# Patient Record
Sex: MALE | Race: WHITE | NOT HISPANIC OR LATINO | Employment: OTHER | URBAN - NONMETROPOLITAN AREA
[De-identification: names, ages, dates, MRNs, and addresses within clinical notes are randomized per-mention and may not be internally consistent; named-entity substitution may affect disease eponyms.]

---

## 2017-04-18 ENCOUNTER — TRANSCRIBE ORDERS (OUTPATIENT)
Dept: ADMINISTRATIVE | Facility: HOSPITAL | Age: 57
End: 2017-04-18

## 2017-04-18 DIAGNOSIS — R91.8 MULTIPLE LUNG NODULES: Primary | ICD-10-CM

## 2017-04-26 ENCOUNTER — HOSPITAL ENCOUNTER (OUTPATIENT)
Dept: CT IMAGING | Facility: HOSPITAL | Age: 57
Discharge: HOME | End: 2017-04-26
Attending: INTERNAL MEDICINE | Admitting: INTERNAL MEDICINE

## 2017-04-26 DIAGNOSIS — R91.8 MULTIPLE LUNG NODULES: ICD-10-CM

## 2017-04-26 PROCEDURE — 71250 CT THORAX DX C-: CPT

## 2017-11-16 ENCOUNTER — TRANSCRIBE ORDERS (OUTPATIENT)
Dept: ADMINISTRATIVE | Facility: HOSPITAL | Age: 57
End: 2017-11-16

## 2017-11-16 DIAGNOSIS — J44.9 CHRONIC OBSTRUCTIVE PULMONARY DISEASE, UNSPECIFIED COPD TYPE (HCC): Primary | ICD-10-CM

## 2017-11-17 ENCOUNTER — HOSPITAL ENCOUNTER (OUTPATIENT)
Dept: CT IMAGING | Facility: HOSPITAL | Age: 57
Discharge: HOME | End: 2017-11-17
Admitting: PHYSICIAN ASSISTANT

## 2017-11-17 DIAGNOSIS — J44.9 CHRONIC OBSTRUCTIVE PULMONARY DISEASE, UNSPECIFIED COPD TYPE (HCC): ICD-10-CM

## 2017-11-17 LAB — CREAT BLDA-MCNC: 0.9 MG/DL (ref 0.6–1.3)

## 2017-11-17 PROCEDURE — 71260 CT THORAX DX C+: CPT

## 2017-11-17 PROCEDURE — 0 IOPAMIDOL 61 % SOLUTION: Performed by: INTERNAL MEDICINE

## 2017-11-17 PROCEDURE — 82565 ASSAY OF CREATININE: CPT

## 2017-11-17 RX ADMIN — IOPAMIDOL 100 ML: 612 INJECTION, SOLUTION INTRAVENOUS at 08:45

## 2018-06-27 ENCOUNTER — TRANSCRIBE ORDERS (OUTPATIENT)
Dept: ADMINISTRATIVE | Facility: HOSPITAL | Age: 58
End: 2018-06-27

## 2018-06-27 DIAGNOSIS — I48.91 ATRIAL FIBRILLATION, UNSPECIFIED TYPE (HCC): Primary | ICD-10-CM

## 2018-06-27 DIAGNOSIS — R09.89 OTHER SPECIFIED SYMPTOMS AND SIGNS INVOLVING THE CIRCULATORY AND RESPIRATORY SYSTEMS: ICD-10-CM

## 2018-07-03 ENCOUNTER — HOSPITAL ENCOUNTER (OUTPATIENT)
Dept: CT IMAGING | Facility: HOSPITAL | Age: 58
Discharge: HOME | End: 2018-07-03
Admitting: PHYSICIAN ASSISTANT

## 2018-07-03 ENCOUNTER — HOSPITAL ENCOUNTER (OUTPATIENT)
Dept: CARDIOLOGY | Facility: HOSPITAL | Age: 58
Discharge: HOME | End: 2018-07-03

## 2018-07-03 DIAGNOSIS — I48.91 ATRIAL FIBRILLATION, UNSPECIFIED TYPE (HCC): ICD-10-CM

## 2018-07-03 DIAGNOSIS — R09.89 OTHER SPECIFIED SYMPTOMS AND SIGNS INVOLVING THE CIRCULATORY AND RESPIRATORY SYSTEMS: ICD-10-CM

## 2018-07-03 LAB — CREAT BLDA-MCNC: 0.9 MG/DL (ref 0.6–1.3)

## 2018-07-03 PROCEDURE — 93226 XTRNL ECG REC<48 HR SCAN A/R: CPT

## 2018-07-03 PROCEDURE — 93225 XTRNL ECG REC<48 HRS REC: CPT

## 2018-07-03 PROCEDURE — 25010000002 IOPAMIDOL 61 % SOLUTION: Performed by: PHYSICIAN ASSISTANT

## 2018-07-03 PROCEDURE — 71260 CT THORAX DX C+: CPT

## 2018-07-03 PROCEDURE — 82565 ASSAY OF CREATININE: CPT

## 2018-07-03 RX ADMIN — IOPAMIDOL 100 ML: 612 INJECTION, SOLUTION INTRAVENOUS at 13:00

## 2018-07-10 PROCEDURE — 93227 XTRNL ECG REC<48 HR R&I: CPT | Performed by: INTERNAL MEDICINE

## 2018-08-23 ENCOUNTER — TRANSCRIBE ORDERS (OUTPATIENT)
Dept: ADMINISTRATIVE | Facility: HOSPITAL | Age: 58
End: 2018-08-23

## 2018-08-23 DIAGNOSIS — R91.8 MULTIPLE PULMONARY NODULES: Primary | ICD-10-CM

## 2018-12-14 RX ORDER — ALBUTEROL SULFATE 2.5 MG/3ML
SOLUTION RESPIRATORY (INHALATION)
Qty: 360 ML | Refills: 3 | Status: SHIPPED | OUTPATIENT
Start: 2018-12-14 | End: 2019-07-12 | Stop reason: SDUPTHER

## 2019-02-18 ENCOUNTER — TRANSCRIBE ORDERS (OUTPATIENT)
Dept: ADMINISTRATIVE | Facility: HOSPITAL | Age: 59
End: 2019-02-18

## 2019-02-18 DIAGNOSIS — J44.9 CHRONIC OBSTRUCTIVE PULMONARY DISEASE, UNSPECIFIED COPD TYPE (HCC): ICD-10-CM

## 2019-02-18 DIAGNOSIS — R91.1 SOLITARY PULMONARY NODULE: ICD-10-CM

## 2019-02-18 DIAGNOSIS — J43.9 PULMONARY EMPHYSEMA, UNSPECIFIED EMPHYSEMA TYPE (HCC): Primary | ICD-10-CM

## 2019-02-21 ENCOUNTER — HOSPITAL ENCOUNTER (OUTPATIENT)
Dept: PULMONOLOGY | Facility: HOSPITAL | Age: 59
Discharge: HOME | End: 2019-02-21
Admitting: PHYSICIAN ASSISTANT

## 2019-02-21 DIAGNOSIS — J44.9 CHRONIC OBSTRUCTIVE PULMONARY DISEASE, UNSPECIFIED COPD TYPE (HCC): ICD-10-CM

## 2019-02-21 DIAGNOSIS — R91.1 SOLITARY PULMONARY NODULE: ICD-10-CM

## 2019-02-21 DIAGNOSIS — J43.9 PULMONARY EMPHYSEMA, UNSPECIFIED EMPHYSEMA TYPE (HCC): ICD-10-CM

## 2019-02-21 LAB
ARTERIAL PATENCY WRIST A: POSITIVE
ATMOSPHERIC PRESS: 756 MMHG
BASE EXCESS BLDA CALC-SCNC: 1.8 MMOL/L (ref 0–2)
BDY SITE: NORMAL
BODY TEMPERATURE: 37 C
HCO3 BLDA-SCNC: 25.7 MMOL/L (ref 20–26)
Lab: NORMAL
MODALITY: NORMAL
PCO2 BLDA: 37.2 MM HG (ref 35–45)
PH BLDA: 7.45 PH UNITS (ref 7.35–7.45)
PO2 BLDA: 84.4 MM HG (ref 83–108)
SAO2 % BLDCOA: 97.4 % (ref 94–99)
VENTILATOR MODE: NORMAL

## 2019-02-21 PROCEDURE — 36600 WITHDRAWAL OF ARTERIAL BLOOD: CPT

## 2019-02-21 PROCEDURE — 94060 EVALUATION OF WHEEZING: CPT

## 2019-02-21 PROCEDURE — 94726 PLETHYSMOGRAPHY LUNG VOLUMES: CPT | Performed by: INTERNAL MEDICINE

## 2019-02-21 PROCEDURE — 94726 PLETHYSMOGRAPHY LUNG VOLUMES: CPT

## 2019-02-21 PROCEDURE — 94729 DIFFUSING CAPACITY: CPT | Performed by: INTERNAL MEDICINE

## 2019-02-21 PROCEDURE — 82803 BLOOD GASES ANY COMBINATION: CPT

## 2019-02-21 PROCEDURE — 94729 DIFFUSING CAPACITY: CPT

## 2019-02-21 PROCEDURE — 94060 EVALUATION OF WHEEZING: CPT | Performed by: INTERNAL MEDICINE

## 2019-02-21 RX ORDER — ALBUTEROL SULFATE 2.5 MG/3ML
2.5 SOLUTION RESPIRATORY (INHALATION) ONCE
Status: DISCONTINUED | OUTPATIENT
Start: 2019-02-21 | End: 2019-02-22 | Stop reason: HOSPADM

## 2019-05-10 RX ORDER — ESCITALOPRAM OXALATE 10 MG/1
10 TABLET ORAL DAILY
COMMUNITY
End: 2020-02-18 | Stop reason: ALTCHOICE

## 2019-05-10 RX ORDER — TAMSULOSIN HYDROCHLORIDE 0.4 MG/1
1 CAPSULE ORAL NIGHTLY
COMMUNITY
End: 2021-06-28 | Stop reason: ALTCHOICE

## 2019-05-10 RX ORDER — BUDESONIDE AND FORMOTEROL FUMARATE DIHYDRATE 160; 4.5 UG/1; UG/1
2 AEROSOL RESPIRATORY (INHALATION)
COMMUNITY
End: 2020-02-18 | Stop reason: SDUPTHER

## 2019-05-10 RX ORDER — VARENICLINE TARTRATE 1 MG/1
1 TABLET, FILM COATED ORAL 2 TIMES DAILY
COMMUNITY
End: 2020-02-18 | Stop reason: ALTCHOICE

## 2019-05-10 RX ORDER — SILDENAFIL 100 MG/1
100 TABLET, FILM COATED ORAL DAILY PRN
COMMUNITY

## 2019-05-13 ENCOUNTER — OFFICE VISIT (OUTPATIENT)
Dept: PULMONOLOGY | Facility: CLINIC | Age: 59
End: 2019-05-13

## 2019-05-13 VITALS
HEIGHT: 70 IN | OXYGEN SATURATION: 95 % | BODY MASS INDEX: 25.77 KG/M2 | HEART RATE: 86 BPM | SYSTOLIC BLOOD PRESSURE: 122 MMHG | DIASTOLIC BLOOD PRESSURE: 80 MMHG | WEIGHT: 180 LBS

## 2019-05-13 DIAGNOSIS — J44.9 COPD, SEVERE (HCC): ICD-10-CM

## 2019-05-13 DIAGNOSIS — F17.210 CIGARETTE NICOTINE DEPENDENCE WITHOUT COMPLICATION: ICD-10-CM

## 2019-05-13 DIAGNOSIS — J98.4 SCARRING OF LUNG: ICD-10-CM

## 2019-05-13 DIAGNOSIS — R06.02 SHORTNESS OF BREATH: Primary | ICD-10-CM

## 2019-05-13 DIAGNOSIS — G47.34 NOCTURNAL HYPOXEMIA: ICD-10-CM

## 2019-05-13 LAB — SAO2 % BLD: NORMAL %

## 2019-05-13 PROCEDURE — 99214 OFFICE O/P EST MOD 30 MIN: CPT | Performed by: INTERNAL MEDICINE

## 2019-05-13 RX ORDER — ALBUTEROL SULFATE 90 UG/1
2 AEROSOL, METERED RESPIRATORY (INHALATION) EVERY 4 HOURS PRN
Qty: 1 INHALER | Refills: 11 | Status: SHIPPED | OUTPATIENT
Start: 2019-05-13 | End: 2021-06-28 | Stop reason: SDUPTHER

## 2019-05-13 NOTE — PATIENT INSTRUCTIONS
The patient states he is still smoking an occasional cigarette.  I did advise him to work on total smoking cessation.  His most recent [place] functions did show that his COPD was Gold stage III postbronchodilator.  I am going to get an overnight pulse ox study on room air to see if he still qualifies for nocturnal oxygen therapy.  We will see him back in about 3 months with a screening chest CT just prior to return at [facility].

## 2019-05-13 NOTE — PROGRESS NOTES
Subjective   Bin Oh is a 58 y.o. male.     Chief Complaint   Patient presents with   • Shortness of Breath      No My Sticky Note on file.    History of Present Illness   The patient returns today for follow-up.  He states he is still smoking occasional cigarette but not on a regular basis.  I counseled him as the importance of trying to totally discontinue smoking.  He did have recent pulmonary functions done at [facility] and although he had a very severe obstructive ventilatory defect prebronchodilator he had some improvement postbronchodilator and had a severe obstructive ventilatory defect present.  I encouraged him as the importance of totally quitting smoking.    Medical/Family/Social History   has a past medical history of Arthritis and COPD (chronic obstructive pulmonary disease) (CMS/[facility]).   has no past surgical history on file.  family history includes Heart disease in his father and mother.  He unfortunately still smoking occasionally.  He does not use smokeless tobacco and does not use alcohol or take drugs.  Allergies   Allergen Reactions   • Penicillins Other (See Comments)     Unknown       Medications    Current Outpatient Medications:   •  albuterol (PROVENTIL) (2.5 MG/3ML) 0.083% nebulizer solution, INHALE 1 VIAL EVERY 6 HOURS AS NEEDED, Disp: 360 mL, Rfl: 3  •  budesonide-formoterol (SYMBICORT) 160-4.5 MCG/ACT inhaler, Inhale 2 puffs 2 (Two) Times a Day., Disp: , Rfl:   •  dilTIAZem HCl Coated Beads (CARDIZEM CD PO), Take  by mouth., Disp: , Rfl:   •  escitalopram (LEXAPRO) 10 MG tablet, Take 10 mg by mouth Daily., Disp: , Rfl:   •  rivaroxaban (XARELTO) 20 MG tablet, Take 20 mg by mouth Daily., Disp: , Rfl:   •  sildenafil (VIAGRA) 100 MG tablet, Take 100 mg by mouth Daily As Needed for erectile dysfunction., Disp: , Rfl:   •  tamsulosin (FLOMAX) 0.4 MG capsule 24 hr capsule, Take 1 capsule by mouth Every Night., Disp: , Rfl:   •  tiotropium (SPIRIVA) 18 MCG per inhalation  "capsule, Place 1 capsule into inhaler and inhale Daily., Disp: , Rfl:   •  varenicline (CHANTIX) 1 MG tablet, Take 1 mg by mouth 2 (Two) Times a Day., Disp: , Rfl:   •  albuterol sulfate HFA (PROAIR HFA) 108 (90 Base) MCG/ACT inhaler, Inhale 2 puffs Every 4 (Four) Hours As Needed for Wheezing or Shortness of Air., Disp: 1 inhaler, Rfl: 11    Review of Systems   Constitutional: Negative for chills and fever.   HENT: Negative for congestion.    Eyes: Negative for visual disturbance.   Respiratory: Positive for shortness of breath. Negative for cough.    Cardiovascular: Negative for chest pain.   Gastrointestinal: Negative for diarrhea, nausea and vomiting.   Genitourinary: Negative for difficulty urinating.   Musculoskeletal: Negative for arthralgias.   Skin: Negative for rash.   Neurological: Negative for dizziness and speech difficulty.   Hematological: Negative for adenopathy.   Psychiatric/Behavioral: The patient is not nervous/anxious.      ------------------------------------  Objective   /80   Pulse 86   Ht 176.5 cm (69.5\")   Wt 81.6 kg (180 lb)   SpO2 95% Comment: RA  BMI 26.20 kg/m²   Physical Exam   Constitutional: He is oriented to person, place, and time. He appears well-developed and well-nourished.   HENT:   Head: Normocephalic and atraumatic.   Eyes: EOM are normal. Pupils are equal, round, and reactive to light.   Neck: Normal range of motion. Neck supple.   Cardiovascular: Normal rate, regular rhythm and normal heart sounds.   Pulmonary/Chest: Effort normal.   Breath sounds are diminished.   Abdominal: Soft.   Musculoskeletal: Normal range of motion.   Lymphadenopathy:   No adenopathy is palpated.   Neurological: He is alert and oriented to person, place, and time.   Skin: Skin is warm and dry.   Psychiatric: He has a normal mood and affect.   Nursing note and vitals reviewed.          Pulmonary Functions Testing Results:      Assessment/Plan   Bin was seen today for shortness of " breath.    Diagnoses and all orders for this visit:    Shortness of breath    Nocturnal hypoxemia  -     Walking Oximetry; Future  -     Overnight Sleep Oximetry Study; Future  -     Walking Oximetry    COPD, severe (CMS/HCC)  -     albuterol sulfate HFA (PROAIR HFA) 108 (90 Base) MCG/ACT inhaler; Inhale 2 puffs Every 4 (Four) Hours As Needed for Wheezing or Shortness of Air.    Scarring of lung    Cigarette nicotine dependence without complication  -     CT Chest Low Dose Wo; Future      Patient's Body mass index is 26.2 kg/m². BMI is within normal parameters. No follow-up required..      His last CT scan was stable just showing some small nodules which has been unchanged over greater than 2 years along with some scarring due to old granulomatous disease.  However based on his smoking history is a candidate for lung cancer screening so we will schedule this for about 3 months and see him back shortly thereafter.  I did  him regarding total smoking cessation and he will continue his current regimen from the standpoint of his COPD and I did refill his ProAir HFA inhaler prescription.  His walking O2 sat today dropped to 90%.  It was 95% at rest so he does not need oxygen during the day.  He does use nocturnal oxygen therapy and we will get an overnight pulse ox study on room air per his [employer] company to see if he still qualifies.  We will call him with these results when available.

## 2019-07-12 RX ORDER — ALBUTEROL SULFATE 2.5 MG/3ML
SOLUTION RESPIRATORY (INHALATION)
Qty: 120 VIAL | Refills: 11 | Status: SHIPPED | OUTPATIENT
Start: 2019-07-12 | End: 2020-07-30

## 2019-07-27 ENCOUNTER — HOSPITAL ENCOUNTER (EMERGENCY)
Facility: HOSPITAL | Age: 59
Discharge: HOME | End: 2019-07-27
Admitting: EMERGENCY MEDICINE

## 2019-07-27 ENCOUNTER — NURSE TRIAGE (OUTPATIENT)
Dept: CALL CENTER | Facility: HOSPITAL | Age: 59
End: 2019-07-27

## 2019-07-27 VITALS
BODY MASS INDEX: 25.77 KG/M2 | OXYGEN SATURATION: 100 % | DIASTOLIC BLOOD PRESSURE: 80 MMHG | HEIGHT: 69 IN | SYSTOLIC BLOOD PRESSURE: 112 MMHG | WEIGHT: 174 LBS | HEART RATE: 90 BPM | TEMPERATURE: 97.9 F | RESPIRATION RATE: 16 BRPM

## 2019-07-27 DIAGNOSIS — S00.512A ABRASION OF TONGUE, INITIAL ENCOUNTER: Primary | ICD-10-CM

## 2019-07-27 LAB
ABO GROUP BLD: NORMAL
ALBUMIN SERPL-MCNC: 4.1 G/DL (ref 3.5–5)
ALBUMIN/GLOB SERPL: 1.4 G/DL (ref 1.1–2.5)
ALP SERPL-CCNC: 82 U/L (ref 24–120)
ALT SERPL W P-5'-P-CCNC: 20 U/L (ref 0–54)
ANION GAP SERPL CALCULATED.3IONS-SCNC: 7 MMOL/L (ref 4–13)
APTT PPP: 38.2 SECONDS (ref 24.1–35)
AST SERPL-CCNC: 24 U/L (ref 7–45)
BASOPHILS # BLD AUTO: 0.04 10*3/MM3 (ref 0–0.2)
BASOPHILS NFR BLD AUTO: 0.7 % (ref 0–2)
BILIRUB SERPL-MCNC: 0.7 MG/DL (ref 0.1–1)
BLD GP AB SCN SERPL QL: NEGATIVE
BUN BLD-MCNC: 22 MG/DL (ref 5–21)
BUN/CREAT SERPL: 33.8 (ref 7–25)
CALCIUM SPEC-SCNC: 9.4 MG/DL (ref 8.4–10.4)
CHLORIDE SERPL-SCNC: 106 MMOL/L (ref 98–110)
CO2 SERPL-SCNC: 29 MMOL/L (ref 24–31)
CREAT BLD-MCNC: 0.65 MG/DL (ref 0.5–1.4)
DEPRECATED RDW RBC AUTO: 45.4 FL (ref 40–54)
EOSINOPHIL # BLD AUTO: 0.04 10*3/MM3 (ref 0–0.7)
EOSINOPHIL NFR BLD AUTO: 0.7 % (ref 0–4)
ERYTHROCYTE [DISTWIDTH] IN BLOOD BY AUTOMATED COUNT: 13.9 % (ref 12–15)
GFR SERPL CREATININE-BSD FRML MDRD: 125 ML/MIN/1.73
GLOBULIN UR ELPH-MCNC: 2.9 GM/DL
GLUCOSE BLD-MCNC: 79 MG/DL (ref 70–100)
HCT VFR BLD AUTO: 44.6 % (ref 40–52)
HGB BLD-MCNC: 14.8 G/DL (ref 14–18)
IMM GRANULOCYTES # BLD AUTO: 0 10*3/MM3 (ref 0–0.05)
IMM GRANULOCYTES NFR BLD AUTO: 0 % (ref 0–5)
INR PPP: 1.35 (ref 0.91–1.09)
LIPASE SERPL-CCNC: 74 U/L (ref 23–203)
LYMPHOCYTES # BLD AUTO: 1.38 10*3/MM3 (ref 0.72–4.86)
LYMPHOCYTES NFR BLD AUTO: 23.3 % (ref 15–45)
MCH RBC QN AUTO: 29.5 PG (ref 28–32)
MCHC RBC AUTO-ENTMCNC: 33.2 G/DL (ref 33–36)
MCV RBC AUTO: 89 FL (ref 82–95)
MONOCYTES # BLD AUTO: 0.5 10*3/MM3 (ref 0.19–1.3)
MONOCYTES NFR BLD AUTO: 8.4 % (ref 4–12)
NEUTROPHILS # BLD AUTO: 3.96 10*3/MM3 (ref 1.87–8.4)
NEUTROPHILS NFR BLD AUTO: 66.9 % (ref 39–78)
NRBC BLD AUTO-RTO: 0 /100 WBC (ref 0–0.2)
PLATELET # BLD AUTO: 224 10*3/MM3 (ref 130–400)
PMV BLD AUTO: 9.6 FL (ref 6–12)
POTASSIUM BLD-SCNC: 4.5 MMOL/L (ref 3.5–5.3)
PROT SERPL-MCNC: 7 G/DL (ref 6.3–8.7)
PROTHROMBIN TIME: 17.1 SECONDS (ref 11.9–14.6)
RBC # BLD AUTO: 5.01 10*6/MM3 (ref 4.8–5.9)
RH BLD: POSITIVE
SODIUM BLD-SCNC: 142 MMOL/L (ref 135–145)
T&S EXPIRATION DATE: NORMAL
WBC NRBC COR # BLD: 5.92 10*3/MM3 (ref 4.8–10.8)

## 2019-07-27 PROCEDURE — 83690 ASSAY OF LIPASE: CPT | Performed by: PHYSICIAN ASSISTANT

## 2019-07-27 PROCEDURE — 99283 EMERGENCY DEPT VISIT LOW MDM: CPT

## 2019-07-27 PROCEDURE — 86900 BLOOD TYPING SEROLOGIC ABO: CPT | Performed by: PHYSICIAN ASSISTANT

## 2019-07-27 PROCEDURE — 80053 COMPREHEN METABOLIC PANEL: CPT | Performed by: PHYSICIAN ASSISTANT

## 2019-07-27 PROCEDURE — 85610 PROTHROMBIN TIME: CPT | Performed by: PHYSICIAN ASSISTANT

## 2019-07-27 PROCEDURE — 85730 THROMBOPLASTIN TIME PARTIAL: CPT | Performed by: PHYSICIAN ASSISTANT

## 2019-07-27 PROCEDURE — 86901 BLOOD TYPING SEROLOGIC RH(D): CPT | Performed by: PHYSICIAN ASSISTANT

## 2019-07-27 PROCEDURE — 86850 RBC ANTIBODY SCREEN: CPT | Performed by: PHYSICIAN ASSISTANT

## 2019-07-27 PROCEDURE — 85025 COMPLETE CBC W/AUTO DIFF WBC: CPT | Performed by: PHYSICIAN ASSISTANT

## 2019-07-27 NOTE — TELEPHONE ENCOUNTER
"[employer]- They work with [facility]. .  Aisha. His partial became loose and cut the side of the tongue. She sees them every week for 2 years. He takes Xeralto. It has tried to clot the clot is stringy. He refused to go to the ED last night. They have been using ice. It looks like a scratch.  He has one black stool today. She would like the MD notified. Dr. Fontaine wants him to go to the ED.     Reason for Disposition  • [1] Caller requests to speak ONLY to PCP AND [2] URGENT question    Additional Information  • Negative: Lab calling with strep throat test results and triager can call in prescription  • Negative: Lab calling with urinalysis test results and triager can call in prescription  • Negative: Medication questions  • Negative: ED call to PCP  • Negative: Physician call to PCP  • Negative: Call about patient who is currently hospitalized  • Negative: Lab or radiology calling with CRITICAL test results  • Negative: [1] Prescription not at pharmacy AND [2] was prescribed today by PCP  • Negative: [1] Follow-up call from patient regarding patient's clinical status AND [2] information urgent    Answer Assessment - Initial Assessment Questions  1. REASON FOR CALL or QUESTION: \"What is your reason for calling today?\" or \"How can I best  help you?\" or \"What question do you have that I can help answer?\"      See Note   2. CALLER: Document the source of call. (e.g., laboratory, patient).      See note    Protocols used: PCP CALL - NO TRIAGE-ADULT-AH      "

## 2019-08-05 ENCOUNTER — HOSPITAL ENCOUNTER (OUTPATIENT)
Dept: CT IMAGING | Facility: HOSPITAL | Age: 59
Discharge: HOME | End: 2019-08-05
Admitting: INTERNAL MEDICINE

## 2019-08-05 DIAGNOSIS — F17.210 CIGARETTE NICOTINE DEPENDENCE WITHOUT COMPLICATION: ICD-10-CM

## 2019-08-05 PROCEDURE — G0297 LDCT FOR LUNG CA SCREEN: HCPCS

## 2019-08-08 ENCOUNTER — OFFICE VISIT (OUTPATIENT)
Dept: PULMONOLOGY | Facility: CLINIC | Age: 59
End: 2019-08-08

## 2019-08-08 VITALS
SYSTOLIC BLOOD PRESSURE: 120 MMHG | OXYGEN SATURATION: 96 % | HEART RATE: 96 BPM | WEIGHT: 174 LBS | DIASTOLIC BLOOD PRESSURE: 70 MMHG | RESPIRATION RATE: 16 BRPM | HEIGHT: 69 IN | BODY MASS INDEX: 25.77 KG/M2

## 2019-08-08 DIAGNOSIS — F17.210 CIGARETTE NICOTINE DEPENDENCE WITHOUT COMPLICATION: ICD-10-CM

## 2019-08-08 DIAGNOSIS — R91.8 LUNG NODULES: Primary | ICD-10-CM

## 2019-08-08 DIAGNOSIS — J98.4 SCARRING OF LUNG: ICD-10-CM

## 2019-08-08 DIAGNOSIS — G47.34 NOCTURNAL HYPOXEMIA: ICD-10-CM

## 2019-08-08 DIAGNOSIS — J44.9 COPD, SEVERE (HCC): ICD-10-CM

## 2019-08-08 PROCEDURE — 99214 OFFICE O/P EST MOD 30 MIN: CPT | Performed by: INTERNAL MEDICINE

## 2019-08-08 NOTE — PATIENT INSTRUCTIONS
I did advise the patient that his follow-up chest CT scan showed no suspicious lesions symmetrically no new nodules.  He had a blood test.  Trevin Martin which apparently suggest the possibility of an occult cancer.  Apparently attempts were made to schedule a PET scan but this was not covered.  I told him that based on his chest CT, a PET scan would be covered from that standpoint.  I would recommend we continue yearly screening chest CTs however.  I will see him back in 6 months with complete pulmonary function studies on return and again we will continue yearly screening chest CTs by current guidelines.

## 2019-08-09 NOTE — PROGRESS NOTES
Subjective   Bin Oh is a 58 y.o. male.     Chief Complaint   Patient presents with   • Follow up on CT of the Chest      No My Sticky Note on file.    History of Present Illness   The patient returns today for follow-up.  His follow-up CT showed some stable small nodules.  He would be candidate for yearly CAT scans based on his smoking history.  He listed 20-pack-year history of smoking initially but on closer questioning he is almost certainly smoked for at least 30 pack years and that he smoked for 43 years and is at times smoked over half a pack and up to a pack per day.  Trevin Phelps at Dr. Fontaine's office had performed a blood test to look for occult cancer and apparently this was positive.  Attempts were made to schedule the patient for a PET scan but these were not approved.  I advised him that based on his chest CT alone there will be no indication for a PET scan.  The recommendation would be a repeat chest CT in 1 year.  I would just recommend he follow-up with Trevin Martin regarding appropriate screening studies for other potential malignant processes.    Medical/Family/Social History   has a past medical history of Arthritis, Atrial fibrillation (CMS/HCC), and COPD (chronic obstructive pulmonary disease) (CMS/[facility]).   has a past surgical history that includes Foot surgery.  family history includes Heart disease in his father and mother; No Known Problems in his brother, maternal aunt, maternal grandfather, maternal grandmother, maternal uncle, paternal aunt, paternal grandfather, paternal grandmother, paternal uncle, and sister.   reports that he has been smoking cigarettes.  He started smoking about 43 years ago. He has a 30.00 pack-year smoking history. He has never used smokeless tobacco. He reports that he does not drink alcohol or use drugs.  Allergies   Allergen Reactions   • Penicillins Other (See Comments)     Unknown       Medications    Current Outpatient Medications:   •  albuterol (PROVENTIL)  "(2.5 MG/3ML) 0.083% nebulizer solution, INHALE 1 VIAL EVERY 6 HOURS AS NEEDED, Disp: 120 vial, Rfl: 11  •  albuterol sulfate HFA (PROAIR HFA) 108 (90 Base) MCG/ACT inhaler, Inhale 2 puffs Every 4 (Four) Hours As Needed for Wheezing or Shortness of Air., Disp: 1 inhaler, Rfl: 11  •  budesonide-formoterol (SYMBICORT) 160-4.5 MCG/ACT inhaler, Inhale 2 puffs 2 (Two) Times a Day., Disp: , Rfl:   •  dilTIAZem HCl Coated Beads (CARDIZEM CD PO), Take  by mouth., Disp: , Rfl:   •  escitalopram (LEXAPRO) 10 MG tablet, Take 10 mg by mouth Daily., Disp: , Rfl:   •  rivaroxaban (XARELTO) 20 MG tablet, Take 20 mg by mouth Daily., Disp: , Rfl:   •  sildenafil (VIAGRA) 100 MG tablet, Take 100 mg by mouth Daily As Needed for erectile dysfunction., Disp: , Rfl:   •  tamsulosin (FLOMAX) 0.4 MG capsule 24 hr capsule, Take 1 capsule by mouth Every Night., Disp: , Rfl:   •  tiotropium (SPIRIVA) 18 MCG per inhalation capsule, Place 1 capsule into inhaler and inhale Daily., Disp: , Rfl:   •  varenicline (CHANTIX) 1 MG tablet, Take 1 mg by mouth 2 (Two) Times a Day., Disp: , Rfl:     Review of Systems  Constitutional: Negative for chills and fever.   HENT: Negative for congestion.    Eyes: Negative for visual disturbance.   Respiratory: Positive for shortness of breath. Negative for cough.    Cardiovascular: Negative for chest pain.   Gastrointestinal: Negative for diarrhea, nausea and vomiting.   Genitourinary: Negative for difficulty urinating.   Musculoskeletal: Negative for arthralgias.   Skin: Negative for rash.   Neurological: Negative for dizziness and speech difficulty.   Hematological: Negative for adenopathy.   Psychiatric/Behavioral: The patient is not nervous/anxious.    Objective   /70   Pulse 96   Resp 16   Ht 175.3 cm (69\")   Wt 78.9 kg (174 lb)   SpO2 96% Comment: RA  BMI 25.70 kg/m²   Physical Exam  Constitutional: He is oriented to person, place, and time. He appears well-developed and well-nourished.   KADEEM: "   Head: Normocephalic and atraumatic.   Eyes: EOM are normal. Pupils are equal, round, and reactive to light.   Neck: Normal range of motion. Neck supple.   Cardiovascular: Normal rate, regular rhythm and normal heart sounds.   Pulmonary/Chest: Effort normal.   Breath sounds are diminished.   Abdominal: Soft.   Musculoskeletal: Normal range of motion.   Lymphadenopathy:   Neurological: He is alert and oriented to person, place, and time.   Skin: Skin is warm and dry.   Psychiatric: He has a normal mood and affect.   Nursing note and vitals reviewed.    Pulmonary Functions Testing Results:  No results found for: FEV1, FVC, BPA0BPS, TLC, DLCO     Assessment/Plan   Bin was seen today for follow up on ct of the chest.    Diagnoses and all orders for this visit:    Lung nodules    Scarring of lung    COPD, severe (CMS/HCC)    Nocturnal hypoxemia    Cigarette nicotine dependence without complication      Patient's Body mass index is 25.7 kg/m². BMI is within normal parameters. No follow-up required..      We will see the patient back in about 6 months with complete pulmonary function studies.  He is counseled regarding smoking cessation.  I did again advise him to follow-up with Trevin Martin regarding other screening studies for other potential malignancies.  Again he would not be a candidate for PET scan just based on his chest CT.

## 2020-02-18 ENCOUNTER — OFFICE VISIT (OUTPATIENT)
Dept: PULMONOLOGY | Facility: CLINIC | Age: 60
End: 2020-02-18

## 2020-02-18 VITALS
BODY MASS INDEX: 25.05 KG/M2 | SYSTOLIC BLOOD PRESSURE: 118 MMHG | DIASTOLIC BLOOD PRESSURE: 62 MMHG | OXYGEN SATURATION: 96 % | WEIGHT: 175 LBS | HEART RATE: 99 BPM | HEIGHT: 70 IN

## 2020-02-18 DIAGNOSIS — F17.210 CIGARETTE NICOTINE DEPENDENCE WITHOUT COMPLICATION: ICD-10-CM

## 2020-02-18 DIAGNOSIS — J44.9 COPD, SEVERE (HCC): Primary | ICD-10-CM

## 2020-02-18 DIAGNOSIS — J44.9 COPD, SEVERE (HCC): ICD-10-CM

## 2020-02-18 DIAGNOSIS — R91.8 LUNG NODULES: Primary | ICD-10-CM

## 2020-02-18 PROCEDURE — 99214 OFFICE O/P EST MOD 30 MIN: CPT | Performed by: INTERNAL MEDICINE

## 2020-02-18 PROCEDURE — 94010 BREATHING CAPACITY TEST: CPT | Performed by: INTERNAL MEDICINE

## 2020-02-18 PROCEDURE — 94727 GAS DIL/WSHOT DETER LNG VOL: CPT | Performed by: INTERNAL MEDICINE

## 2020-02-18 PROCEDURE — 94729 DIFFUSING CAPACITY: CPT | Performed by: INTERNAL MEDICINE

## 2020-02-18 RX ORDER — BUDESONIDE AND FORMOTEROL FUMARATE DIHYDRATE 160; 4.5 UG/1; UG/1
2 AEROSOL RESPIRATORY (INHALATION)
Qty: 2 INHALER | Refills: 0 | Status: SHIPPED | OUTPATIENT
Start: 2020-02-18

## 2020-02-18 RX ORDER — DIAZEPAM 10 MG/1
TABLET ORAL AS NEEDED
COMMUNITY
Start: 2019-12-18

## 2020-02-18 NOTE — PROGRESS NOTES
Subjective   Bin Oh is a 59 y.o. male.     Chief Complaint   Patient presents with   • Lung Nodule      No My Sticky Note on file.    History of Present Illness   The patient has been cutting back on his cigarette consumption states he is doing well from a pulmonary standpoint.  His pulmonary function show stability of his spirometry actually compared to previous postbronchodilator spirometry with less evidence of hyperinflation.  Diffusion capacity has dropped somewhat.  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  I did tell him that in general if patients have stable nodules by Fleischner criteria over a period of time that we would then go back to screening chest CTs for smokers and no follow-up CTs for non-smokers once they have been followed through Fleischner guidelines.  However he states the department of labor apparently will pay for the noncontrast and may not cover a low-dose CT so I will order his neck CT in that regard.  That would be in about 6 months.    Medical/Family/Social History   has a past medical history of Arthritis, Atrial fibrillation (CMS/[facility]), and COPD (chronic obstructive pulmonary disease) (CMS/[facility]).   has a past surgical history that includes Foot surgery.  family history includes Heart disease in his father and mother; No Known Problems in his brother, maternal aunt, maternal grandfather, maternal grandmother, maternal uncle, paternal aunt, paternal grandfather, paternal grandmother, paternal uncle, and sister.   reports that he has been smoking cigarettes. He started smoking about 44 years ago. He has a 30.00 pack-year smoking history. He has never used smokeless tobacco. He reports that he does not drink alcohol or use drugs.  Allergies   Allergen Reactions   • Penicillins Other (See Comments)     Unknown       Medications    Current Outpatient Medications:   •  albuterol  "(PROVENTIL) (2.5 MG/3ML) 0.083% nebulizer solution, INHALE 1 VIAL EVERY 6 HOURS AS NEEDED, Disp: 120 vial, Rfl: 11  •  albuterol sulfate HFA (PROAIR HFA) 108 (90 Base) MCG/ACT inhaler, Inhale 2 puffs Every 4 (Four) Hours As Needed for Wheezing or Shortness of Air., Disp: 1 inhaler, Rfl: 11  •  budesonide-formoterol (SYMBICORT) 160-4.5 MCG/ACT inhaler, Inhale 2 puffs 2 (Two) Times a Day. Rinse and spit after using., Disp: 2 inhaler, Rfl: 0  •  diazePAM (VALIUM) 10 MG tablet, As Needed., Disp: , Rfl:   •  dilTIAZem HCl Coated Beads (CARDIZEM CD PO), Take  by mouth., Disp: , Rfl:   •  rivaroxaban (XARELTO) 20 MG tablet, Take 20 mg by mouth Daily., Disp: , Rfl:   •  sildenafil (VIAGRA) 100 MG tablet, Take 100 mg by mouth Daily As Needed for erectile dysfunction., Disp: , Rfl:   •  tamsulosin (FLOMAX) 0.4 MG capsule 24 hr capsule, Take 1 capsule by mouth Every Night., Disp: , Rfl:   •  tiotropium (SPIRIVA) 18 MCG per inhalation capsule, Place 1 capsule into inhaler and inhale Daily., Disp: , Rfl:     Review of Systems   Constitutional: Negative for chills and fever.   HENT: Negative for congestion.    Eyes: Negative for visual disturbance.   Respiratory: Positive for shortness of breath.    Cardiovascular: Negative for chest pain.   Gastrointestinal: Negative for diarrhea, nausea and vomiting.   Genitourinary: Negative for difficulty urinating.   Musculoskeletal: Negative for arthralgias.   Skin: Negative for rash.   Neurological: Negative for dizziness and speech difficulty.   Psychiatric/Behavioral: The patient is not nervous/anxious.      ------------------------------------  Objective   /62   Pulse 99   Ht 177.8 cm (70\")   Wt 79.4 kg (175 lb)   SpO2 96% Comment: RA  BMI 25.11 kg/m²   Physical Exam   Constitutional: He is oriented to person, place, and time. He appears well-developed and well-nourished.   HENT:   Head: Normocephalic and atraumatic.   Eyes: Pupils are equal, round, and reactive to light. EOM " are normal.   Neck: Normal range of motion. Neck supple.   Cardiovascular: Normal rate, regular rhythm and normal heart sounds.   Pulmonary/Chest: Effort normal.   Breath sounds are diminished.   Abdominal: Soft.   Musculoskeletal: Normal range of motion.   Neurological: He is alert and oriented to person, place, and time.   Skin: Skin is warm and dry.   Psychiatric: He has a normal mood and affect.   Nursing note and vitals reviewed.              Pulmonary Functions Testing Results:  PFT Values        Some values may be hidden. Unless noted otherwise, only the newest values recorded on each date are displayed.         Old Values PFT Results 2/18/20   No data to display.      Pre Drug PFT Results 2/18/20   FVC 62   FEV1 35   FEF 25-75% 10   FEV1/FVC 44.85      Post Drug PFT Results 2/18/20   No data to display.      Other Tests PFT Results 2/18/20   TLC 85      DLCO 36   D/VAsb 55               My interpretation of PFT:  1.  Spirometry is consistent with a severe obstructive ventilatory defect.  2.  Lung volumes really decrease in vital capacity second to obstruction.  Hyperinflation is present.  There is also a decrease in inspiratory capacity.  3.  There is a severe diffusion impairment which when corrected for alveolar volume is a moderate diffusion impairment.  4.  When current studies are compared to studies from February of last year, the patient's current baseline spirometry shows improvement compared to previous prebronchodilator spirometry and actually appears fairly similar to previous postbronchodilator spirometry.  The degree of hyperinflation is less on his current studies.  When corrected for alveolar volume there has been a drop in diffusion capacity.  Assessment/Plan   Bin was seen today for lung nodule.    Diagnoses and all orders for this visit:    Lung nodules  -     Pulmonary Function Test  -     CT Chest Without Contrast; Future    COPD, severe (CMS/[facility])  -     budesonide-formoterol  (SYMBICORT) 160-4.5 MCG/ACT inhaler; Inhale 2 puffs 2 (Two) Times a Day. Rinse and spit after using.    Cigarette nicotine dependence without complication      Patient's Body mass index is 25.11 kg/m². BMI is within normal parameters. No follow-up required..      Again, apparently the department of labor prefers follow-up CAT scans done as a standard noncontrast CT rather than low-dose and will order his follow-up scan in this regard.  He is counseled regarding smoking cessation.  Samples of Symbicort 160/4.5 inhaler were provided and I will see him back in 6 months with a noncontrast chest CT just prior to return.

## 2020-02-18 NOTE — PROCEDURES
Pulmonary Function Test  Performed by: Korey Vyas MD  Authorized by: Korey Vyas MD      Pre Drug    FVC: 62%   FEV1: 35%   FEF 25-75%: 10%   FEV1/FVC: 44.85%   T%   RV: 134%   DLCO: 36%   D/VAsb: 55%

## 2020-07-30 DIAGNOSIS — J44.9 COPD, SEVERE (HCC): Primary | ICD-10-CM

## 2020-07-30 RX ORDER — ALBUTEROL SULFATE 2.5 MG/3ML
SOLUTION RESPIRATORY (INHALATION)
Qty: 360 ML | Refills: 11 | Status: SHIPPED | OUTPATIENT
Start: 2020-07-30 | End: 2023-03-17 | Stop reason: SDUPTHER

## 2020-07-30 NOTE — TELEPHONE ENCOUNTER
Pharmacy sent a request for refills on Albuterol solution. Refills sent to [facility].

## 2020-08-19 ENCOUNTER — HOSPITAL ENCOUNTER (OUTPATIENT)
Dept: CT IMAGING | Facility: HOSPITAL | Age: 60
Discharge: HOME | End: 2020-08-19
Admitting: INTERNAL MEDICINE

## 2020-08-19 DIAGNOSIS — R91.8 LUNG NODULES: ICD-10-CM

## 2020-08-19 PROCEDURE — 71250 CT THORAX DX C-: CPT

## 2020-08-25 ENCOUNTER — OFFICE VISIT (OUTPATIENT)
Dept: PULMONOLOGY | Facility: CLINIC | Age: 60
End: 2020-08-25

## 2020-08-25 VITALS
HEART RATE: 80 BPM | RESPIRATION RATE: 16 BRPM | DIASTOLIC BLOOD PRESSURE: 72 MMHG | TEMPERATURE: 97 F | OXYGEN SATURATION: 97 % | SYSTOLIC BLOOD PRESSURE: 122 MMHG | BODY MASS INDEX: 25.05 KG/M2 | WEIGHT: 175 LBS | HEIGHT: 70 IN

## 2020-08-25 DIAGNOSIS — F17.210 CIGARETTE NICOTINE DEPENDENCE WITHOUT COMPLICATION: ICD-10-CM

## 2020-08-25 DIAGNOSIS — R91.8 LUNG NODULES: ICD-10-CM

## 2020-08-25 DIAGNOSIS — Z99.81 HYPOXEMIA REQUIRING SUPPLEMENTAL OXYGEN: ICD-10-CM

## 2020-08-25 DIAGNOSIS — G47.34 NOCTURNAL HYPOXEMIA: ICD-10-CM

## 2020-08-25 DIAGNOSIS — J98.4 SCARRING OF LUNG: ICD-10-CM

## 2020-08-25 DIAGNOSIS — R06.02 SHORTNESS OF BREATH: ICD-10-CM

## 2020-08-25 DIAGNOSIS — J44.9 COPD, SEVERE (HCC): Primary | ICD-10-CM

## 2020-08-25 DIAGNOSIS — R09.02 HYPOXEMIA REQUIRING SUPPLEMENTAL OXYGEN: ICD-10-CM

## 2020-08-25 PROCEDURE — 99213 OFFICE O/P EST LOW 20 MIN: CPT | Performed by: INTERNAL MEDICINE

## 2020-08-25 NOTE — PROGRESS NOTES
"[facility] OUTPATIENT PROGRESS NOTE    Patient: Bin Oh  : 1960  Age: 59 y.o.  Date of Service: 2020    REASON FOR CLINIC VISIT:  Chief Complaint   Patient presents with   • \"Here for Ct results done at [facility].\"       Subjective:    History of Present Illness:  Bin Oh is a 59 y.o. male who presents to the office today to be seen for    Diagnosis Plan   1. COPD, severe (CMS/HCC)     2. Shortness of breath     3. Nocturnal hypoxemia     4. Scarring of lung     5. Lung nodules     6. Cigarette nicotine dependence without complication     7. Hypoxemia requiring supplemental oxygen     .  Other problems per record.    History:    Patient is a very pleasant 59 years old  gentleman who attends the pulmonary clinic for a follow-up visit.  He was seen by Dr. Ramirez in the past.    He has known severe chronic obstructive pulmonary disease and he is still continues to smoke less than half pack a day.  He is retired .  He is   using Symbicort and Spiriva and also uses albuterol nebulizer and rescue inhaler as needed.  He has nocturnal hypoxemia and uses oxygen 2 L at night.  Reported he did not have any hospital admissions and ER visit or urgent care visit since his last visit with Dr. Ramirez earlier this year.  He did not have any exposure to any coronavirus patient or any sick contact or recent travel.    He gets shortness of breath on moderate exertion but is functional and lives with his wife.  He gets his flu vaccines from his primary care provider.  He already has an medication refills and he did not need any other medications.  He has some impacted earwax and is waiting to see his primary care provider for cleaning of the year.  He had history of lung nodules which are being followed up in the pulmonary clinic and his recent CT scan done showed no change in the pulmonary nodules.  He has no other new symptoms or any shortness of breath any " cough hemoptysis any fever night sweats or any weight change.      PFT done today:  Not done today    Last PFT in 2020 showed    Pulmonary Function Test  Performed by: Korey Vyas MD  Authorized by: Korey Vyas MD       Pre Drug    FVC: 62%   FEV1: 35%   FEF 25-75%: 10%   FEV1/FVC: 44.85%   T%   RV: 134%   DLCO: 36%   D/VAsb: 55%     Bronchodilator therapy: Symbicort, Spriva and Albuterol neb and rescue inhaer    Smoking Status:   Social History     Tobacco Use   Smoking Status Current Every Day Smoker   • Packs/day: 0.50   • Years: 40.00   • Pack years: 20.00   • Types: Cigarettes   • Start date:    Smokeless Tobacco Never Used   Tobacco Comment    3-4 a day     Pulm Rehab: no  Sleep: yes    Support System: lives with their spouse    Code Status:   There are no questions and answers to display.        Review of Systems:  A complete review of systems is performed and all other systems were reviewed and negative as note above in the HPI.  Review of Systems    CAT/ACT Score:  Not done today    Medications:  Outpatient Encounter Medications as of 2020   Medication Sig Dispense Refill   • albuterol (PROVENTIL) (2.5 MG/3ML) 0.083% nebulizer solution INHALE 1 VIAL PER NEBULIZER EVERY 6 HOURS AS NEEDED 360 mL 11   • albuterol sulfate HFA (PROAIR HFA) 108 (90 Base) MCG/ACT inhaler Inhale 2 puffs Every 4 (Four) Hours As Needed for Wheezing or Shortness of Air. 1 inhaler 11   • budesonide-formoterol (SYMBICORT) 160-4.5 MCG/ACT inhaler Inhale 2 puffs 2 (Two) Times a Day. Rinse and spit after using. 2 inhaler 0   • diazePAM (VALIUM) 10 MG tablet As Needed.     • dilTIAZem HCl Coated Beads (CARDIZEM CD PO) Take  by mouth.     • rivaroxaban (XARELTO) 20 MG tablet Take 20 mg by mouth Daily.     • tiotropium bromide monohydrate (SPIRIVA RESPIMAT) 2.5 MCG/ACT aerosol solution inhaler Inhale 2 puffs Daily.     • sildenafil (VIAGRA) 100 MG tablet Take 100 mg by mouth Daily As Needed for  "erectile dysfunction.     • tamsulosin (FLOMAX) 0.4 MG capsule 24 hr capsule Take 1 capsule by mouth Every Night.       No facility-administered encounter medications on file as of 8/25/2020.        Allergies:  Allergies   Allergen Reactions   • Penicillins Other (See Comments)     Unknown         Immunizations:  Immunization History   Administered Date(s) Administered   • Pneumococcal, Unspecified 01/01/2018       Objective:    Vitals:  /72   Pulse 80   Temp 97 °F (36.1 °C)   Resp 16   Ht 177.8 cm (70\")   Wt 79.4 kg (175 lb)   SpO2 97% Comment: RA  BMI 25.11 kg/m²     Physical Exam:  General: Patient is a 59 y.o. middle aged pleasant  male. Looks stated age. Appears to be in no acute distress.  Eyes: EOMI. PERRLA. Vision intact. No scleral icterus.  Ear, Nose, Mouth and Throat: Hearing is grossly intact. No Leukoplakia, pharyngitis, stomatitis or thrush. Swollen nasal mucosa with post nasal drop.  Neck: Range of motion of neck normal. No thyromegaly or masses. Mallampati Class 3, no JVD  Respiratory: Clear to auscultation bilaterally. No use of accessory muscles. Decreased breath sounds.  Cardiovascular: Normal heart sounds. Regularly regular rhythm without murmur.  Gastrointestinal: Non tender, non distended, soft. Bowel sounds positive in all four quadrants. No organomegaly.  Skin: No obvious rashes, lesions, ulcers or large amount of bruising. No edema.   Neurological: No new motor deficits. Cranial nerves appear intact.  Psychiatric: Patient is alert and oriented to person, place and time.    Chest Imaging:    CT chest done recently showed   Impression:     1. Stable CT of the chest. Subcentimeter nodules are present stable and  unchanged from August 5, 2019.          Assessment:  1. COPD, severe (CMS/HCC)    2. Shortness of breath    3. Nocturnal hypoxemia    4. Scarring of lung    5. Lung nodules    6. Cigarette nicotine dependence without complication    7. Hypoxemia requiring " supplemental oxygen        Plan/Recommendations:    1.  For his chronic obstructive pulmonary disease he was advised to continue Symbicort Spiriva and albuterol nebulizer inhaler as before.  2.  For his nocturnal hypoxemia he should continue using oxygen 2 L at night and he may use oxygen during the day at the time exercise and activity if needed.  3.  Smoking cessation counseling was done for 7 minutes and patient shows some motivation to quit smoking and a quit date has been set up in a month time.  4.  He has occasional allergy symptoms and uses over-the-counter allergy medications and is waiting for his earwax cleaning by primary care provider.  He will also get a influenza and pneumonia vaccine from primary care provider.  5.  Continue all other treatment as before return to the pulmonary clinic in 6 months time with a follow-up CT scan of the chest and PFT or earlier if needed.    Follow up:  6 Months    Time Spent:  35 minutes    I appreciate the opportunity of participating in this patient's care. I would like to thank the PCP for the referral.  Please feel free to contact me with any other questions.    Vini Leblanc MD   Pulmonologist/Intensivist     Electronically signed by: Vini Leblanc MD, 8/25/2020 09:29

## 2021-03-02 ENCOUNTER — HOSPITAL ENCOUNTER (OUTPATIENT)
Dept: CT IMAGING | Facility: HOSPITAL | Age: 61
Discharge: HOME | End: 2021-03-02
Admitting: INTERNAL MEDICINE

## 2021-03-02 DIAGNOSIS — R91.8 LUNG NODULES: ICD-10-CM

## 2021-03-02 PROCEDURE — 71250 CT THORAX DX C-: CPT

## 2021-03-04 ENCOUNTER — OFFICE VISIT (OUTPATIENT)
Dept: PULMONOLOGY | Facility: CLINIC | Age: 61
End: 2021-03-04

## 2021-03-04 VITALS
TEMPERATURE: 97.3 F | BODY MASS INDEX: 25.48 KG/M2 | WEIGHT: 178 LBS | RESPIRATION RATE: 16 BRPM | HEIGHT: 70 IN | HEART RATE: 76 BPM | SYSTOLIC BLOOD PRESSURE: 122 MMHG | OXYGEN SATURATION: 97 % | DIASTOLIC BLOOD PRESSURE: 68 MMHG

## 2021-03-04 DIAGNOSIS — R91.8 LUNG NODULES: ICD-10-CM

## 2021-03-04 DIAGNOSIS — J44.1 COPD WITH ACUTE EXACERBATION (HCC): Primary | ICD-10-CM

## 2021-03-04 DIAGNOSIS — F17.210 CIGARETTE NICOTINE DEPENDENCE WITHOUT COMPLICATION: ICD-10-CM

## 2021-03-04 DIAGNOSIS — G47.34 NOCTURNAL HYPOXEMIA: ICD-10-CM

## 2021-03-04 PROCEDURE — 99214 OFFICE O/P EST MOD 30 MIN: CPT | Performed by: INTERNAL MEDICINE

## 2021-03-04 RX ORDER — PREDNISONE 10 MG/1
TABLET ORAL
Qty: 31 TABLET | Refills: 0 | Status: SHIPPED | OUTPATIENT
Start: 2021-03-04 | End: 2022-03-17

## 2021-03-04 RX ORDER — LEVOFLOXACIN 750 MG/1
750 TABLET ORAL DAILY
Qty: 10 TABLET | Refills: 0 | Status: SHIPPED | OUTPATIENT
Start: 2021-03-04 | End: 2021-06-28

## 2021-03-04 NOTE — PATIENT INSTRUCTIONS
The patient's follow-up scan showed no new or suspicious nodules.  There were some changes in the anterior portion of the right upper lobe suggesting some inflammatory process.  He has had more problems with shortness of breath and some wheezing.  We will treat him with a 10-day course of Levaquin and a tapering course of prednisone and then see him back in a few weeks.  I told him we will plan on another CT possibly in 6 to 12 months.  He is only smoking a few cigarettes per week and is encouraged to continue to cut back.  I did choose Levaquin as the antibiotic for this patient as he has a history of a severe reaction to penicillin so we will not use Omnicef and he has had side effects with macrolides.

## 2021-03-04 NOTE — ASSESSMENT & PLAN NOTE
He is still smoking a few cigarettes per week.  He is encouraged to try to totally discontinue smoking.

## 2021-03-04 NOTE — ASSESSMENT & PLAN NOTE
His CT scan did not show any new discrete nodules.  It did show  what appeared to be some inflammatory changes in the right upper lobe anteriorly.

## 2021-03-04 NOTE — PROGRESS NOTES
Chief Complaint  Shortness of Breath and Cough    Subjective    History of Present Illness {CC  Problem List  Visit Diagnosis   Encounters  Notes  Medications  Labs  Result Review Imaging  Media     Bin Oh presents to [facility] for increasing problems with shortness of breath.    History of Present Illness   The patient has cut back on his cigarette consumption to just a few cigarettes per week.  He does steroids had problems with increasing dyspnea and some productive cough.  He did have a recent chest CT and there were no suspicious or new nodules but there was an area of possible inflammatory change in the anterior medial right upper lobe and I did review the scan.  I told him based on this we will treat him with some antibiotics and prednisone and then see him back in a few weeks.  He is on a good regimen for his COPD including albuterol neb treatments, albuterol HFA to use as needed, Symbicort, and Spiriva Respimat.  He states that time he has had to use his neb treatments up to every 2 hours.  I encouraged him to work on smoking cessation totally isolate he is not smoking whatsoever, and we will treat him with the aforementioned medications in the short-term, and then could consider something such as Daliresp to help hopefully prevent further exacerbations when he recovers from these current symptoms.  We can continue serial chest CTs even if his nodules remain stable for lung cancer screening purposes.  If the department of labor prefers these to be done a standard noncontrast CTs we can certainly do so but he would be a candidate for early screening CAT scans as long as he continues to smoke up until his late 70s.  Prior to Admission medications    Medication Sig Start Date End Date Taking? Authorizing Provider   albuterol (PROVENTIL) (2.5 MG/3ML) 0.083% nebulizer solution INHALE 1 VIAL PER NEBULIZER EVERY 6 HOURS AS NEEDED 7/30/20  Yes  Bibi Herrera APRN   albuterol sulfate HFA (PROAIR HFA) 108 (90 Base) MCG/ACT inhaler Inhale 2 puffs Every 4 (Four) Hours As Needed for Wheezing or Shortness of Air. 5/13/19  Yes Korey Vyas MD   budesonide-formoterol (SYMBICORT) 160-4.5 MCG/ACT inhaler Inhale 2 puffs 2 (Two) Times a Day. Rinse and spit after using. 2/18/20  Yes Korey Vyas MD   diazePAM (VALIUM) 10 MG tablet As Needed. 12/18/19  Yes ProviderSanaz MD   dilTIAZem HCl Coated Beads (CARDIZEM CD PO) Take  by mouth.   Yes ProviderSanaz MD   O2 (OXYGEN) Inhale 2 L/min 1 (One) Time.   Yes Sanaz Bunch MD   rivaroxaban (XARELTO) 20 MG tablet Take 20 mg by mouth Daily.   Yes Sanaz Bunch MD   sildenafil (VIAGRA) 100 MG tablet Take 100 mg by mouth Daily As Needed for erectile dysfunction.   Yes Sanaz Bunch MD   tamsulosin (FLOMAX) 0.4 MG capsule 24 hr capsule Take 1 capsule by mouth Every Night.   Yes ProviderSanaz MD   tiotropium bromide monohydrate (SPIRIVA RESPIMAT) 2.5 MCG/ACT aerosol solution inhaler Inhale 2 puffs Daily.   Yes ProviderSanaz MD   levoFLOXacin (Levaquin) 750 MG tablet Take 1 tablet by mouth Daily. 3/4/21   Korey Vyas MD   predniSONE (DELTASONE) 10 MG tablet Take 4 tabs daily x 3 days, then take 3 tabs daily x 3 days, then take 2 tabs daily x 3 days, then take 1 tab daily x 3 days 3/4/21   Korey Vyas MD       Social History     Socioeconomic History   • Marital status:      Spouse name: Not on file   • Number of children: Not on file   • Years of education: Not on file   • Highest education level: Not on file   Tobacco Use   • Smoking status: Current Every Day Smoker     Packs/day: 0.50     Years: 40.00     Pack years: 20.00     Types: Cigarettes     Start date: 1976   • Smokeless tobacco: Never Used   • Tobacco comment: 4 or 5 per week   Substance and Sexual Activity   • Alcohol use: No     Frequency: Never   • Drug  "use: No   • Sexual activity: Defer       Objective   Vital Signs:   /68   Pulse 76   Temp 97.3 °F (36.3 °C)   Resp 16   Ht 177.8 cm (70\")   Wt 80.7 kg (178 lb)   SpO2 97% Comment: RA  BMI 25.54 kg/m²     Physical Exam  Vitals signs and nursing note reviewed.   Constitutional:       Appearance: Normal appearance.   HENT:      Head: Normocephalic.      Comments: He is wearing a mask.  Eyes:      Extraocular Movements: Extraocular movements intact.      Pupils: Pupils are equal, round, and reactive to light.   Neck:      Musculoskeletal: Normal range of motion and neck supple.   Cardiovascular:      Comments: Heart sounds are distant.   Pulmonary:      Effort: Pulmonary effort is normal.      Comments: Breath sounds are somewhat diminished.  Abdominal:      General: Abdomen is flat.   Musculoskeletal: Normal range of motion.   Skin:     General: Skin is warm and dry.   Neurological:      General: No focal deficit present.      Mental Status: He is alert and oriented to person, place, and time.   Psychiatric:         Mood and Affect: Mood normal.        Result Review :{ Labs  Result Review  Imaging  Med Tab  Media :    PFT Values        Some values may be hidden. Unless noted otherwise, only the newest values recorded on each date are displayed.         Old Values PFT Results 20   No data to display.      Pre Drug PFT Results 20   FVC 62   FEV1 35   FEF 25-75% 10   FEV1/FVC 44.85      Post Drug PFT Results 20   No data to display.      Other Tests PFT Results 20   TLC 85      DLCO 36   D/VAsb 55               Results for orders placed in visit on 20   Pulmonary Function Test    Narrative Ana Lilia Dubon, RRT     2020 11:12 AM  Pulmonary Function Test  Performed by: Korey Vyas MD  Authorized by: Korey Vyas MD      Pre Drug    FVC: 62%   FEV1: 35%   FEF 25-75%: 10%   FEV1/FVC: 44.85%   T%   RV: 134%   DLCO: 36%   D/VAsb: 55% "   Results for orders placed during the hospital encounter of 02/21/19   Full Pulmonary Function Test With Bronchodilator & ABG    Narrative [facility] - Pulmonary Function Test    [facility]  [place]  [place]  [address]  [phone]    Patient : Bin Oh   MRN : [id number]  CSN : [id number]  Pulmonologist : Korey Vyas MD  Date : 2/21/2019    ______________________________________________________________________    Interpretation :  1.  Spirometry is consistent with a very severe obstructive ventilatory   defect.  2.  There is improvement in spirometry postbronchodilator but a severe   obstructive ventilatory defect is still present.  3.  Lung volumes reveal hyperinflation.  There is also a decrease in vital   capacity secondary to obstruction and a decrease in inspiratory capacity.  4.  There is a moderate diffusion impairment which when corrected for   alveolar volume is a mild diffusion impairment.  5.  Arterial blood gases are within normal limits on room air.      Korey Vyas MD                                My interpretation of imaging:    CT Chest Without Contrast Diagnostic (03/02/2021 10:06)        Assessment and Plan {CC Problem List  Visit Diagnosis  ROS  Review (Popup)  Health Maintenance  Quality  BestPractice  Medications  SmartSets  SnapShot Encounters  Media      Problem List Items Addressed This Visit        Pulmonary and Pneumonias    Lung nodules    Current Assessment & Plan     His CT scan did not show any new discrete nodules.  It did show  what appeared to be some inflammatory changes in the right upper lobe anteriorly.         COPD with acute exacerbation (CMS/HCC) - Primary    Current Assessment & Plan     We will treat him with prednisone and Levaquin for what appears to be an exacerbation of COPD.  I did advise him that Levaquin could be associated with some tendon issues including tendon rupture.  However he has a history of a severe allergic  reaction to penicillin so cephalosporins I think would not be an ideal choice and he has not done well with macrolides.  I would be concerned about not covering standard pulmonary pathogens well with a tetracycline and for that reason I have prescribed Levaquin.  He may continue his albuterol via both neb treatments and HFA inhaler as needed up to every 4 hours and he can do this even more frequently for acute symptomatology, and also continue his Symbicort and Spiriva Respimat.             Relevant Medications    levoFLOXacin (Levaquin) 750 MG tablet    predniSONE (DELTASONE) 10 MG tablet       Sleep    Nocturnal hypoxemia    Current Assessment & Plan     He will continue his nocturnal oxygen therapy.            Tobacco    Cigarette nicotine dependence without complication    Current Assessment & Plan     He is still smoking a few cigarettes per week.  He is encouraged to try to totally discontinue smoking.               Patient's Body mass index is 25.54 kg/m². BMI is within normal parameters. No follow-up required..        Korey Vyas MD  3/4/2021  16:27 CST    Follow Up {Instructions Charge Capture  Follow-up Communications   Return in about 3 weeks (around 3/25/2021) for To see me specifically.    Patient was given instructions and counseling regarding his condition or for health maintenance advice. Please see specific information pulled into the AVS if appropriate.

## 2021-03-04 NOTE — ASSESSMENT & PLAN NOTE
We will treat him with prednisone and Levaquin for what appears to be an exacerbation of COPD.  I did advise him that Levaquin could be associated with some tendon issues including tendon rupture.  However he has a history of a severe allergic reaction to penicillin so cephalosporins I think would not be an ideal choice and he has not done well with macrolides.  I would be concerned about not covering standard pulmonary pathogens well with a tetracycline and for that reason I have prescribed Levaquin.  He may continue his albuterol via both neb treatments and HFA inhaler as needed up to every 4 hours and he can do this even more frequently for acute symptomatology, and also continue his Symbicort and Spiriva Respimat.

## 2021-03-25 ENCOUNTER — OFFICE VISIT (OUTPATIENT)
Dept: PULMONOLOGY | Facility: CLINIC | Age: 61
End: 2021-03-25

## 2021-03-25 VITALS
BODY MASS INDEX: 25.31 KG/M2 | WEIGHT: 176.8 LBS | HEIGHT: 70 IN | HEART RATE: 70 BPM | OXYGEN SATURATION: 97 % | DIASTOLIC BLOOD PRESSURE: 74 MMHG | SYSTOLIC BLOOD PRESSURE: 132 MMHG

## 2021-03-25 DIAGNOSIS — J44.9 COPD, SEVERE (HCC): ICD-10-CM

## 2021-03-25 DIAGNOSIS — F17.210 CIGARETTE NICOTINE DEPENDENCE WITHOUT COMPLICATION: ICD-10-CM

## 2021-03-25 DIAGNOSIS — R06.02 SHORTNESS OF BREATH: Primary | ICD-10-CM

## 2021-03-25 DIAGNOSIS — I48.0 PAROXYSMAL ATRIAL FIBRILLATION (HCC): ICD-10-CM

## 2021-03-25 DIAGNOSIS — G47.34 NOCTURNAL HYPOXEMIA: ICD-10-CM

## 2021-03-25 DIAGNOSIS — J44.1 COPD WITH ACUTE EXACERBATION (HCC): ICD-10-CM

## 2021-03-25 PROBLEM — I48.20 CHRONIC ATRIAL FIBRILLATION (HCC): Status: ACTIVE | Noted: 2021-03-25

## 2021-03-25 PROCEDURE — 99214 OFFICE O/P EST MOD 30 MIN: CPT | Performed by: INTERNAL MEDICINE

## 2021-03-25 RX ORDER — PREDNISONE 10 MG/1
10 TABLET ORAL DAILY
Qty: 90 TABLET | Refills: 3 | Status: SHIPPED | OUTPATIENT
Start: 2021-03-25 | End: 2021-06-28 | Stop reason: SDUPTHER

## 2021-03-25 NOTE — ASSESSMENT & PLAN NOTE
He has a history of atrial fibrillation by previous Holter monitor study.  He is on chronic anticoagulant therapy in the form of Xarelto and also is on diltiazem.

## 2021-03-25 NOTE — ASSESSMENT & PLAN NOTE
He will continue his Spiriva, and Symbicort, his as needed albuterol, and prednisone will be added to his regimen at a dose of 10 mg/day and he can try to cut back to 5 mg/day as he tolerates.  I did go over side effects of prednisone with him and he will also follow-up with Trevin Phelps on this.  He does state he can perform exertional tasks better when using the prednisone and also does well when he uses his albuterol HFA inhaler as needed when working or exerting himself.

## 2021-03-25 NOTE — ASSESSMENT & PLAN NOTE
His last overnight pulse ox study on oxygen still revealed a few episodes of desaturation so he clearly requires nocturnal oxygen based on that study.SCANNED - PULMONARY RESULTS (05/13/2019 00:00)

## 2021-03-25 NOTE — ASSESSMENT & PLAN NOTE
He definitely has improved compared to his last visit.  He does feel that the steroids are the major factor in his improvement rather than the antibiotics and I would agree with him on this

## 2021-03-25 NOTE — PROGRESS NOTES
Chief Complaint  COPD and Lung Nodule    Subjective    History of Present Illness {CC  Problem List  Visit Diagnosis   Encounters  Notes  Medications  Labs  Result Review Imaging  Media     Bin Oh presents to [facility] for shortness of breath associated with a recent exacerbation of his COPD.    History of Present Illness   The patient states the antibiotics he took in the form of Levaquin I have helped him a bit but he feels the steroids helped him much more so.  He has taken course of steroids in the past and has done much better when on these agents.  He is already on inhaled steroids.  He would like to try chronic oral steroids.  I told him we will try him on 10 mg a day and if he did well with that to try to go to 5 mg a day.  He is scheduled to see Trevin Phelps his primary healthcare provider next week and I told him I will send a note to Trevin regarding our plans to start the chronic oral steroid therapy as he will need some monitoring regarding blood sugars and bone density in particular.  Also advised him there would be a risk of cataracts over time.  He does state however that both he and his wife noted he did so much better on the prednisone that he would like to try this routinely at least for now.  I did again go over the options of Daliresp therapy in terms of preventing exacerbations but it would have no effect on his issues with shortness of breath and he would prefer to try the prednisone at least at this time.  Prior to Admission medications    Medication Sig Start Date End Date Taking? Authorizing Provider   albuterol (PROVENTIL) (2.5 MG/3ML) 0.083% nebulizer solution INHALE 1 VIAL PER NEBULIZER EVERY 6 HOURS AS NEEDED 7/30/20  Yes SharonBibi APRN   albuterol sulfate HFA (PROAIR HFA) 108 (90 Base) MCG/ACT inhaler Inhale 2 puffs Every 4 (Four) Hours As Needed for Wheezing or Shortness of Air. 5/13/19  Yes Korey Vyas  MD Arthur   budesonide-formoterol (SYMBICORT) 160-4.5 MCG/ACT inhaler Inhale 2 puffs 2 (Two) Times a Day. Rinse and spit after using. 2/18/20  Yes Korey Vyas MD   diazePAM (VALIUM) 10 MG tablet As Needed. 12/18/19  Yes Sanaz Bunch MD   dilTIAZem HCl Coated Beads (CARDIZEM CD PO) Take  by mouth.   Yes Sanaz Bunch MD   levoFLOXacin (Levaquin) 750 MG tablet Take 1 tablet by mouth Daily. 3/4/21  Yes Korey Vyas MD   O2 (OXYGEN) Inhale 2 L/min 1 (One) Time.   Yes Sanaz Bunch MD   predniSONE (DELTASONE) 10 MG tablet Take 4 tabs daily x 3 days, then take 3 tabs daily x 3 days, then take 2 tabs daily x 3 days, then take 1 tab daily x 3 days 3/4/21  Yes Korey Vyas MD   rivaroxaban (XARELTO) 20 MG tablet Take 20 mg by mouth Daily.   Yes Sanaz Bunch MD   sildenafil (VIAGRA) 100 MG tablet Take 100 mg by mouth Daily As Needed for erectile dysfunction.   Yes Sanaz Bunch MD   tamsulosin (FLOMAX) 0.4 MG capsule 24 hr capsule Take 1 capsule by mouth Every Night.   Yes Sanaz Bunch MD   tiotropium bromide monohydrate (SPIRIVA RESPIMAT) 2.5 MCG/ACT aerosol solution inhaler Inhale 2 puffs Daily.   Yes Sanaz Bunch MD   predniSONE (DELTASONE) 10 MG tablet Take 1 tablet by mouth Daily. 3/25/21   Korey Vyas MD       Social History     Socioeconomic History   • Marital status:      Spouse name: Not on file   • Number of children: Not on file   • Years of education: Not on file   • Highest education level: Not on file   Tobacco Use   • Smoking status: Current Every Day Smoker     Packs/day: 0.50     Years: 40.00     Pack years: 20.00     Types: Cigarettes     Start date: 1976   • Smokeless tobacco: Never Used   • Tobacco comment: 4 or 5 per week   Vaping Use   • Vaping Use: Never used   Substance and Sexual Activity   • Alcohol use: No   • Drug use: No   • Sexual activity: Defer       Objective   Vital Signs:   /74  "  Pulse 70   Ht 177.8 cm (70\")   Wt 80.2 kg (176 lb 12.8 oz)   SpO2 97% Comment: ra  BMI 25.37 kg/m²     Physical Exam  Vitals and nursing note reviewed.   Constitutional:       Appearance: Normal appearance.   HENT:      Head: Normocephalic.      Comments: He is wearing a mask.  Eyes:      Extraocular Movements: Extraocular movements intact.      Pupils: Pupils are equal, round, and reactive to light.   Cardiovascular:      Comments: Heart sounds are distant.  Pulmonary:      Comments: He has improved air movement on exam today with no adventitious sounds heard.  Abdominal:      General: Abdomen is flat.   Musculoskeletal:         General: Normal range of motion.      Cervical back: Normal range of motion.   Skin:     General: Skin is warm and dry.   Neurological:      General: No focal deficit present.      Mental Status: He is alert and oriented to person, place, and time.   Psychiatric:         Mood and Affect: Mood normal.        Result Review :{ Labs  Result Review  Imaging  Med Tab  Media :    PFT Values        Some values may be hidden. Unless noted otherwise, only the newest values recorded on each date are displayed.         Old Values PFT Results 20   No data to display.      Pre Drug PFT Results 20   FVC 62   FEV1 35   FEF 25-75% 10   FEV1/FVC 44.85      Post Drug PFT Results 20   No data to display.      Other Tests PFT Results 20   TLC 85      DLCO 36   D/VAsb 55               Results for orders placed in visit on 20    Pulmonary Function Test    Narrative  Pulmonary Function Test  Performed by: Korey Vyas MD  Authorized by: Korey Vyas MD    Pre Drug  FVC: 62%  FEV1: 35%  FEF 25-75%: 10%  FEV1/FVC: 44.85%  T%  RV: 134%  DLCO: 36%  D/VAsb: 55%      Results for orders placed during the hospital encounter of 19    Full Pulmonary Function Test With Bronchodilator & ABG    Narrative  [facility] - Pulmonary Function " Test    [address]  [place]  [address]  [phone]    Patient : Bin Oh  MRN : [id number]  [facility] : [id number]  Pulmonologist : Korey Vyas MD  Date : 2/21/2019    ______________________________________________________________________    Interpretation :  1.  Spirometry is consistent with a very severe obstructive ventilatory defect.  2.  There is improvement in spirometry postbronchodilator but a severe obstructive ventilatory defect is still present.  3.  Lung volumes reveal hyperinflation.  There is also a decrease in vital capacity secondary to obstruction and a decrease in inspiratory capacity.  4.  There is a moderate diffusion impairment which when corrected for alveolar volume is a mild diffusion impairment.  5.  Arterial blood gases are within normal limits on room air.      Korey Vyas MD                         Assessment and Plan {CC Problem List  Visit Diagnosis  ROS  Review (Popup)  [facility]  Quality  BestPractice  Medications  SmartSets  SnapShot Encounters  Media      Diagnoses and all orders for this visit:    1. Shortness of breath (Primary)  Assessment & Plan:  He did seem to do much better when on prednisone so we will try him on 10 mg of prednisone daily with the understanding he will try to taper down to 5 mg a day if possible.  Again he will follow-up with his primary healthcare provider regarding potential side effects of the chronic steroid therapy such as elevated blood sugar and decreased bone density in particular.      2. COPD, severe (CMS/[facility])  Assessment & Plan:  He will continue his Spiriva, and Symbicort, his as needed albuterol, and prednisone will be added to his regimen at a dose of 10 mg/day and he can try to cut back to 5 mg/day as he tolerates.  I did go over side effects of prednisone with him and he will also follow-up with Trevin Phelps on this.  He does state he can perform exertional tasks better when using the prednisone and  also does well when he uses his albuterol HFA inhaler as needed when working or exerting himself.      Orders:  -     predniSONE (DELTASONE) 10 MG tablet; Take 1 tablet by mouth Daily.  Dispense: 90 tablet; Refill: 3    3. COPD with acute exacerbation (CMS/HCC)  Assessment & Plan:  He definitely has improved compared to his last visit.  He does feel that the steroids are the major factor in his improvement rather than the antibiotics and I would agree with him on this          4. Nocturnal hypoxemia  Assessment & Plan:  His last overnight pulse ox study on oxygen still revealed a few episodes of desaturation so he clearly requires nocturnal oxygen based on that study.SCANNED - PULMONARY RESULTS (05/13/2019 00:00)        5. Paroxysmal atrial fibrillation (CMS/HCC)  Assessment & Plan:  He has a history of atrial fibrillation by previous Holter monitor study.  He is on chronic anticoagulant therapy in the form of Xarelto and also is on diltiazem.      6. Cigarette nicotine dependence without complication  Assessment & Plan:  He continues to smoke although he has cut back.  I did  him regarding smoking cessation.        Patient's Body mass index is 25.37 kg/m². BMI is within normal parameters. No follow-up required..          Korey Vyas MD  3/25/2021  11:57 CDT    Follow Up {Instructions Charge Capture  Follow-up Communications   Return in about 3 months (around 6/25/2021).    Patient was given instructions and counseling regarding his condition or for health maintenance advice. Please see specific information pulled into the AVS if appropriate.  Again, he will follow-up with his primary healthcare provider just for next week and I will send Trevin aguiar's office note regarding the plans to start the patient on chronic oral steroid therapy.  Otherwise I will see him back in 3 months and I did  him regarding smoking cessation for 2 minutes.

## 2021-03-25 NOTE — PATIENT INSTRUCTIONS
The patient is doing much better today and he believes and I would agree that it is predominantly due to the steroids he was taking.  He had much better exercise tolerance and he was breathing much better while he was on these.  He realizes that there are potential side effects of chronic steroid therapy but would like to try this.  The other option would be Daliresp which may help prevent exacerbations but will have any effect on his breathing and again we will try him on prednisone 10 mg a day with a prescription to be sent to his pharmacy for a 90-day supply with the understanding that if he does well he will try to go to 1/2 pill a day he will follow-up with his primary care providers next week particular Trevin Phelps and I told him I will send a note to Trevin as he will need to have his blood sugars followed and also bone density followed if he is on chronic steroid therapy.  Otherwise I will see him back in 3 months and he will continue his current inhaled medications and again we will send him in a prescription for the prednisone.

## 2021-03-25 NOTE — ASSESSMENT & PLAN NOTE
He did seem to do much better when on prednisone so we will try him on 10 mg of prednisone daily with the understanding he will try to taper down to 5 mg a day if possible.  Again he will follow-up with his primary healthcare provider regarding potential side effects of the chronic steroid therapy such as elevated blood sugar and decreased bone density in particular.

## 2021-06-28 ENCOUNTER — OFFICE VISIT (OUTPATIENT)
Dept: PULMONOLOGY | Facility: CLINIC | Age: 61
End: 2021-06-28

## 2021-06-28 VITALS
BODY MASS INDEX: 25.77 KG/M2 | WEIGHT: 180 LBS | OXYGEN SATURATION: 97 % | HEIGHT: 70 IN | SYSTOLIC BLOOD PRESSURE: 158 MMHG | DIASTOLIC BLOOD PRESSURE: 78 MMHG | HEART RATE: 69 BPM

## 2021-06-28 DIAGNOSIS — E66.3 OVERWEIGHT: ICD-10-CM

## 2021-06-28 DIAGNOSIS — J44.9 COPD, SEVERE (HCC): Primary | ICD-10-CM

## 2021-06-28 DIAGNOSIS — R91.8 LUNG NODULES: ICD-10-CM

## 2021-06-28 DIAGNOSIS — I48.0 PAROXYSMAL ATRIAL FIBRILLATION (HCC): ICD-10-CM

## 2021-06-28 DIAGNOSIS — G47.34 NOCTURNAL HYPOXEMIA: ICD-10-CM

## 2021-06-28 DIAGNOSIS — F17.210 CIGARETTE NICOTINE DEPENDENCE WITHOUT COMPLICATION: ICD-10-CM

## 2021-06-28 PROCEDURE — 99214 OFFICE O/P EST MOD 30 MIN: CPT | Performed by: INTERNAL MEDICINE

## 2021-06-28 RX ORDER — ALBUTEROL SULFATE 90 UG/1
2 AEROSOL, METERED RESPIRATORY (INHALATION)
Qty: 54 G | Refills: 3 | Status: SHIPPED | OUTPATIENT
Start: 2021-06-28 | End: 2021-11-15 | Stop reason: SDUPTHER

## 2021-06-28 RX ORDER — ALBUTEROL SULFATE 90 UG/1
2 AEROSOL, METERED RESPIRATORY (INHALATION)
Qty: 54 G | Refills: 3 | Status: SHIPPED | OUTPATIENT
Start: 2021-06-28 | End: 2021-06-28

## 2021-06-28 NOTE — ASSESSMENT & PLAN NOTE
I encouraged him to work on smoking cessation.  I did  him regarding smoking cessation for 2 minutes.

## 2021-06-28 NOTE — PATIENT INSTRUCTIONS
The patient is feeling better with the routine use of prednisone.  Again I stressed him to make sure his primary healthcare providers are following him regarding any potential issues with decreased bone density related to chronic steroid therapy.  He has gained a bit of weight and he states he actually felt he needed to gain some weight.  I did  him regarding smoking cessation.  We will plan on follow-up pulmonary functions here in the office in about 3 months.  Also advised him he can use either his home neb treatments or his albuterol HFA up to every 4 hours on a routine basis if need be.  He does state he does better when he does use either his nebulizer or rescue inhaler at least 4 times a day and sometimes more.  I did E prescribe refills of his albuterol HFA inhaler.

## 2021-06-28 NOTE — PROGRESS NOTES
Chief Complaint  Shortness of Breath    Subjective    History of Present Illness {CC  Problem List  Visit Diagnosis   Encounters  Notes  Medications  Labs  Result Review Imaging  Media     Bin Oh presents to [facility] PULMONARY [facility] CRITICAL CARE MEDICINE for shortness of breath associated with his COPD.    History of Present Illness   The patient at present states he is doing a bit better on the routine oral steroid therapy but still has episodes where he gets short of breath and has a sensation of not being able to completely exhale.  The symptoms are relieved with his neb treatments.  I told him he can use either his neb treatments or his albuterol HFA up to every 4 hours.  At times he states he may utilize albuterol 4 times a day and sometimes closer to every 4 hours but rarely takes them 6 times a day as he states he is not using these during the night.  Again I told him he could utilize either the neb treatments or 2 puffs of his albuterol HFA up to every 4 hours as needed.  He should continue his Spiriva and Symbicort and I did E prescribe refills of his albuterol HFA inhaler.  He is still smoking and I counseled him regarding smoking cessation for 2 minutes.  He states he has discussed his chronic oral steroid therapy with his primary care physician Dr. Fontaine and I told him that he may require some screening studies for bone density if he is taking oral steroids on a long-term basis.  He has not taken the COVID-19 vaccine.  Prior to Admission medications    Medication Sig Start Date End Date Taking? Authorizing Provider   albuterol (PROVENTIL) (2.5 MG/3ML) 0.083% nebulizer solution INHALE 1 VIAL PER NEBULIZER EVERY 6 HOURS AS NEEDED 7/30/20  Yes Bibi Herrera APRN   albuterol sulfate HFA (ProAir HFA) 108 (90 Base) MCG/ACT inhaler Inhale 2 puffs 4 (Four) Times a Day. 6/28/21  Yes Korey Vyas MD   budesonide-formoterol (SYMBICORT) 160-4.5 MCG/ACT inhaler  Inhale 2 puffs 2 (Two) Times a Day. Rinse and spit after using. 2/18/20  Yes Korey Vyas MD   diazePAM (VALIUM) 10 MG tablet As Needed. 12/18/19  Yes Sanaz Bunch MD   dilTIAZem HCl Coated Beads (CARDIZEM CD PO) Take  by mouth.   Yes Sanaz Bunch MD   O2 (OXYGEN) Inhale 2 L/min 1 (One) Time.   Yes Sanaz Bunch MD   predniSONE (DELTASONE) 10 MG tablet Take 4 tabs daily x 3 days, then take 3 tabs daily x 3 days, then take 2 tabs daily x 3 days, then take 1 tab daily x 3 days 3/4/21  Yes Korey Vyas MD   rivaroxaban (XARELTO) 20 MG tablet Take 20 mg by mouth Daily.   Yes Sanaz Bunch MD   sildenafil (VIAGRA) 100 MG tablet Take 100 mg by mouth Daily As Needed for erectile dysfunction.   Yes Sanaz Bunch MD   tiotropium bromide monohydrate (SPIRIVA RESPIMAT) 2.5 MCG/ACT aerosol solution inhaler Inhale 2 puffs Daily.   Yes Sanaz Bunch MD   albuterol sulfate HFA (PROAIR HFA) 108 (90 Base) MCG/ACT inhaler Inhale 2 puffs Every 4 (Four) Hours As Needed for Wheezing or Shortness of Air. 5/13/19 6/28/21 Yes Korey Vyas MD   levoFLOXacin (Levaquin) 750 MG tablet Take 1 tablet by mouth Daily. 3/4/21 6/28/21  Korey Vyas MD   predniSONE (DELTASONE) 10 MG tablet Take 1 tablet by mouth Daily. 3/25/21 6/28/21  Korey Vyas MD   tamsulosin (FLOMAX) 0.4 MG capsule 24 hr capsule Take 1 capsule by mouth Every Night.  6/28/21  Sanaz Bunch MD       Social History     Socioeconomic History   • Marital status:      Spouse name: Not on file   • Number of children: Not on file   • Years of education: Not on file   • Highest education level: Not on file   Tobacco Use   • Smoking status: Current Every Day Smoker     Packs/day: 0.25     Years: 40.00     Pack years: 10.00     Types: Cigarettes     Start date: 1976   • Smokeless tobacco: Never Used   • Tobacco comment: 4 or 5 per week   Vaping Use   • Vaping Use: Never used  "  Substance and Sexual Activity   • Alcohol use: No   • Drug use: No   • Sexual activity: Defer       Objective   Vital Signs:   /78   Pulse 69   Ht 177.8 cm (70\")   Wt 81.6 kg (180 lb)   SpO2 97% Comment: RA  BMI 25.83 kg/m²     Physical Exam   Result Review :{ Labs  Result Review  Imaging  Med Tab  Media :    PFT Values        Some values may be hidden. Unless noted otherwise, only the newest values recorded on each date are displayed.         Old Values PFT Results 20   No data to display.      Pre Drug PFT Results 20   FVC 62   FEV1 35   FEF 25-75% 10   FEV1/FVC 44.85      Post Drug PFT Results 20   No data to display.      Other Tests PFT Results 20   TLC 85      DLCO 36   D/VAsb 55               Results for orders placed in visit on 20    Pulmonary Function Test    Narrative  Pulmonary Function Test  Performed by: Korey Vyas MD  Authorized by: Korey Vyas MD    Pre Drug  FVC: 62%  FEV1: 35%  FEF 25-75%: 10%  FEV1/FVC: 44.85%  T%  RV: 134%  DLCO: 36%  D/VAsb: 55%      Results for orders placed during the hospital encounter of 19    Full Pulmonary Function Test With Bronchodilator & ABG    Narrative  [facility] - Pulmonary Function Test    [address] [place] [address]  [address]  [phone]    Patient : Bin Oh  MRN : [id number]  CSN : [id number]  Pulmonologist : Korey Vyas MD  Date : 2019    ______________________________________________________________________    Interpretation :  1.  Spirometry is consistent with a very severe obstructive ventilatory defect.  2.  There is improvement in spirometry postbronchodilator but a severe obstructive ventilatory defect is still present.  3.  Lung volumes reveal hyperinflation.  There is also a decrease in vital capacity secondary to obstruction and a decrease in inspiratory capacity.  4.  There is a moderate diffusion impairment which when corrected for " alveolar volume is a mild diffusion impairment.  5.  Arterial blood gases are within normal limits on room air.      Korey Vyas MD                 My interpretation of imaging:    CT Chest Without Contrast Diagnostic (03/02/2021 10:06)  We will continue yearly screening chest CTs as long as he meets criteria.  His last CT showed no new or suspicious nodules which is some patchy areas of opacities consistent with an inflammatory process likely related to his persistent tobacco use.      Assessment and Plan {CC Problem List  Visit Diagnosis  ROS  Review (Popup)  [facility]  Quality  BestPractice  Medications  SmartSets  SnapShot Encounters  Media      Diagnoses and all orders for this visit:    1. COPD, severe (CMS/HCC) (Primary)  Assessment & Plan:  He should continue his Symbicort and Spiriva and his as needed albuterol via either a nebulized treatment or his HFA inhaler up to every 4 hours.      Orders:  -     Discontinue: albuterol sulfate HFA (ProAir HFA) 108 (90 Base) MCG/ACT inhaler; Inhale 2 puffs 4 (Four) Times a Day.  Dispense: 54 g; Refill: 3  -     albuterol sulfate HFA (ProAir HFA) 108 (90 Base) MCG/ACT inhaler; Inhale 2 puffs 4 (Four) Times a Day.  Dispense: 54 g; Refill: 3  -     Full Pulmonary Function Test Without Bronchodilator; Future    2. Lung nodules  Assessment & Plan:  These were stable on his last chest CT.  We will continue yearly screening CTs as long as he meets criteria.      3. Nocturnal hypoxemia  Assessment & Plan:  He is on nocturnal oxygen therapy.  His O2 sats at rest on room air today are acceptable at 97%.      4. Paroxysmal atrial fibrillation (CMS/HCC)  Assessment & Plan:  His rhythm was regular on exam today.      5. Cigarette nicotine dependence without complication  Assessment & Plan:  I encouraged him to work on smoking cessation.  I did  him regarding smoking cessation for 2 minutes.      6. Overweight  Assessment & Plan:  The patient's  BMI is minimally elevated he will follow-up with his primary care physician and diet and exercise are encouraged.        Patient's Body mass index is 25.83 kg/m². indicating that he is overweight (BMI 25-29.9).Obesity is unchanged.  His BMI is minimally elevated and he will follow-up with his primary care physician on this and diet and exercise are encouraged.        Korey Vyas MD  6/28/2021  11:35 CDT    Follow Up {Instructions Charge Capture  Follow-up Communications   Return in about 3 months (around 9/28/2021) for Complete PFT, To see me specifically.    Patient was given instructions and counseling regarding his condition or for health maintenance advice. Please see specific information pulled into the AVS if appropriate.

## 2021-06-28 NOTE — ASSESSMENT & PLAN NOTE
He should continue his Symbicort and Spiriva and his as needed albuterol via either a nebulized treatment or his HFA inhaler up to every 4 hours.

## 2021-06-28 NOTE — ASSESSMENT & PLAN NOTE
The patient's BMI is minimally elevated he will follow-up with his primary care physician regarding this and diet and exercise are encouraged.

## 2021-06-28 NOTE — ASSESSMENT & PLAN NOTE
These were stable on his last chest CT.  We will continue yearly screening CTs as long as he meets criteria.

## 2021-09-22 ENCOUNTER — TRANSCRIBE ORDERS (OUTPATIENT)
Dept: LAB | Facility: HOSPITAL | Age: 61
End: 2021-09-22

## 2021-11-15 ENCOUNTER — OFFICE VISIT (OUTPATIENT)
Dept: PULMONOLOGY | Facility: CLINIC | Age: 61
End: 2021-11-15

## 2021-11-15 VITALS
OXYGEN SATURATION: 98 % | HEIGHT: 70 IN | BODY MASS INDEX: 24.62 KG/M2 | WEIGHT: 172 LBS | SYSTOLIC BLOOD PRESSURE: 118 MMHG | DIASTOLIC BLOOD PRESSURE: 78 MMHG | HEART RATE: 105 BPM

## 2021-11-15 DIAGNOSIS — F17.210 CIGARETTE NICOTINE DEPENDENCE WITHOUT COMPLICATION: ICD-10-CM

## 2021-11-15 DIAGNOSIS — Z12.2 SCREENING FOR MALIGNANT NEOPLASM OF RESPIRATORY ORGAN: ICD-10-CM

## 2021-11-15 DIAGNOSIS — R91.8 LUNG NODULES: ICD-10-CM

## 2021-11-15 DIAGNOSIS — J44.9 COPD, SEVERE (HCC): Primary | ICD-10-CM

## 2021-11-15 PROCEDURE — 99214 OFFICE O/P EST MOD 30 MIN: CPT | Performed by: INTERNAL MEDICINE

## 2021-11-15 RX ORDER — ALBUTEROL SULFATE 90 UG/1
2 AEROSOL, METERED RESPIRATORY (INHALATION)
Qty: 54 G | Refills: 3 | Status: SHIPPED | OUTPATIENT
Start: 2021-11-15 | End: 2022-03-16

## 2021-11-15 NOTE — ASSESSMENT & PLAN NOTE
He will continue his Spiriva and Symbicort.  He also may continue albuterol neb treatments and albuterol HFA as needed and I did E prescribe refills of his albuterol HFA inhaler.

## 2021-11-15 NOTE — PATIENT INSTRUCTIONS
The patient prefers to hold off on pulmonary functions for now.  I told him we will plan on a follow-up visit in about 4 months with a chest CT the day of return which will be just over a year since his last CT.  He will continue to work on smoking cessation.

## 2021-11-15 NOTE — ASSESSMENT & PLAN NOTE
He was counseled regarding smoking cessation for 2 minutes.  We will again get a screening chest CT in about 4 months.

## 2021-11-15 NOTE — ASSESSMENT & PLAN NOTE
He had some stable subcentimeter nodules noted on his last chest CT.  He did have some patchy areas of change felt to represent inflammation and again we will get a follow-up CT in about 4 months.

## 2021-11-15 NOTE — PROGRESS NOTES
Chief Complaint  COPD, severe    Subjective    History of Present Illness {CC  Problem List  Visit Diagnosis   Encounters  Notes  Medications  Labs  Result Review Imaging  Media     Bin Oh presents to [facility] for COPD.    History of Present Illness   The patient continues to smoke.  He is not having any acute respiratory tract complaints at this time but he does get short of breath with exertion.  He would be a candidate for screening chest CT on or after March 2 of next year and I will schedule him to see me in mid March with a repeat chest CT the day of the return visit.  He would prefer to hold off on pulmonary functions for now as he does not want to undergo preprocedure Covid testing.  He declines a flu shot and does not want to take the COVID-19 vaccine.  He has been taking prednisone on a regular basis but stopped this when he developed some issues with peripheral edema and this improved after he stopped the prednisone.  He states he will resume it if he has issues with increasing shortness of breath but may try to go on a lower dose at least if he has to take it on a long-term basis.  Prior to Admission medications    Medication Sig Start Date End Date Taking? Authorizing Provider   albuterol (PROVENTIL) (2.5 MG/3ML) 0.083% nebulizer solution INHALE 1 VIAL PER NEBULIZER EVERY 6 HOURS AS NEEDED 7/30/20  Yes Bibi Herrera APRN   albuterol sulfate HFA (ProAir HFA) 108 (90 Base) MCG/ACT inhaler Inhale 2 puffs 4 (Four) Times a Day. 11/15/21  Yes Korey Vyas MD   budesonide-formoterol (SYMBICORT) 160-4.5 MCG/ACT inhaler Inhale 2 puffs 2 (Two) Times a Day. Rinse and spit after using. 2/18/20  Yes Korey Vyas MD   diazePAM (VALIUM) 10 MG tablet As Needed. 12/18/19  Yes Sanaz Bunch MD   dilTIAZem HCl Coated Beads (CARDIZEM CD PO) Take  by mouth.   Yes Sanaz Bunch MD   O2 (OXYGEN) Inhale 2 L/min 1  "(One) Time.   Yes ProviderSanaz MD   predniSONE (DELTASONE) 10 MG tablet Take 4 tabs daily x 3 days, then take 3 tabs daily x 3 days, then take 2 tabs daily x 3 days, then take 1 tab daily x 3 days 3/4/21  Yes Korey Vyas MD   rivaroxaban (XARELTO) 20 MG tablet Take 20 mg by mouth Daily.   Yes ProviderSanaz MD   sildenafil (VIAGRA) 100 MG tablet Take 100 mg by mouth Daily As Needed for erectile dysfunction.   Yes Sanaz Bunch MD   tiotropium bromide monohydrate (SPIRIVA RESPIMAT) 2.5 MCG/ACT aerosol solution inhaler Inhale 2 puffs Daily.   Yes Sanaz Bunch MD   albuterol sulfate HFA (ProAir HFA) 108 (90 Base) MCG/ACT inhaler Inhale 2 puffs 4 (Four) Times a Day. 6/28/21 11/15/21 Yes Korey Vyas MD       Social History     Socioeconomic History   • Marital status:    Tobacco Use   • Smoking status: Current Every Day Smoker     Packs/day: 0.25     Years: 40.00     Pack years: 10.00     Types: Cigarettes     Start date: 1976   • Smokeless tobacco: Never Used   • Tobacco comment: 4 or 5 per day   Vaping Use   • Vaping Use: Never used   Substance and Sexual Activity   • Alcohol use: No   • Drug use: No   • Sexual activity: Defer       Objective   Vital Signs:   /78   Pulse 105   Ht 177.8 cm (70\")   Wt 78 kg (172 lb)   SpO2 98% Comment: RA  BMI 24.68 kg/m²     Physical Exam  Vitals and nursing note reviewed.   Constitutional:       Appearance: Normal appearance.   HENT:      Head: Normocephalic.      Comments: He is wearing a mask.  Eyes:      Extraocular Movements: Extraocular movements intact.      Pupils: Pupils are equal, round, and reactive to light.   Cardiovascular:      Rate and Rhythm: Normal rate and regular rhythm.   Pulmonary:      Effort: Pulmonary effort is normal.      Comments: Breath sounds are diminished but no adventitious sounds are heard.  Musculoskeletal:         General: Normal range of motion.   Skin:     General: Skin is warm " and dry.   Neurological:      General: No focal deficit present.      Mental Status: He is alert and oriented to person, place, and time.   Psychiatric:         Mood and Affect: Mood normal.         Behavior: Behavior normal.        Result Review :{ Labs  Result Review  Imaging  Med Tab  Media :          Results for orders placed in visit on 20    Pulmonary Function Test    Narrative  Pulmonary Function Test  Performed by: Korey Vyas MD  Authorized by: Korey Vyas MD    Pre Drug  FVC: 62%  FEV1: 35%  FEF 25-75%: 10%  FEV1/FVC: 44.85%  T%  RV: 134%  DLCO: 36%  D/VAsb: 55%      Results for orders placed during the hospital encounter of 19    Full Pulmonary Function Test With Bronchodilator & ABG    Narrative  [facility] - Pulmonary Function Test    [address] [place] [address]  [id number]  [phone]    Patient : Bin Oh  MRN : [id number]  CSN : [id number]  Pulmonologist : Korey Vyas MD  Date : 2019    ______________________________________________________________________    Interpretation :  1.  Spirometry is consistent with a very severe obstructive ventilatory defect.  2.  There is improvement in spirometry postbronchodilator but a severe obstructive ventilatory defect is still present.  3.  Lung volumes reveal hyperinflation.  There is also a decrease in vital capacity secondary to obstruction and a decrease in inspiratory capacity.  4.  There is a moderate diffusion impairment which when corrected for alveolar volume is a mild diffusion impairment.  5.  Arterial blood gases are within normal limits on room air.      Korey Vyas MD                 My interpretation of imaging:    CT Chest Without Contrast Diagnostic (2021 10:06)        Assessment and Plan {CC Problem List  Visit Diagnosis  ROS  Review (Popup)  Health Maintenance  Quality  BestPractice  Medications  SmartSets  SnapShot Encounters  Media       Diagnoses and all orders for this visit:    1. COPD, severe (HCC) (Primary)  Assessment & Plan:  He will continue his Spiriva and Symbicort.  He also may continue albuterol neb treatments and albuterol HFA as needed and I did E prescribe refills of his albuterol HFA inhaler.      Orders:  -     albuterol sulfate HFA (ProAir HFA) 108 (90 Base) MCG/ACT inhaler; Inhale 2 puffs 4 (Four) Times a Day.  Dispense: 54 g; Refill: 3    2. Lung nodules  Assessment & Plan:  He had some stable subcentimeter nodules noted on his last chest CT.  He did have some patchy areas of change felt to represent inflammation and again we will get a follow-up CT in about 4 months.      3. Cigarette nicotine dependence without complication  Assessment & Plan:  He was counseled regarding smoking cessation for 2 minutes.  We will again get a screening chest CT in about 4 months.    Orders:  -      CT Chest Low Dose Cancer Screening WO; Future    4. Screening for malignant neoplasm of respiratory organ  -      CT Chest Low Dose Cancer Screening WO; Future      Patient's Body mass index is 24.68 kg/m². indicating that he is within normal range (BMI 18.5-24.9). No BMI management plan needed..        Korey Vyas MD  2/24/2022  14:21 CST    Follow Up {Instructions Charge Capture  Follow-up Communications   Return in about 4 months (around 3/15/2022) for To see me specifically.    Patient was given instructions and counseling regarding his condition or for health maintenance advice. Please see specific information pulled into the AVS if appropriate.

## 2022-02-25 DIAGNOSIS — R91.8 LUNG NODULES: Primary | ICD-10-CM

## 2022-03-16 ENCOUNTER — APPOINTMENT (OUTPATIENT)
Dept: CT IMAGING | Facility: HOSPITAL | Age: 62
End: 2022-03-16

## 2022-03-16 ENCOUNTER — HOSPITAL ENCOUNTER (OUTPATIENT)
Dept: CT IMAGING | Facility: HOSPITAL | Age: 62
Discharge: HOME | End: 2022-03-16
Admitting: INTERNAL MEDICINE

## 2022-03-16 ENCOUNTER — HOSPITAL ENCOUNTER (OUTPATIENT)
Dept: CT IMAGING | Facility: HOSPITAL | Age: 62
End: 2022-03-16

## 2022-03-16 DIAGNOSIS — J44.9 COPD, SEVERE: ICD-10-CM

## 2022-03-16 DIAGNOSIS — R91.8 LUNG NODULES: ICD-10-CM

## 2022-03-16 PROCEDURE — 71250 CT THORAX DX C-: CPT

## 2022-03-16 RX ORDER — ALBUTEROL SULFATE 90 UG/1
2 AEROSOL, METERED RESPIRATORY (INHALATION)
Qty: 25.5 G | Refills: 11 | Status: SHIPPED | OUTPATIENT
Start: 2022-03-16 | End: 2022-03-17 | Stop reason: SDUPTHER

## 2022-03-16 NOTE — TELEPHONE ENCOUNTER
Pharmacy sent a request for refills on Albuterol HFA.   Rx Refill Note  Requested Prescriptions     Pending Prescriptions Disp Refills   • albuterol sulfate  (90 Base) MCG/ACT inhaler [Pharmacy Med Name: ALBUTEROL SULFATE HFA HFA AEROSOL SOLN]  3     Sig: INHALE 2 PUFFS 4 (FOUR) TIMES A DAY.      Last office visit with prescribing clinician: 11/15/2021      Next office visit with prescribing clinician: 3/17/2022            Vignesh Pacheco CMA  03/16/22, 10:20 CDT

## 2022-03-17 ENCOUNTER — OFFICE VISIT (OUTPATIENT)
Dept: PULMONOLOGY | Facility: CLINIC | Age: 62
End: 2022-03-17

## 2022-03-17 VITALS
HEART RATE: 89 BPM | WEIGHT: 168 LBS | OXYGEN SATURATION: 98 % | BODY MASS INDEX: 24.05 KG/M2 | SYSTOLIC BLOOD PRESSURE: 118 MMHG | HEIGHT: 70 IN | DIASTOLIC BLOOD PRESSURE: 76 MMHG

## 2022-03-17 DIAGNOSIS — J44.9 COPD, SEVERE: ICD-10-CM

## 2022-03-17 DIAGNOSIS — R91.8 LUNG NODULES: Primary | ICD-10-CM

## 2022-03-17 DIAGNOSIS — F17.210 CIGARETTE NICOTINE DEPENDENCE WITHOUT COMPLICATION: ICD-10-CM

## 2022-03-17 DIAGNOSIS — I48.0 PAROXYSMAL ATRIAL FIBRILLATION: ICD-10-CM

## 2022-03-17 PROCEDURE — 99214 OFFICE O/P EST MOD 30 MIN: CPT | Performed by: INTERNAL MEDICINE

## 2022-03-17 RX ORDER — ALBUTEROL SULFATE 90 UG/1
2 AEROSOL, METERED RESPIRATORY (INHALATION) EVERY 4 HOURS
Qty: 54 G | Refills: 5 | Status: SHIPPED | OUTPATIENT
Start: 2022-03-17 | End: 2023-03-17 | Stop reason: SDUPTHER

## 2022-03-17 NOTE — ASSESSMENT & PLAN NOTE
He will continue his Symbicort, his Spiriva Respimat, and his as needed albuterol neb treatments and albuterol HFA inhaler.  He states when he is working and developed shortness of breath with exertion related to this he will take up to 3 puffs of his albuterol HFA at a time.  I did tell him to watch for side effects such as tremulousness or elevated heart rate.  Also he can premedicate himself with albuterol prior to such activities to see if that would be of benefit.  He states sometimes he will do this with his neb treatments.  Again I told him just if he was using the albuterol HFA and that way to watch for potential side effects and to try to do that only as needed with exertion and not on a regular basis.  I did E prescribe a refill of his albuterol HFA inhaler.    
His rhythm is irregular on today's exam.  He is on chronic anticoagulant therapy and also diltiazem therapy for rate control.  
I did  him regarding smoking cessation for 2 minutes.  
These were stable on his chest CT done yesterday.  
no

## 2022-03-17 NOTE — PATIENT INSTRUCTIONS
The patient does have to use his neb treatments and rescue inhalers fairly frequently when he is performing exertional activities and sometimes will take up to 3 puffs of his inhaler at a time when he gets short of breath with such activities.  I did tell him just to watch for side effects  such as elevated heart rate or tremulousness.  I did E prescribe refills of his rescue inhaler.  His chest CT was stable from the standpoint of his lung nodules.  There was some concern of pulmonary hypertension.  He will follow-up with his primary care physician on this.  We discussed the possibility of a 2D echo he prefers to hold off.  Otherwise I will see him back in 6 months.

## 2022-03-17 NOTE — PROGRESS NOTES
Chief Complaint  COPD,severe    Subjective    History of Present Illness     Bin Oh presents to [facility] for COPD and lung nodules.    History of Present Illness   The patient's follow-up scan done yesterday showed stability of his small nodules.  Emphysematous changes were noted.  He also had evidence of atherosclerotic cardiovascular disease with cardiomegaly and dilated main pulmonary artery.  He does have chronic A. fib and is on anticoagulant therapy and also calcium channel blocker therapy for rate control.  I discussed the possibly referral to cardiology he prefers to hold off.  I did tell him he can continue his current inhaled medications and he was counseled regarding smoking cessation for 2 minutes.  We can get a follow-up CT in 1 year.  I discussed follow-up pulmonary functions with him and he states he does not want to have a preprocedure Covid test and in this regard we will hold off on PFTs for now.  Declines the flu shot and has not had the COVID-19 vaccine.  He has had the Pneumovax.  Prior to Admission medications    Medication Sig Start Date End Date Taking? Authorizing Provider   albuterol (PROVENTIL) (2.5 MG/3ML) 0.083% nebulizer solution INHALE 1 VIAL PER NEBULIZER EVERY 6 HOURS AS NEEDED 7/30/20  Yes Bibi Herrera APRN   albuterol sulfate  (90 Base) MCG/ACT inhaler Inhale 2 puffs Every 4 (Four) Hours. 3/17/22  Yes Korey Vyas MD   budesonide-formoterol (SYMBICORT) 160-4.5 MCG/ACT inhaler Inhale 2 puffs 2 (Two) Times a Day. Rinse and spit after using. 2/18/20  Yes Korey Vyas MD   diazePAM (VALIUM) 10 MG tablet As Needed. 12/18/19  Yes Sanaz Bunch MD   dilTIAZem HCl Coated Beads (CARDIZEM CD PO) Take 240 mg by mouth.   Yes Sanaz Bunch MD   O2 (OXYGEN) Inhale 2 L/min 1 (One) Time.   Yes Sanaz Bunch MD   rivaroxaban (XARELTO) 20 MG tablet Take 20 mg by mouth Daily.   Yes  "Provider, MD Sanaz   sildenafil (VIAGRA) 100 MG tablet Take 100 mg by mouth Daily As Needed for erectile dysfunction.   Yes Sanaz Bunch MD   tiotropium bromide monohydrate (SPIRIVA RESPIMAT) 2.5 MCG/ACT aerosol solution inhaler Inhale 2 puffs Daily.   Yes ProviderSanaz MD   albuterol sulfate  (90 Base) MCG/ACT inhaler INHALE 2 PUFFS 4 (FOUR) TIMES A DAY. 3/16/22 3/17/22 Yes Korey Vyas MD   predniSONE (DELTASONE) 10 MG tablet Take 4 tabs daily x 3 days, then take 3 tabs daily x 3 days, then take 2 tabs daily x 3 days, then take 1 tab daily x 3 days 3/4/21 3/17/22  Korey Vyas MD       Social History     Socioeconomic History   • Marital status:    Tobacco Use   • Smoking status: Current Every Day Smoker     Packs/day: 0.25     Years: 40.00     Pack years: 10.00     Types: Cigarettes     Start date: 1976   • Smokeless tobacco: Never Used   • Tobacco comment: 4 or 5 per day   Vaping Use   • Vaping Use: Never used   Substance and Sexual Activity   • Alcohol use: No   • Drug use: No   • Sexual activity: Defer       Objective   Vital Signs:   /76   Pulse 89   Ht 177.8 cm (70\")   Wt 76.2 kg (168 lb)   SpO2 98% Comment: RA  BMI 24.11 kg/m²     Physical Exam  Vitals and nursing note reviewed.   Constitutional:       Appearance: Normal appearance.   HENT:      Head: Normocephalic.      Comments: He is wearing a mask.  Eyes:      Extraocular Movements: Extraocular movements intact.      Pupils: Pupils are equal, round, and reactive to light.   Cardiovascular:      Rate and Rhythm: Normal rate. Rhythm irregular.      Comments: He has an irregularly irregular rhythm consistent with atrial fibrillation with a controlled ventricular response.  Abdominal:      General: Abdomen is flat.      Palpations: Abdomen is soft.   Musculoskeletal:         General: Normal range of motion.   Skin:     General: Skin is warm and dry.   Neurological:      General: No focal " deficit present.      Mental Status: He is alert and oriented to person, place, and time.   Psychiatric:         Mood and Affect: Mood normal.         Behavior: Behavior normal.        Result Review :          Results for orders placed in visit on 20    Pulmonary Function Test    Narrative  Pulmonary Function Test  Performed by: Korey Vyas MD  Authorized by: Korey Vyas MD    Pre Drug  FVC: 62%  FEV1: 35%  FEF 25-75%: 10%  FEV1/FVC: 44.85%  T%  RV: 134%  DLCO: 36%  D/VAsb: 55%      Results for orders placed during the hospital encounter of 19    Full Pulmonary Function Test With Bronchodilator & ABG    Narrative  [facility] - Pulmonary Function Test    [address] [facility]  [address]  [phone]    Patient : Bin Oh  MRN : [id number]  CSN : [id number]  Pulmonologist : Korey Vyas MD  Date : 2019    ______________________________________________________________________    Interpretation :  1.  Spirometry is consistent with a very severe obstructive ventilatory defect.  2.  There is improvement in spirometry postbronchodilator but a severe obstructive ventilatory defect is still present.  3.  Lung volumes reveal hyperinflation.  There is also a decrease in vital capacity secondary to obstruction and a decrease in inspiratory capacity.  4.  There is a moderate diffusion impairment which when corrected for alveolar volume is a mild diffusion impairment.  5.  Arterial blood gases are within normal limits on room air.      Korey Vyas MD                 My interpretation of imaging:    CT Chest Without Contrast Diagnostic (2022 13:06)          Assessment and Plan     Diagnoses and all orders for this visit:    1. Lung nodules (Primary)  Assessment & Plan:  These were stable on his chest CT done yesterday.      2. COPD, severe (HCC)  Assessment & Plan:  He will continue his Symbicort, his Spiriva Respimat, and his as needed  albuterol neb treatments and albuterol HFA inhaler.  He states when he is working and developed shortness of breath with exertion related to this he will take up to 3 puffs of his albuterol HFA at a time.  I did tell him to watch for side effects such as tremulousness or elevated heart rate.  Also he can premedicate himself with albuterol prior to such activities to see if that would be of benefit.  He states sometimes he will do this with his neb treatments.  Again I told him just if he was using the albuterol HFA and that way to watch for potential side effects and to try to do that only as needed with exertion and not on a regular basis.  I did E prescribe a refill of his albuterol HFA inhaler.      Orders:  -     albuterol sulfate  (90 Base) MCG/ACT inhaler; Inhale 2 puffs Every 4 (Four) Hours.  Dispense: 54 g; Refill: 5    3. Paroxysmal atrial fibrillation (HCC)  Assessment & Plan:  His rhythm is irregular on today's exam.  He is on chronic anticoagulant therapy and also diltiazem therapy for rate control.      4. Cigarette nicotine dependence without complication  Assessment & Plan:  I did  him regarding smoking cessation for 2 minutes.        Patient's Body mass index is 24.11 kg/m². indicating that he is within normal range (BMI 18.5-24.9). No BMI management plan needed..        Korey Vyas MD  3/17/2022  16:41 CDT    Follow Up   Return in about 6 months (around 9/17/2022) for To see me specifically.    Patient was given instructions and counseling regarding his condition or for health maintenance advice. Please see specific information pulled into the AVS if appropriate.

## 2022-09-22 DIAGNOSIS — J44.9 COPD, SEVERE: ICD-10-CM

## 2022-09-22 RX ORDER — PREDNISONE 10 MG/1
TABLET ORAL
Qty: 90 TABLET | Refills: 3 | Status: SHIPPED | OUTPATIENT
Start: 2022-09-22

## 2022-09-22 NOTE — TELEPHONE ENCOUNTER
Pharmacy sent a request for refills on Prednisone.   Rx Refill Note  Requested Prescriptions     Pending Prescriptions Disp Refills   • predniSONE (DELTASONE) 10 MG tablet [Pharmacy Med Name: PREDNISONE 10MG TABLET] 90 tablet 11     Sig: TAKE 1 TABLET BY MOUTH EVERY DAY WITH FOOD      Last office visit with prescribing clinician: 3/17/2022      Next office visit with prescribing clinician: 9/26/2022            Vignesh Pacheco CMA  09/22/22, 10:32 CDT

## 2022-09-26 ENCOUNTER — OFFICE VISIT (OUTPATIENT)
Dept: PULMONOLOGY | Facility: CLINIC | Age: 62
End: 2022-09-26

## 2022-09-26 VITALS
HEIGHT: 70 IN | HEART RATE: 57 BPM | SYSTOLIC BLOOD PRESSURE: 122 MMHG | DIASTOLIC BLOOD PRESSURE: 76 MMHG | OXYGEN SATURATION: 92 % | WEIGHT: 157 LBS | BODY MASS INDEX: 22.48 KG/M2

## 2022-09-26 DIAGNOSIS — I48.0 PAROXYSMAL ATRIAL FIBRILLATION: ICD-10-CM

## 2022-09-26 DIAGNOSIS — F17.210 CIGARETTE NICOTINE DEPENDENCE WITHOUT COMPLICATION: ICD-10-CM

## 2022-09-26 DIAGNOSIS — R06.02 SHORTNESS OF BREATH: ICD-10-CM

## 2022-09-26 DIAGNOSIS — J44.9 COPD, SEVERE: Primary | ICD-10-CM

## 2022-09-26 DIAGNOSIS — Z12.2 SCREENING FOR MALIGNANT NEOPLASM OF RESPIRATORY ORGAN: ICD-10-CM

## 2022-09-26 DIAGNOSIS — R91.8 LUNG NODULES: ICD-10-CM

## 2022-09-26 PROCEDURE — 99214 OFFICE O/P EST MOD 30 MIN: CPT | Performed by: INTERNAL MEDICINE

## 2022-09-26 RX ORDER — TIOTROPIUM BROMIDE INHALATION SPRAY 3.12 UG/1
2 SPRAY, METERED RESPIRATORY (INHALATION)
Qty: 2 EACH | Refills: 0 | COMMUNITY
Start: 2022-09-26

## 2022-09-26 NOTE — PATIENT INSTRUCTIONS
The patient is having increasing issues with shortness of breath with exertion.  He has a lot of sinus congestion and drainage as well.  I did tell him he could increase his prednisone to 20 mg daily short-term particularly is having any allergy symptoms.  Also I will have him come back for pulmonary functions here in the office sometime in the next few weeks and call him with results.  He will be a candidate for a follow-up screening CT in March and I will schedule that to be performed for when he returns.  Samples of Spiriva Respimat were provided.  There also has been some question of possible pulmonary hypertension on his chest CT and he had been on Viagra in the past which seemed to help his breathing and I told him that sildenafil under the brand name Revatio was a treatment for pulmonary hypertension but only for group 1 and if he had group 2 due to his heart disease or group 3 due to COPD the drug was not approved for such purposes.  If there is a question of any specific therapy for pulmonary hypertension particularly if there was some question of Group 1 pulmonary hypertension I would recommend he go through his cardiologist for at least a 2D echo and then possibly a right heart cath.

## 2022-09-27 NOTE — ASSESSMENT & PLAN NOTE
His rhythm is irregular today.  He is on Xarelto and also diltiazem.  I did advise him he needs to follow-up with his primary care physician about becoming reestablished with cardiology.

## 2022-09-27 NOTE — ASSESSMENT & PLAN NOTE
Although I am certain his COPD is a significant contributing factor there could be a cardiac component as well and I advised him to follow-up with his primary care physician about having cardiology reevaluation.  He had been seen by Dr. Martin in the past.

## 2022-09-27 NOTE — ASSESSMENT & PLAN NOTE
These are stable on his last chest CT.  A repeat CT will be performed for lung cancer screening purposes when he returns next March.

## 2022-09-27 NOTE — PROGRESS NOTES
Chief Complaint  lung nodules, COPD, and Shortness of Breath    Subjective    History of Present Illness     Bin Oh presents to [facility] for COPD with associated shortness of breath, and lung nodules.    History of Present Illness   The patient is having increasing issues with shortness of breath.  He states this has been worse recently and he attributes some of this to allergy symptoms and wondered if he could increase his prednisone at least temporarily up to 20 mg a day and I told him that he could certainly do so.  I had previously recommended pulmonary functions but because of the need for a preprocedure COVID test he had declined.  Now that a COVID test is not required he states he will agree to the PFTs and I will schedule those to be performed in the office in a few weeks.  He would be a candidate for a follow-up screening CT next March and I will schedule follow-up appoint with that as well.  In terms of his COPD again I will get the pulmonary functions and he will continue his inhaled medications but the major recommendation is that he discontinue smoking.  A previous CT had suggested the possibly of pulmonary hypertension and he actually states he had been on Viagra in the past for erectile dysfunction and that did seem to help his breathing.  He is aware that this agent can be used for pulmonary hypertension but I told him that would not be for any component of pulmonary hypertension associated with either his atrial fibrillation or his COPD.  I would recommend he follow-up with cardiology and apparently he was seen by Dr. Martin in the past but will need to be seeing a new cardiologist as Dr. Martin is in the process of retiring.  I advised him to follow-up with his primary care physician regarding any necessary referral for cardiology reevaluation.  He has paroxysmal atrial fibrillation and his rate is irregular today and fluctuates from  approximately 60-90.  He has had the Pneumovax previously and has declined the flu shot previously.  Prior to Admission medications    Medication Sig Start Date End Date Taking? Authorizing Provider   albuterol (PROVENTIL) (2.5 MG/3ML) 0.083% nebulizer solution INHALE 1 VIAL PER NEBULIZER EVERY 6 HOURS AS NEEDED 7/30/20  Yes SharonBibi APRN   albuterol sulfate  (90 Base) MCG/ACT inhaler Inhale 2 puffs Every 4 (Four) Hours. 3/17/22  Yes Korey Vyas MD   budesonide-formoterol (SYMBICORT) 160-4.5 MCG/ACT inhaler Inhale 2 puffs 2 (Two) Times a Day. Rinse and spit after using. 2/18/20  Yes Korey Vyas MD   diazePAM (VALIUM) 10 MG tablet As Needed. 12/18/19  Yes Sanaz Bunch MD   dilTIAZem HCl Coated Beads (CARDIZEM CD PO) Take 240 mg by mouth.   Yes Sanaz Bunch MD   O2 (OXYGEN) Inhale 2 L/min 1 (One) Time.   Yes Sanaz Bunch MD   predniSONE (DELTASONE) 10 MG tablet TAKE 1 TABLET BY MOUTH EVERY DAY WITH FOOD 9/22/22  Yes Korey Vyas MD   rivaroxaban (XARELTO) 20 MG tablet Take 20 mg by mouth Daily.   Yes Sanaz Bunch MD   sildenafil (VIAGRA) 100 MG tablet Take 100 mg by mouth Daily As Needed for erectile dysfunction.   Yes Sanaz Bunch MD   tiotropium bromide monohydrate (SPIRIVA RESPIMAT) 2.5 MCG/ACT aerosol solution inhaler Inhale 2 puffs Daily.   Yes Sanaz Bunch MD   tiotropium bromide monohydrate (Spiriva Respimat) 2.5 MCG/ACT aerosol solution inhaler Inhale 2 puffs Daily. 9/26/22   Korey Vyas MD       Social History     Socioeconomic History   • Marital status:    Tobacco Use   • Smoking status: Current Every Day Smoker     Packs/day: 1.00     Years: 40.00     Pack years: 40.00     Types: Cigarettes     Start date: 1/1/1976   • Smokeless tobacco: Never Used   • Tobacco comment: He currently is smoking 4 to 5 cigarettes/day but in the past has smoked up to a pack of cigarettes per day and has  "well over 30-pack-year history of smoking.   Vaping Use   • Vaping Use: Never used   Substance and Sexual Activity   • Alcohol use: No   • Drug use: No   • Sexual activity: Yes     Partners: Female       Objective   Vital Signs:   /76   Pulse 57   Ht 177.8 cm (70\")   Wt 71.2 kg (157 lb)   SpO2 92% Comment: RA  BMI 22.53 kg/m²     Physical Exam  Vitals and nursing note reviewed.   Constitutional:       Comments: His rhythm is irregular.   HENT:      Head: Normocephalic.      Comments: He is wearing a mask.  Eyes:      Pupils: Pupils are equal, round, and reactive to light.   Cardiovascular:      Rate and Rhythm: Rhythm irregular.      Comments: His rhythm is irregular consistent with atrial fibrillation and fluctuates from the high 50s to the low 90s.  Pulmonary:      Effort: Pulmonary effort is normal.      Comments: Breath sounds are diminished throughout.  Musculoskeletal:         General: Normal range of motion.   Skin:     General: Skin is warm and dry.   Neurological:      General: No focal deficit present.      Mental Status: He is alert and oriented to person, place, and time.   Psychiatric:         Mood and Affect: Mood normal.         Behavior: Behavior normal.        Result Review :          Results for orders placed in visit on 20    Pulmonary Function Test    Narrative  Pulmonary Function Test  Performed by: Korey Vyas MD  Authorized by: Korey Vyas MD    Pre Drug  FVC: 62%  FEV1: 35%  FEF 25-75%: 10%  FEV1/FVC: 44.85%  T%  RV: 134%  DLCO: 36%  D/VAsb: 55%      Results for orders placed during the hospital encounter of 19    Full Pulmonary Function Test With Bronchodilator & ABG    Narrative  [facility] - Pulmonary Function Test    [address] [place] [address]  [place]  [address]  [phone]    Patient : Bin Oh  MRN : [id number]  CSN : [id number]  Pulmonologist : Korey Vyas MD  Date : " 2/21/2019    ______________________________________________________________________    Interpretation :  1.  Spirometry is consistent with a very severe obstructive ventilatory defect.  2.  There is improvement in spirometry postbronchodilator but a severe obstructive ventilatory defect is still present.  3.  Lung volumes reveal hyperinflation.  There is also a decrease in vital capacity secondary to obstruction and a decrease in inspiratory capacity.  4.  There is a moderate diffusion impairment which when corrected for alveolar volume is a mild diffusion impairment.  5.  Arterial blood gases are within normal limits on room air.      Korey Vyas MD                 My interpretation of imaging:    CT Chest Without Contrast Diagnostic (03/16/2022 13:06)      Assessment and Plan     Diagnoses and all orders for this visit:    1. COPD, severe (HCC) (Primary)  Assessment & Plan:  He will return to the office for pulmonary functions on October 10.  He will continue his as needed albuterol, his Spiriva Respimat 2.5 micro-ohm inhaler with samples provided, and his Symbicort.  Also inquired about increasing his prednisone dose to 20 mg a day at least when he has more symptoms I told that was fine but to try to taper back to 10 mg a day once his symptoms improve.        Orders:  -     tiotropium bromide monohydrate (Spiriva Respimat) 2.5 MCG/ACT aerosol solution inhaler; Inhale 2 puffs Daily.  Dispense: 2 each; Refill: 0  -     Full Pulmonary Function Test Without Bronchodilator; Future    2. Shortness of breath  Assessment & Plan:  Although I am certain his COPD is a significant contributing factor there could be a cardiac component as well and I advised him to follow-up with his primary care physician about having cardiology reevaluation.  He had been seen by Dr. Martin in the past.      3. Lung nodules  Assessment & Plan:  These are stable on his last chest CT.  A repeat CT will be performed for lung cancer  screening purposes when he returns next March.      4. Paroxysmal atrial fibrillation (HCC)  Assessment & Plan:  His rhythm is irregular today.  He is on Xarelto and also diltiazem.  I did advise him he needs to follow-up with his primary care physician about becoming reestablished with cardiology.      5. Cigarette nicotine dependence without complication  Assessment & Plan:  I did  him regarding smoking cessation for 2 minutes.    Orders:  -      CT Chest Low Dose Cancer Screening WO; Future    6. Screening for malignant neoplasm of respiratory organ  -      CT Chest Low Dose Cancer Screening WO; Future        Korey Vyas MD  9/26/2022  19:18 CDT    Follow Up   Return in about 6 months (around 3/26/2023) for To see me specifically.    Patient was given instructions and counseling regarding his condition or for health maintenance advice. Please see specific information pulled into the AVS if appropriate.

## 2022-09-27 NOTE — ASSESSMENT & PLAN NOTE
He will return to the office for pulmonary functions on October 10.  He will continue his as needed albuterol, his Spiriva Respimat 2.5 micro-ohm inhaler with samples provided, and his Symbicort.  Also inquired about increasing his prednisone dose to 20 mg a day at least when he has more symptoms I told that was fine but to try to taper back to 10 mg a day once his symptoms improve.

## 2022-09-30 ENCOUNTER — TELEPHONE (OUTPATIENT)
Dept: PULMONOLOGY | Facility: CLINIC | Age: 62
End: 2022-09-30

## 2022-10-10 ENCOUNTER — OFFICE VISIT (OUTPATIENT)
Dept: PULMONOLOGY | Facility: CLINIC | Age: 62
End: 2022-10-10

## 2022-10-10 DIAGNOSIS — J44.9 COPD, SEVERE: ICD-10-CM

## 2022-10-10 PROCEDURE — 94729 DIFFUSING CAPACITY: CPT | Performed by: INTERNAL MEDICINE

## 2022-10-10 PROCEDURE — 94727 GAS DIL/WSHOT DETER LNG VOL: CPT | Performed by: INTERNAL MEDICINE

## 2022-10-10 PROCEDURE — 94010 BREATHING CAPACITY TEST: CPT | Performed by: INTERNAL MEDICINE

## 2022-10-10 NOTE — PROCEDURES
Full Pulmonary Function Test Without Bronchodilator  Performed by: Ana Lilia Dubon, RRT  Authorized by: Korey Vyas MD      Pre Drug % Predicted    FVC: 57%   FEV1: 32%   FEF 25-75%: 12%   FEV1/FVC: 44%   T%   RV: 162%   DLCO: 39%   D/VAsb: 52%    Interpretation   Spirometry   Spirometry shows severe obstruction.   Lung Volume Measurements  Measurements show elevated residual volume consistent with gas trapping.   Diffusion Capacity  The patient's diffusion capacity is severely reduced.  Diffusion capacity is moderately reduced when corrected for alveolar volume.   Overall comments: The patient's spirometry is consistent with a severe bordering on very severe obstructive ventilatory defect.  Lung volumes reveal hyperinflation.  There is also a decrease in vital capacity secondary to obstruction.  A decrease in inspiratory capacity is also present.  There is a severe diffusion impairment which when corrected for alveolar volume is a moderate diffusion impairment.  When current studies are compared to [facility], there has been a decline in the patient's FVC and FEV1 compared to previous.  The degree of hyperinflation is more pronounced compared to previous.  When corrected for alveolar volume there is no significant change in diffusion capacity compared to previous.

## 2022-10-11 NOTE — PROGRESS NOTES
I did call the patient to give him his pulmonary function results and was transferred to [facility].  I did leave a message regarding the results and advised him I will try to call him back in the next 1 to 2 days to see if he had any questions.  He should continue his current inhaled medication regimen and his prednisone therapy and should continue to work on smoking cessation.

## 2022-10-13 ENCOUNTER — TELEPHONE (OUTPATIENT)
Dept: PULMONOLOGY | Facility: CLINIC | Age: 62
End: 2022-10-13

## 2022-10-13 NOTE — TELEPHONE ENCOUNTER
I did call the patient back today and was able to speak to him regarding his pulmonary function study results.  I went over his current results with him and in particular I went over his current results with comparison to previous results.  He will continue his current regimen and work on smoking cessation.

## 2023-02-06 NOTE — TELEPHONE ENCOUNTER
Spoke to Katya at [facility] and she said that the CPT code for the low dose chest ct scan is not payable by [facility] but I had her run the CPT code 13646 and it is payable and the diagnosis codes are also covered.  Will need new order for the CT CHEST WO CONTRAST.  Thanks    It is already scheduled on the low dose chest ct scan order but will call scheduling to link the new order if that's how you want to proceed.

## 2023-02-19 DIAGNOSIS — R91.8 LUNG NODULES: Primary | ICD-10-CM

## 2023-03-17 DIAGNOSIS — J44.9 COPD, SEVERE: ICD-10-CM

## 2023-03-17 NOTE — TELEPHONE ENCOUNTER
Pharmacy sent a request for refills on Albuterol HFA. Left voicemail for patient to call back. Routed to Dr. Vyas.  Rx Refill Note  Requested Prescriptions     Pending Prescriptions Disp Refills   • albuterol sulfate  (90 Base) MCG/ACT inhaler 54 g 5     Sig: Inhale 2 puffs Every 4 (Four) Hours.      Last office visit with prescribing clinician: 9/26/2022   Last telemedicine visit with prescribing clinician: 3/17/2023   Next office visit with prescribing clinician: 3/28/2023                         Would you like a call back once the refill request has been completed: [] Yes [] No    If the office needs to give you a call back, can they leave a voicemail: [] Yes [] No    Vignesh Pacheco CMA  03/17/23, 15:04 CDT

## 2023-03-17 NOTE — TELEPHONE ENCOUNTER
Pharmacy sent a request for refills on Albuterol nebulizer solution. Left message for patient to call back.  Routed to Dr. Vyas.  Rx Refill Note  Requested Prescriptions     Pending Prescriptions Disp Refills   • albuterol (PROVENTIL) (2.5 MG/3ML) 0.083% nebulizer solution 360 mL 11      Last office visit with prescribing clinician: 9/26/2022  Last telemedicine visit with prescribing clinician: 3/28/2023   Next office visit with prescribing clinician: 03/28/2023                       Would you like a call back once the refill request has been completed: [] Yes [] No    If the office needs to give you a call back, can they leave a voicemail: [] Yes [] No    Vignesh Pacheco, MORENA  03/17/23, 15:24 CDT

## 2023-03-18 RX ORDER — ALBUTEROL SULFATE 2.5 MG/3ML
2.5 SOLUTION RESPIRATORY (INHALATION) EVERY 4 HOURS PRN
Qty: 360 ML | Refills: 11 | Status: SHIPPED | OUTPATIENT
Start: 2023-03-18

## 2023-03-18 RX ORDER — ALBUTEROL SULFATE 90 UG/1
2 AEROSOL, METERED RESPIRATORY (INHALATION) EVERY 4 HOURS
Qty: 54 G | Refills: 5 | Status: SHIPPED | OUTPATIENT
Start: 2023-03-18

## 2023-03-22 ENCOUNTER — HOSPITAL ENCOUNTER (OUTPATIENT)
Dept: CT IMAGING | Facility: HOSPITAL | Age: 63
Discharge: HOME | End: 2023-03-22
Admitting: INTERNAL MEDICINE
Payer: OTHER MISCELLANEOUS

## 2023-03-22 DIAGNOSIS — R91.8 LUNG NODULES: ICD-10-CM

## 2023-03-22 PROCEDURE — 71250 CT THORAX DX C-: CPT

## 2023-03-28 ENCOUNTER — OFFICE VISIT (OUTPATIENT)
Dept: PULMONOLOGY | Facility: CLINIC | Age: 63
End: 2023-03-28
Payer: OTHER MISCELLANEOUS

## 2023-03-28 VITALS
HEART RATE: 79 BPM | SYSTOLIC BLOOD PRESSURE: 122 MMHG | DIASTOLIC BLOOD PRESSURE: 78 MMHG | WEIGHT: 153 LBS | HEIGHT: 70 IN | OXYGEN SATURATION: 90 % | BODY MASS INDEX: 21.9 KG/M2

## 2023-03-28 DIAGNOSIS — I48.0 PAROXYSMAL ATRIAL FIBRILLATION: Chronic | ICD-10-CM

## 2023-03-28 DIAGNOSIS — R09.02 HYPOXEMIA REQUIRING SUPPLEMENTAL OXYGEN: Chronic | ICD-10-CM

## 2023-03-28 DIAGNOSIS — J40 BRONCHITIS: ICD-10-CM

## 2023-03-28 DIAGNOSIS — J44.9 COPD, SEVERE: Primary | Chronic | ICD-10-CM

## 2023-03-28 DIAGNOSIS — F17.210 CIGARETTE NICOTINE DEPENDENCE WITHOUT COMPLICATION: Chronic | ICD-10-CM

## 2023-03-28 DIAGNOSIS — R91.8 LUNG NODULES: Chronic | ICD-10-CM

## 2023-03-28 DIAGNOSIS — Z99.81 HYPOXEMIA REQUIRING SUPPLEMENTAL OXYGEN: Chronic | ICD-10-CM

## 2023-03-28 PROCEDURE — 99214 OFFICE O/P EST MOD 30 MIN: CPT | Performed by: INTERNAL MEDICINE

## 2023-03-28 RX ORDER — ALBUTEROL SULFATE 90 UG/1
2 AEROSOL, METERED RESPIRATORY (INHALATION) EVERY 4 HOURS PRN
Qty: 18 G | Refills: 11 | Status: SHIPPED | OUTPATIENT
Start: 2023-03-28

## 2023-03-28 RX ORDER — AZITHROMYCIN 250 MG/1
TABLET, FILM COATED ORAL
Qty: 6 TABLET | Refills: 0 | Status: SHIPPED | OUTPATIENT
Start: 2023-03-28

## 2023-03-28 NOTE — ASSESSMENT & PLAN NOTE
His sats were in the high 80s to 90% initially when he came into the office.  This may relate to his recent episode of bronchitis.  He can continue his nocturnal oxygen therapy and use it as needed during the day.  Again I encouraged him as the utmost importance of quitting smoking.

## 2023-03-28 NOTE — ASSESSMENT & PLAN NOTE
His rhythm is irregular today.  He is on Xarelto and diltiazem and should continue these agents and follow-up with his other physicians regarding his atrial fibrillation.

## 2023-03-28 NOTE — ASSESSMENT & PLAN NOTE
And his recent PFTs were consistent with a severe obstructive ventilatory defect.  He will continue his Symbicort, his Spiriva, and his as needed albuterol HFA or albuterol neb treatments.

## 2023-03-28 NOTE — PATIENT INSTRUCTIONS
The patient's chest CT was stable I reviewed it with him.  He would like to get a follow-up scan in 6 months and I told him if we use a screening diagnosis that would not be covered but I can see if we can get it covered through the [facility] based on the lung nodule diagnosis.  He recently has completed a course of Zithromax and has persistent bronchitic symptoms and would like another prescription I will send that in.  A total of these problems persist to check back and we will try different antibiotic.  Otherwise I will see him back in 6 months for pulmonary functions and we will try to get a follow-up CT on return regarding his lung nodules.  He would like a printed prescription for an albuterol HFA inhaler provided that to him.

## 2023-03-28 NOTE — ASSESSMENT & PLAN NOTE
He states he has cut his cigarette consumption back to about 4 to 5 cigarettes/day.  I counseled him as the importance of total smoking cessation for 2 minutes.

## 2023-03-28 NOTE — PROGRESS NOTES
Chief Complaint  COPD, severe and Lung nodules    Subjective    History of Present Illness     Bin Oh presents to [facility] for severe COPD and lung nodules.    History of Present Illness   Patient had his chest CT last week and I reviewed it and we called him with the results and it showed stability of his small nodules.  I reviewed the scan with him again today.  He would like to have a follow-up scan done in 6 months and I told him I will try to order this although would not meet criteria for lung cancer screening but may meet criteria for follow-up of lung nodules.  He unfortunately continues to smoke but states he is cut down to about 4 to 5 cigarettes/day.  He also stated he was concerned about weight loss and about the possibility of occult malignancy and inquired about a possible PET scan.  I told him he would not be indicated for his stable chest CT but if there was a concern about a malignancy outside the chest he should follow-up with his primary care physician regarding appropriate screening studies.  He has had some bronchitic symptoms recently which improved with a Z-James but he has persistent symptoms and would like a refill on E prescribe this.  I advised him if he had persistent problems with purulent sputum production despite a Z-James we could try different antibiotic.  I did tell him I will see him in about 6 months with pulmonary functions and we will try to get a follow-up chest CT.  He still has issues with tremor and was prescribed Mysoline by Dr. Darshan Hansen but has elected not to take it due to potential side effects.  Again I did advise him that his CT was stable and if there was a change in the nodule in the future then a PET scan might be indicated but it would not be indicated at this time based on the size and in particular the stability of his lung nodules.  He has had the Pneumovax in the past and has refused a flu shot  previously.  Prior to Admission medications    Medication Sig Start Date End Date Taking? Authorizing Provider   albuterol (PROVENTIL) (2.5 MG/3ML) 0.083% nebulizer solution Take 2.5 mg by nebulization Every 4 (Four) Hours As Needed for Wheezing. 3/18/23  Yes Korey Vyas MD   albuterol sulfate  (90 Base) MCG/ACT inhaler Inhale 2 puffs Every 4 (Four) Hours. 3/18/23  Yes Korey Vyas MD   budesonide-formoterol (SYMBICORT) 160-4.5 MCG/ACT inhaler Inhale 2 puffs 2 (Two) Times a Day. Rinse and spit after using. 2/18/20  Yes Korey Vyas MD   diazePAM (VALIUM) 10 MG tablet As Needed. 12/18/19  Yes Sanaz Bunch MD   dilTIAZem HCl Coated Beads (CARDIZEM CD PO) Take 240 mg by mouth.   Yes aSnaz Bunch MD   O2 (OXYGEN) Inhale 2 L/min At Night As Needed.   Yes Sanaz Bunch MD   predniSONE (DELTASONE) 10 MG tablet TAKE 1 TABLET BY MOUTH EVERY DAY WITH FOOD 9/22/22  Yes Korey Vyas MD   rivaroxaban (XARELTO) 20 MG tablet Take 1 tablet by mouth Daily.   Yes Sanaz Bunch MD   sildenafil (VIAGRA) 100 MG tablet Take 1 tablet by mouth Daily As Needed for Erectile Dysfunction.   Yes Sanaz Bunch MD   tiotropium bromide monohydrate (SPIRIVA RESPIMAT) 2.5 MCG/ACT aerosol solution inhaler Inhale 2 puffs Daily.   Yes Sanaz Bunch MD   albuterol sulfate  (90 Base) MCG/ACT inhaler Inhale 2 puffs Every 4 (Four) Hours As Needed for Wheezing. 3/28/23   Korey Vyas MD   azithromycin (ZITHROMAX) 250 MG tablet Take 2 by mouth today then 1 daily for 4 days 3/28/23   Korey Vyas MD   tiotropium bromide monohydrate (Spiriva Respimat) 2.5 MCG/ACT aerosol solution inhaler Inhale 2 puffs Daily.  Patient not taking: Reported on 3/28/2023 9/26/22   Korey Vyas MD       Social History     Socioeconomic History   • Marital status:    Tobacco Use   • Smoking status: Every Day     Packs/day: 0.25     Years:  "40.00     Pack years: 10.00     Types: Cigarettes     Start date: 1/1/1976   • Smokeless tobacco: Never   • Tobacco comments:     He currently is smoking 4 to 5 cigarettes/day but in the past has smoked up to a pack of cigarettes per day and has well over 30-pack-year history of smoking.   Vaping Use   • Vaping Use: Never used   Substance and Sexual Activity   • Alcohol use: No   • Drug use: No   • Sexual activity: Yes     Partners: Female       Objective   Vital Signs:   /78   Pulse 79   Ht 177.8 cm (70\")   Wt 69.4 kg (153 lb)   SpO2 90% Comment: RA  BMI 21.95 kg/m²     Physical Exam  Vitals and nursing note reviewed.   HENT:      Head: Normocephalic.   Eyes:      Extraocular Movements: Extraocular movements intact.      Pupils: Pupils are equal, round, and reactive to light.   Cardiovascular:      Rate and Rhythm: Normal rate. Rhythm irregular.   Pulmonary:      Effort: Pulmonary effort is normal.      Comments: Breath sounds are diminished but no adventitious sounds are heard.  Musculoskeletal:         General: Normal range of motion.   Skin:     General: Skin is warm and dry.   Neurological:      Mental Status: He is alert.      Comments: He does have an essential tremor of the arms in particular.   Psychiatric:         Mood and Affect: Mood normal.         Behavior: Behavior normal.        Result Review :    PFT Values        Some values may be hidden. Unless noted otherwise, only the newest values recorded on each date are displayed.         Old Values PFT Results 10/10/22   No data to display.      Pre Drug PFT Results 10/10/22   FVC 57   FEV1 32   FEF 25-75% 12   FEV1/FVC 44      Post Drug PFT Results 10/10/22   No data to display.      Other Tests PFT Results 10/10/22   TLC 89      DLCO 39   D/VAsb 52               Results for orders placed in visit on 10/10/22    Full Pulmonary Function Test Without Bronchodilator    Narrative  Full Pulmonary Function Test Without Bronchodilator  Performed " by: Ana Lilia Dubon, RRT  Authorized by: Korey Vyas MD    Pre Drug % Predicted  FVC: 57%  FEV1: 32%  FEF 25-75%: 12%  FEV1/FVC: 44%  T%  RV: 162%  DLCO: 39%  D/VAsb: 52%    Interpretation  Spirometry  Spirometry shows severe obstruction.  Lung Volume Measurements  Measurements show elevated residual volume consistent with gas trapping.  Diffusion Capacity  The patient's diffusion capacity is severely reduced.  Diffusion capacity is moderately reduced when corrected for alveolar volume.  Overall comments: The patient's spirometry is consistent with a severe bordering on very severe obstructive ventilatory defect.  Lung volumes reveal hyperinflation.  There is also a decrease in vital capacity secondary to obstruction.  A decrease in inspiratory capacity is also present.  There is a severe diffusion impairment which when corrected for alveolar volume is a moderate diffusion impairment.  When current studies are compared to [facility], there has been a decline in the patient's FVC and FEV1 compared to previous.  The degree of hyperinflation is more pronounced compared to previous.  When corrected for alveolar volume there is no significant change in diffusion capacity compared to previous.      Results for orders placed in visit on 20    Pulmonary Function Test    Narrative  Pulmonary Function Test  Performed by: Korey Vyas MD  Authorized by: Korey Vyas MD    Pre Drug  FVC: 62%  FEV1: 35%  FEF 25-75%: 10%  FEV1/FVC: 44.85%  T%  RV: 134%  DLCO: 36%  D/VAsb: 55%      Results for orders placed during the hospital encounter of 19    Full Pulmonary Function Test With Bronchodilator & ABG    Narrative  [facility] - Pulmonary Function Test    [address] [place] [address]  [place]  [address]  [phone]    Patient : iBn Oh  MRN : [id number]  CSN : [id number]  Pulmonologist : Korey  MD Asael  Date : 2/21/2019    ______________________________________________________________________    Interpretation :  1.  Spirometry is consistent with a very severe obstructive ventilatory defect.  2.  There is improvement in spirometry postbronchodilator but a severe obstructive ventilatory defect is still present.  3.  Lung volumes reveal hyperinflation.  There is also a decrease in vital capacity secondary to obstruction and a decrease in inspiratory capacity.  4.  There is a moderate diffusion impairment which when corrected for alveolar volume is a mild diffusion impairment.  5.  Arterial blood gases are within normal limits on room air.      Korey Vyas MD                 My interpretation of imaging:    CT Chest Without Contrast Diagnostic (03/22/2023 15:19)          Assessment and Plan     Diagnoses and all orders for this visit:    1. COPD, severe (HCC) (Primary)  Assessment & Plan:  And his recent PFTs were consistent with a severe obstructive ventilatory defect.  He will continue his Symbicort, his Spiriva, and his as needed albuterol HFA or albuterol neb treatments.        Orders:  -     albuterol sulfate  (90 Base) MCG/ACT inhaler; Inhale 2 puffs Every 4 (Four) Hours As Needed for Wheezing.  Dispense: 18 g; Refill: 11  -     Full Pulmonary Function Test Without Bronchodilator; Future    2. Lung nodules  Assessment & Plan:  These are stable on his recent chest CT and I will repeat this in about 6 months.    Orders:  -     CT Chest Without Contrast Diagnostic; Future    3. Bronchitis  Assessment & Plan:  I did E prescribe a Zithromax Z-PEE.    Orders:  -     azithromycin (ZITHROMAX) 250 MG tablet; Take 2 by mouth today then 1 daily for 4 days  Dispense: 6 tablet; Refill: 0    4. Paroxysmal atrial fibrillation (HCC)  Assessment & Plan:  His rhythm is irregular today.  He is on Xarelto and diltiazem and should continue these agents and follow-up with his other physicians regarding  his atrial fibrillation.      5. Hypoxemia requiring supplemental oxygen  Assessment & Plan:  His sats were in the high 80s to 90% initially when he came into the office.  This may relate to his recent episode of bronchitis.  He can continue his nocturnal oxygen therapy and use it as needed during the day.  Again I encouraged him as the utmost importance of quitting smoking.      6. Cigarette nicotine dependence without complication  Assessment & Plan:  He states he has cut his cigarette consumption back to about 4 to 5 cigarettes/day.  I counseled him as the importance of total smoking cessation for 2 minutes.          Korey Vyas MD  3/28/2023  17:59 CDT    Follow Up   Return in about 6 months (around 10/4/2023) for Complete PFT.    Patient was given instructions and counseling regarding his condition or for health maintenance advice. Please see specific information pulled into the AVS if appropriate.

## 2023-04-06 DIAGNOSIS — J44.9 COPD, SEVERE: ICD-10-CM

## 2023-04-06 NOTE — TELEPHONE ENCOUNTER
Patient told the pharmacy that he was told he could increase his Prednisone to twice a day. The Prescription they have states once a day. He will need a new prescription with new directions for future refills. Patient was given a prescription today with the directions of once a day and will not need it refilled for a few weeks. Please advise.   Rx Refill Note  Requested Prescriptions     Pending Prescriptions Disp Refills   • predniSONE (DELTASONE) 10 MG tablet 90 tablet 3     Sig: Take 1 tablet by mouth Daily. with food      Last office visit with prescribing clinician: 3/28/2023   Last telemedicine visit with prescribing clinician: Visit date not found   Next office visit with prescribing clinician: 10/4/2023                         Would you like a call back once the refill request has been completed: [] Yes [] No    If the office needs to give you a call back, can they leave a voicemail: [] Yes [] No    Vignesh Pacheco CMA  04/06/23, 13:03 CDT

## 2023-04-07 DIAGNOSIS — J44.9 COPD, SEVERE: Primary | ICD-10-CM

## 2023-04-07 RX ORDER — PREDNISONE 10 MG/1
10 TABLET ORAL 2 TIMES DAILY
Qty: 180 TABLET | Refills: 3 | Status: SHIPPED | OUTPATIENT
Start: 2023-04-07

## 2023-04-07 RX ORDER — PREDNISONE 10 MG/1
10 TABLET ORAL DAILY
Qty: 90 TABLET | Refills: 3 | Status: SHIPPED | OUTPATIENT
Start: 2023-04-07

## 2023-04-07 NOTE — TELEPHONE ENCOUNTER
I approve the prescription for the total of 90 pills but also send a new prescription with 180 pills that he can take twice a day so both prescriptions may be in the pharmacy.  If both are already in the pharmacy then tell them they can just fill the prescription for 180 pills for any future refills.  Again I sent in an 480 pills to take 1 pill twice a day with 3 refills.  Thanks.

## 2023-04-19 ENCOUNTER — TELEPHONE (OUTPATIENT)
Dept: PULMONOLOGY | Facility: CLINIC | Age: 63
End: 2023-04-19
Payer: OTHER MISCELLANEOUS

## 2023-04-19 NOTE — TELEPHONE ENCOUNTER
I called patient back and he is going to use the after visit summary that he received from Dr Hansen but he would also like you to send a note indicating that some of the medicines could possibly exacerbate the tremors. I can fax that to the number that he provided.

## 2023-04-19 NOTE — TELEPHONE ENCOUNTER
I did attempt to call the patient but did not receive an answer.  Can you try to call him again and let him know that any medical information regarding the cause of the tremors will need to come from his neurologist Dr. Hansen who had seen him for that recently.  I could potentially send a note indicating that some of his inhaled medications could possibly exacerbate the tremors but I cannot say that they definitely cause his tremors.

## 2023-04-19 NOTE — TELEPHONE ENCOUNTER
Patient called requesting a letter to be sent to Department of [employer] regarding his tremors being caused by medicine.  He said it could be sent to attention to Janelle at [phone].    Please let me or patient know if able to do this or not.

## 2023-04-20 ENCOUNTER — TRANSCRIBE ORDERS (OUTPATIENT)
Dept: ADMINISTRATIVE | Facility: HOSPITAL | Age: 63
End: 2023-04-20
Payer: OTHER MISCELLANEOUS

## 2023-04-20 DIAGNOSIS — I48.91 ATRIAL FIBRILLATION, UNSPECIFIED TYPE: Primary | ICD-10-CM

## 2023-04-27 ENCOUNTER — OFFICE VISIT (OUTPATIENT)
Dept: PULMONOLOGY | Facility: CLINIC | Age: 63
End: 2023-04-27
Payer: OTHER MISCELLANEOUS

## 2023-04-27 DIAGNOSIS — J44.9 COPD, SEVERE: Chronic | ICD-10-CM

## 2023-04-27 NOTE — PROCEDURES
Full Pulmonary Function Test Without Bronchodilator  Performed by: Ana Lilia Dubon, RRT  Authorized by: Korey Vyas MD      Pre Drug % Predicted    FVC: 57%   FEV1: 34%   FEF 25-75%: 16%   FEV1/FVC: 47%   T%   RV: 148%   DLCO: 40%   D/VAsb: 55%

## 2023-05-22 ENCOUNTER — HOSPITAL ENCOUNTER (OUTPATIENT)
Dept: CARDIOLOGY | Facility: HOSPITAL | Age: 63
Discharge: HOME | End: 2023-05-22
Admitting: INTERNAL MEDICINE
Payer: OTHER MISCELLANEOUS

## 2023-05-22 DIAGNOSIS — I48.91 ATRIAL FIBRILLATION, UNSPECIFIED TYPE: ICD-10-CM

## 2023-05-22 LAB
BH CV ECHO MEAS - AO MAX PG: 3.8 MMHG
BH CV ECHO MEAS - AO MEAN PG: 2 MMHG
BH CV ECHO MEAS - AO ROOT DIAM: 3 CM
BH CV ECHO MEAS - AO V2 MAX: 96.9 CM/SEC
BH CV ECHO MEAS - AO V2 VTI: 19 CM
BH CV ECHO MEAS - AVA(I,D): 2.03 CM2
BH CV ECHO MEAS - EDV(CUBED): 103.8 ML
BH CV ECHO MEAS - EDV(MOD-SP2): 72 ML
BH CV ECHO MEAS - EDV(MOD-SP4): 79 ML
BH CV ECHO MEAS - EF(MOD-BP): 67 %
BH CV ECHO MEAS - EF(MOD-SP2): 62.5 %
BH CV ECHO MEAS - EF(MOD-SP4): 65.8 %
BH CV ECHO MEAS - ESV(CUBED): 39.3 ML
BH CV ECHO MEAS - ESV(MOD-SP2): 27 ML
BH CV ECHO MEAS - ESV(MOD-SP4): 27 ML
BH CV ECHO MEAS - FS: 27.7 %
BH CV ECHO MEAS - IVS/LVPW: 1.38 CM
BH CV ECHO MEAS - IVSD: 1.1 CM
BH CV ECHO MEAS - LA DIMENSION: 3.1 CM
BH CV ECHO MEAS - LAT PEAK E' VEL: 10.9 CM/SEC
BH CV ECHO MEAS - LV DIASTOLIC VOL/BSA (35-75): 42.4 CM2
BH CV ECHO MEAS - LV MASS(C)D: 153.4 GRAMS
BH CV ECHO MEAS - LV MAX PG: 1.71 MMHG
BH CV ECHO MEAS - LV MEAN PG: 1 MMHG
BH CV ECHO MEAS - LV SYSTOLIC VOL/BSA (12-30): 14.5 CM2
BH CV ECHO MEAS - LV V1 MAX: 65.3 CM/SEC
BH CV ECHO MEAS - LV V1 VTI: 13.6 CM
BH CV ECHO MEAS - LVIDD: 4.7 CM
BH CV ECHO MEAS - LVIDS: 3.4 CM
BH CV ECHO MEAS - LVOT AREA: 2.8 CM2
BH CV ECHO MEAS - LVOT DIAM: 1.9 CM
BH CV ECHO MEAS - LVPWD: 0.8 CM
BH CV ECHO MEAS - MED PEAK E' VEL: 8.09 CM/SEC
BH CV ECHO MEAS - MR MAX PG: 53.6 MMHG
BH CV ECHO MEAS - MR MAX VEL: 363 CM/SEC
BH CV ECHO MEAS - MR MEAN PG: 44 MMHG
BH CV ECHO MEAS - MR MEAN VEL: 313 CM/SEC
BH CV ECHO MEAS - MR VTI: 131 CM
BH CV ECHO MEAS - MV DEC TIME: 0.16 MSEC
BH CV ECHO MEAS - MV E MAX VEL: 98.7 CM/SEC
BH CV ECHO MEAS - SI(MOD-SP2): 24.2 ML/M2
BH CV ECHO MEAS - SI(MOD-SP4): 27.9 ML/M2
BH CV ECHO MEAS - SV(LVOT): 38.6 ML
BH CV ECHO MEAS - SV(MOD-SP2): 45 ML
BH CV ECHO MEAS - SV(MOD-SP4): 52 ML
BH CV ECHO MEAS - TR MAX PG: 20.8 MMHG
BH CV ECHO MEAS - TR MAX VEL: 228 CM/SEC
BH CV ECHO MEASUREMENTS AVERAGE E/E' RATIO: 10.39
LEFT ATRIUM VOLUME INDEX: 23.7 ML/M2
LEFT ATRIUM VOLUME: 44 ML
LV EF 2D ECHO EST: 65 %

## 2023-05-22 PROCEDURE — 93306 TTE W/DOPPLER COMPLETE: CPT

## 2023-05-22 PROCEDURE — 25510000001 PERFLUTREN 6.52 MG/ML SUSPENSION: Performed by: INTERNAL MEDICINE

## 2023-05-22 RX ADMIN — PERFLUTREN 1.17 MG: 6.52 INJECTION, SUSPENSION INTRAVENOUS at 07:53

## 2023-06-14 ENCOUNTER — TELEPHONE (OUTPATIENT)
Dept: PULMONOLOGY | Facility: CLINIC | Age: 63
End: 2023-06-14
Payer: MEDICARE

## 2023-06-14 NOTE — TELEPHONE ENCOUNTER
I did speak to Lexis in case management regarding my previous note regarding Mr. Oh's issues with tremors.  She asked if I could addend the note regarding etiology of his tremors and I stated to her that any questions regarding the actual etiology of the tremors need to be deferred to his neurologist and I could not addend the letter.

## 2023-09-06 ENCOUNTER — TELEPHONE (OUTPATIENT)
Dept: PULMONOLOGY | Facility: CLINIC | Age: 63
End: 2023-09-06
Payer: MEDICARE

## 2023-09-06 NOTE — TELEPHONE ENCOUNTER
Caller: Adriano Bin MCCABE    Relationship: Self    Best call back number: [phone]    What form or medical record are you requesting: MEDICAL RECORDS AND VERIFICATION WHEN PATIENT WAS DECLARED AS DISABLED TO WORK    Who is requesting this form or medical record from you: PATIENT AND DEPARTMENT OF LABOR    How would you like to receive the form or medical records (pick-up, mail, fax):      If pick-up, provide patient with address and location details    Timeframe paperwork needed: ASAP    Additional notes: PATIENT STATES DEPART OF LABOR NEEDS PROOF OF WHEN DR. ZARAGOZA TOLD HIM HE WAS DISABLED TO WORK. HE WOULD LIKE TO KNOW HOW TO GO ABOUT GETTING HIS RECORDS RELEASED TO HIM. PLEASE CALL PATIENT. THANK YOU

## 2023-09-19 DIAGNOSIS — J44.9 COPD, SEVERE: ICD-10-CM

## 2023-09-19 RX ORDER — ALBUTEROL SULFATE 90 UG/1
2 AEROSOL, METERED RESPIRATORY (INHALATION) EVERY 4 HOURS
Qty: 54 G | Refills: 5 | Status: SHIPPED | OUTPATIENT
Start: 2023-09-19

## 2023-09-19 NOTE — TELEPHONE ENCOUNTER
Patient called to request refills on Albuterol HFA. Refill passed protocol and sent to the pharmacy.  Rx Refill Note  Requested Prescriptions     Pending Prescriptions Disp Refills    albuterol sulfate  (90 Base) MCG/ACT inhaler 54 g 5     Sig: Inhale 2 puffs Every 4 (Four) Hours.      Last office visit with prescribing clinician: 3/28/2023   Last telemedicine visit with prescribing clinician: Visit date not found   Next office visit with prescribing clinician: 10/11/2023                         Would you like a call back once the refill request has been completed: [] Yes [] No    If the office needs to give you a call back, can they leave a voicemail: [] Yes [] No    Vignesh Pacheco, MORENA  09/19/23, 15:31 CDT

## 2023-09-19 NOTE — TELEPHONE ENCOUNTER
Caller: Bin Oh ELIOT    Relationship: Self    Best call back number: [phone]    Requested Prescriptions:   albuterol sulfate  (90 Base) MCG/ACT inhaler  2 puff, Every 4 Hours            albuterol sulfate  (90 Base) MCG/ACT inhaler  2 puff, Every 4 Hours PRN            Pharmacy where request should be sent:  [address] - [phone]  - [phone]  [phone]     Last office visit with prescribing clinician: 3/28/2023   Last telemedicine visit with prescribing clinician: Visit date not found   Next office visit with prescribing clinician: 10/11/2023     Additional details provided by patient: PATIENT IS USING HIS INHALER MORE THAN HE WOULD LIKE TO.     Does the patient have less than a 3 day supply:  [x] Yes  [] No    Would you like a call back once the refill request has been completed: [] Yes [] No    If the office needs to give you a call back, can they leave a voicemail: [] Yes [] No    Deepti Avelar Rep   09/19/23 15:20 CDT

## 2023-10-04 ENCOUNTER — HOSPITAL ENCOUNTER (OUTPATIENT)
Dept: CT IMAGING | Facility: HOSPITAL | Age: 63
Discharge: HOME | End: 2023-10-04
Admitting: INTERNAL MEDICINE
Payer: OTHER MISCELLANEOUS

## 2023-10-04 ENCOUNTER — TELEPHONE (OUTPATIENT)
Dept: PULMONOLOGY | Facility: CLINIC | Age: 63
End: 2023-10-04

## 2023-10-04 DIAGNOSIS — R91.8 LUNG NODULES: Chronic | ICD-10-CM

## 2023-10-04 PROCEDURE — 71250 CT THORAX DX C-: CPT

## 2023-10-04 NOTE — TELEPHONE ENCOUNTER
Dr. Vyas reviewed the patient's CT of Chest. Per Dr. Vyas please let the patient know per report his CT was stable with no new or suspicious lesion. I will review the actual scan itself when I return.     Left voicemail with patient to call back for results.

## 2023-10-11 ENCOUNTER — OFFICE VISIT (OUTPATIENT)
Dept: PULMONOLOGY | Facility: CLINIC | Age: 63
End: 2023-10-11
Payer: OTHER MISCELLANEOUS

## 2023-10-11 VITALS
HEIGHT: 70 IN | HEART RATE: 106 BPM | SYSTOLIC BLOOD PRESSURE: 100 MMHG | BODY MASS INDEX: 22.05 KG/M2 | OXYGEN SATURATION: 94 % | DIASTOLIC BLOOD PRESSURE: 60 MMHG | WEIGHT: 154 LBS

## 2023-10-11 DIAGNOSIS — G47.34 NOCTURNAL HYPOXEMIA: Chronic | ICD-10-CM

## 2023-10-11 DIAGNOSIS — R91.8 LUNG NODULES: Chronic | ICD-10-CM

## 2023-10-11 DIAGNOSIS — J44.9 COPD, SEVERE: Primary | ICD-10-CM

## 2023-10-11 DIAGNOSIS — J44.9 COPD, SEVERE: Primary | Chronic | ICD-10-CM

## 2023-10-11 DIAGNOSIS — F17.210 CIGARETTE NICOTINE DEPENDENCE WITHOUT COMPLICATION: Chronic | ICD-10-CM

## 2023-10-11 DIAGNOSIS — J43.2 CENTRILOBULAR EMPHYSEMA: Chronic | ICD-10-CM

## 2023-10-11 RX ORDER — BENZONATATE 200 MG/1
200 CAPSULE ORAL 2 TIMES DAILY PRN
COMMUNITY

## 2023-10-11 RX ORDER — PRIMIDONE 50 MG/1
50 TABLET ORAL NIGHTLY
COMMUNITY

## 2023-10-11 NOTE — PROGRESS NOTES
Chief Complaint  COPD and Lung nodules    Subjective    History of Present Illness {CC  Problem List  Visit Diagnosis   Encounters  Notes  Medications  Labs  Result Review Imaging  Media: 23}    Bin Oh presents to [facility] for COPD and lung nodules.    History of Present Illness   The patient had his chest CT last week and we have called him with the results indicating stability of his 7 mm nodule in particular.  I reviewed the scan with him today and demonstrated the size of the nodule to him in the office lung nodule model.  I will get a follow-up scan in 6 months if that shows continued stability we could probably go back to once yearly scans.  He did have PFTs today and his spirometry actually shows some improvement in his FVC and no change in his FEV1 compared to studies from April of this year.  The degree of hyperinflation was minimally worse but not significantly so.  When corrected for alveolar volume there was a decline in diffusion capacity compared to previous.  I do think the decline in diffusion capacity relates to his persistent tobacco use although he is cutting back and is going to try to quit and I counseled regarding smoking cessation for 2 minutes.  He has cut back on his neb treatments and his tremors did improve dramatically.  He has had to use his rescue inhaler a bit more frequently I told him as long as he is not having significant side effects he could use it up to every 4 hours.  He also has stopped his steroids as he had some issues with peripheral edema.  That has improved but he still has some swelling of the left calf and I told him to follow-up with his primary care physician on this.  I told him we will see him back in about 6 months with a follow-up chest CT.  He has had the Pneumovax in the past.  He has declined the flu shot.  Prior to Admission medications    Medication Sig Start Date End Date Taking?  Authorizing Provider   albuterol (PROVENTIL) (2.5 MG/3ML) 0.083% nebulizer solution Take 2.5 mg by nebulization Every 4 (Four) Hours As Needed for Wheezing. 3/18/23  Yes Korey Vyas MD   albuterol sulfate  (90 Base) MCG/ACT inhaler Inhale 2 puffs Every 4 (Four) Hours As Needed for Wheezing. 3/28/23  Yes Korey Vyas MD   albuterol sulfate  (90 Base) MCG/ACT inhaler Inhale 2 puffs Every 4 (Four) Hours. 9/19/23  Yes Korey Vyas MD   benzonatate (TESSALON) 200 MG capsule Take 1 capsule by mouth 2 (Two) Times a Day As Needed.   Yes Sanaz Bunch MD   budesonide-formoterol (SYMBICORT) 160-4.5 MCG/ACT inhaler Inhale 2 puffs 2 (Two) Times a Day. Rinse and spit after using. 2/18/20  Yes Korey Vyas MD   diazePAM (VALIUM) 10 MG tablet As Needed. 12/18/19  Yes Sanaz Bunch MD   dilTIAZem HCl Coated Beads (CARDIZEM CD PO) Take 240 mg by mouth.   Yes ProviderSanaz MD   O2 (OXYGEN) Inhale 2 L/min At Night As Needed.   Yes ProviderSanaz MD   predniSONE (DELTASONE) 10 MG tablet Take 1 tablet by mouth Daily. with food 4/7/23  Yes Korey Vyas MD   primidone (MYSOLINE) 50 MG tablet Take 1 tablet by mouth Every Night.   Yes Sanaz Bunch MD   rivaroxaban (XARELTO) 20 MG tablet Take 1 tablet by mouth Daily.   Yes Sanaz Bunch MD   sildenafil (VIAGRA) 100 MG tablet Take 1 tablet by mouth Daily As Needed for Erectile Dysfunction.   Yes Sanaz Bunch MD   tiotropium bromide monohydrate (SPIRIVA RESPIMAT) 2.5 MCG/ACT aerosol solution inhaler Inhale 2 puffs Daily.   Yes Sanaz Bunch MD   predniSONE (DELTASONE) 10 MG tablet Take 1 tablet by mouth 2 (Two) Times a Day.  Patient not taking: Reported on 10/11/2023 4/7/23   Korey Vyas MD   tiotropium bromide monohydrate (Spiriva Respimat) 2.5 MCG/ACT aerosol solution inhaler Inhale 2 puffs Daily.  Patient not taking: Reported on 3/28/2023 9/26/22    "Korey Vyas MD   azithromycin (ZITHROMAX) 250 MG tablet Take 2 by mouth today then 1 daily for 4 days 3/28/23 10/11/23  Korey Vyas MD       Social History     Socioeconomic History    Marital status:    Tobacco Use    Smoking status: Every Day     Packs/day: 0.25     Years: 40.00     Additional pack years: 0.00     Total pack years: 10.00     Types: Cigarettes     Start date: 1/1/1976     Passive exposure: Current    Smokeless tobacco: Never    Tobacco comments:     He currently is smoking 4 to 5 cigarettes/day but in the past has smoked up to a pack of cigarettes per day and has well over 30-pack-year history of smoking.   Vaping Use    Vaping Use: Never used   Substance and Sexual Activity    Alcohol use: No    Drug use: No    Sexual activity: Yes     Partners: Female       Objective   Vital Signs:   /60   Pulse 106   Ht 177.8 cm (70\")   Wt 69.9 kg (154 lb)   SpO2 94% Comment: RA  BMI 22.10 kg/mý     Physical Exam  Vitals and nursing note reviewed.   Constitutional:       Comments: He was mildly tachycardic initially with a pulse of 106.  After resting in the exam room for a few minutes his pulse was down to 90.   HENT:      Head: Normocephalic.   Eyes:      Extraocular Movements: Extraocular movements intact.      Pupils: Pupils are equal, round, and reactive to light.   Cardiovascular:      Rate and Rhythm: Regular rhythm. Tachycardia present.      Comments: Again he was mildly tachycardic initially with a pulse of 106 but after resting in the exam room for a few minutes his pulse was down to 90.  Pulmonary:      Effort: Pulmonary effort is normal.      Comments: Breath sounds are diminished throughout but no adventitious sounds are heard.  Musculoskeletal:         General: Normal range of motion.      Left lower leg: Edema present.   Skin:     General: Skin is warm and dry.   Neurological:      General: No focal deficit present.      Mental Status: He is alert and " oriented to person, place, and time.   Psychiatric:         Mood and Affect: Mood normal.         Behavior: Behavior normal.        Result Review :    PFT Values          10/10/2022    09:00 2023    11:00 10/11/2023    15:00   Pre Drug PFT Results   FVC 57 57 60   FEV1 32 34 34   FEF 25-75% 12 16 14   FEV1/FVC 44 47 44   Other Tests PFT Results   TLC 89 87 89    148 152   DLCO 39 40 31   D/VAsb 52 55 46         Results for orders placed in visit on 10/11/23    Spirometry with Diffusion Capacity & Lung Volumes    Narrative  Spirometry with Diffusion Capacity & Lung Volumes    Performed by: Ana Lilia Dubon, RRT  Authorized by: Korey Vyas MD  Pre Drug % Predicted  FVC: 60%  FEV1: 34%  FEF 25-75%: 14%  FEV1/FVC: 44%  T%  RV: 152%  DLCO: 31%  D/VAsb: 46%    Interpretation  Spirometry  Spirometry shows severe obstruction.  Lung Volume Measurements  Measurements show elevated residual volume consistent with gas trapping.  Diffusion Capacity  The patient's diffusion capacity is severely reduced.  Diffusion capacity is moderately reduced when corrected for alveolar volume.  Overall comments: The patient's spirometry is consistent with a severe obstructive ventilatory defect.  Lung volumes reveal hyperinflation and there is also a decrease in vital capacity secondary obstruction as well as a decrease in inspiratory capacity.  There is a severe diffusion impairment which when corrected for alveolar volume is a moderate diffusion impairment.  When current studies are compared to studies performed on 2023, there has been slight improvement in the patient's FVC and no change in his FEV1 compared to previous.  The degree of hyperinflation has worsened minimally but not significantly.  When corrected for alveolar volume there has been a decline in diffusion capacity compared to previous.      Results for orders placed in visit on 23    Full Pulmonary Function Test Without  Bronchodilator    Narrative  Full Pulmonary Function Test Without Bronchodilator  Performed by: Ana Lilia Dubon, RRT  Authorized by: Korey Vyas MD    Pre Drug % Predicted  FVC: 57%  FEV1: 34%  FEF 25-75%: 16%  FEV1/FVC: 47%  T%  RV: 148%  DLCO: 40%  D/VAsb: 55%    Interpretation  Spirometry  Spirometry shows severe obstruction.  Lung Volume Measurements  Measurements show elevated residual volume consistent with gas trapping.  Diffusion Capacity  The patient's diffusion capacity is moderately reduced.  Diffusion capacity is moderately reduced when corrected for alveolar volume.  Overall comments: The patient's spirometry is consistent with a severe obstructive ventilatory defect.  Lung volumes reveal a decrease in vital capacity secondary to obstruction and hyperinflation is also present.  There is also a decrease in inspiratory capacity.  There is a moderate bordering on severe diffusion impairment which when corrected for alveolar volume is still a moderate diffusion impairment.  When current studies are compared to studies performed on October 10, 2022, there has been no significant change in spirometry compared to previous.  There has been slight improvement in the degree of hyperinflation compared to previous.  When corrected for alveolar volume there has been slight improvement in diffusion capacity compared to previous.      Results for orders placed in visit on 10/10/22    Full Pulmonary Function Test Without Bronchodilator    Narrative  Full Pulmonary Function Test Without Bronchodilator  Performed by: Ana Lilia Dubon, RRT  Authorized by: Korey Vyas MD    Pre Drug % Predicted  FVC: 57%  FEV1: 32%  FEF 25-75%: 12%  FEV1/FVC: 44%  T%  RV: 162%  DLCO: 39%  D/VAsb: 52%    Interpretation  Spirometry  Spirometry shows severe obstruction.  Lung Volume Measurements  Measurements show elevated residual volume consistent with gas trapping.  Diffusion Capacity  The  patient's diffusion capacity is severely reduced.  Diffusion capacity is moderately reduced when corrected for alveolar volume.  Overall comments: The patient's spirometry is consistent with a severe bordering on very severe obstructive ventilatory defect.  Lung volumes reveal hyperinflation.  There is also a decrease in vital capacity secondary to obstruction.  A decrease in inspiratory capacity is also present.  There is a severe diffusion impairment which when corrected for alveolar volume is a moderate diffusion impairment.  When current studies are compared to [facility], there has been a decline in the patient's FVC and FEV1 compared to previous.  The degree of hyperinflation is more pronounced compared to previous.  When corrected for alveolar volume there is no significant change in diffusion capacity compared to previous.                 My interpretation of imaging:    CT Chest Without Contrast Diagnostic (10/04/2023 08:50)         Assessment and Plan {CC Problem List  Visit Diagnosis  ROS  Review (Popup)  Health Maintenance  Quality  BestPractice  Medications  SmartSets  SnapShot Encounters  Media : 23}    Diagnoses and all orders for this visit:    1. COPD, severe (Primary)  Assessment & Plan:  PFTs today are again consistent with a severe obstructive ventilatory defect.  He will continue his Novacort, his albuterol HFA or albuterol neb treatments as needed, and he also has Spiriva to use if need be for increased symptoms.        Orders:  -     Spirometry with Diffusion Capacity & Lung Volumes    2. Lung nodules  Assessment & Plan:  His lung nodules are stable including the largest 7 mm nodule.  I will repeat the scan in 6 months.      3. Centrilobular emphysema  Assessment & Plan:  This has been noted on his prior CAT scans and is associated with his COPD.        4. Nocturnal hypoxemia  Assessment & Plan:  He will continue his  nocturnal oxygen therapy.      5. Cigarette nicotine dependence without complication  Assessment & Plan:  He is down to about 4 cigarettes/day and is going to try to quit.  I counseled him regarding smoking cessation for 2 minutes.            Korey Vyas MD  10/11/2023  16:17 CDT    Follow Up   Return in about 6 months (around 4/11/2024) for To see me specifically.    Patient was given instructions and counseling regarding his condition or for health maintenance advice. Please see specific information pulled into the AVS if appropriate.

## 2023-10-11 NOTE — ASSESSMENT & PLAN NOTE
He is down to about 4 cigarettes/day and is going to try to quit.  I counseled him regarding smoking cessation for 2 minutes.

## 2023-10-11 NOTE — ASSESSMENT & PLAN NOTE
PFTs today are again consistent with a severe obstructive ventilatory defect.  He will continue his Novacort, his albuterol HFA or albuterol neb treatments as needed, and he also has Spiriva to use if need be for increased symptoms.

## 2023-10-11 NOTE — ASSESSMENT & PLAN NOTE
His lung nodules are stable including the largest 7 mm nodule.  I will repeat the scan in 6 months.

## 2023-10-11 NOTE — PROCEDURES
Spirometry with Diffusion Capacity & Lung Volumes    Performed by: Ana Lilia Dubon, RRT  Authorized by: Korey Vyas MD     Pre Drug % Predicted    FVC: 60%   FEV1: 34%   FEF 25-75%: 14%   FEV1/FVC: 44%   T%   RV: 152%   DLCO: 31%   D/VAsb: 46%    Interpretation   Spirometry   Spirometry shows severe obstruction.   Lung Volume Measurements  Measurements show elevated residual volume consistent with gas trapping.   Diffusion Capacity  The patient's diffusion capacity is severely reduced.  Diffusion capacity is moderately reduced when corrected for alveolar volume.   Overall comments: The patient's spirometry is consistent with a severe obstructive ventilatory defect.  Lung volumes reveal hyperinflation and there is also a decrease in vital capacity secondary obstruction as well as a decrease in inspiratory capacity.  There is a severe diffusion impairment which when corrected for alveolar volume is a moderate diffusion impairment.  When current studies are compared to studies performed on 2023, there has been slight improvement in the patient's FVC and no change in his FEV1 compared to previous.  The degree of hyperinflation has worsened minimally but not significantly.  When corrected for alveolar volume there has been a decline in diffusion capacity compared to previous.

## 2023-10-11 NOTE — PATIENT INSTRUCTIONS
The patient has cut back significantly on his cigarette consumption and seems to be doing much better from a pulmonary standpoint.  PFTs today reveal stability of his FEV1 but improvement in his FVC compared to studies performed in April of this year.  His recent chest CT was stable but I will plan on a repeat scan in 6 months to follow his 7 mm right lung nodule in particular.  He has been able to cut back on his neb treatments and his tremors have improved significantly.  He has used his rescue inhaler a bit more frequently but is not having any major side effects such as tremors with such therapy.  He is having issues with left lower extremity edema and will follow-up with Dr. Fontaine on that but he also has been able to stop his oral steroids I told him that may help prevent problems with fluid retention.  Again for acute exacerbations of his COPD certainly may resume steroids in tapering fashion and can also use his neb treatments more frequently but at present he is off steroids and has not having to use his neb treatments as frequently as before and that has resulted in marked improvement in his tremor.

## 2023-10-18 ENCOUNTER — TELEPHONE (OUTPATIENT)
Dept: PULMONOLOGY | Facility: CLINIC | Age: 63
End: 2023-10-18
Payer: OTHER MISCELLANEOUS

## 2024-03-12 DIAGNOSIS — J44.9 COPD, SEVERE: ICD-10-CM

## 2024-03-12 RX ORDER — ALBUTEROL SULFATE 90 UG/1
2 AEROSOL, METERED RESPIRATORY (INHALATION) EVERY 4 HOURS
Qty: 25.5 G | Refills: 11 | Status: SHIPPED | OUTPATIENT
Start: 2024-03-12

## 2024-03-12 NOTE — TELEPHONE ENCOUNTER
Rx Refill Note  Requested Prescriptions     Pending Prescriptions Disp Refills    albuterol sulfate  (90 Base) MCG/ACT inhaler [Pharmacy Med Name: ALBUTEROL SULFATE HFA HFA AEROSOL SOLN] 25.5 g 11     Sig: INHALE 2 PUFFS EVERY 4 (FOUR) HOURS.      Last office visit with prescribing clinician: 10/11/2023   Last telemedicine visit with prescribing clinician: Visit date not found   Next office visit with prescribing clinician: 4/17/2024                         Would you like a call back once the refill request has been completed: [] Yes [] No    If the office needs to give you a call back, can they leave a voicemail: [] Yes [] No    Ana Lilia Bobo MA  03/12/24, 09:22 CDT

## 2024-04-15 ENCOUNTER — HOSPITAL ENCOUNTER (OUTPATIENT)
Dept: CT IMAGING | Facility: HOSPITAL | Age: 64
Discharge: HOME | End: 2024-04-15
Admitting: INTERNAL MEDICINE
Payer: OTHER MISCELLANEOUS

## 2024-04-15 DIAGNOSIS — R91.8 LUNG NODULES: Chronic | ICD-10-CM

## 2024-04-15 PROCEDURE — 71250 CT THORAX DX C-: CPT

## 2024-04-17 ENCOUNTER — OFFICE VISIT (OUTPATIENT)
Dept: PULMONOLOGY | Facility: CLINIC | Age: 64
End: 2024-04-17
Payer: OTHER MISCELLANEOUS

## 2024-04-17 VITALS
HEART RATE: 105 BPM | OXYGEN SATURATION: 98 % | HEIGHT: 70 IN | BODY MASS INDEX: 21.82 KG/M2 | DIASTOLIC BLOOD PRESSURE: 70 MMHG | SYSTOLIC BLOOD PRESSURE: 122 MMHG | WEIGHT: 152.4 LBS

## 2024-04-17 DIAGNOSIS — F17.210 CIGARETTE NICOTINE DEPENDENCE WITHOUT COMPLICATION: Chronic | ICD-10-CM

## 2024-04-17 DIAGNOSIS — J44.9 COPD, SEVERE: Primary | Chronic | ICD-10-CM

## 2024-04-17 DIAGNOSIS — G47.34 NOCTURNAL HYPOXEMIA: Chronic | ICD-10-CM

## 2024-04-17 DIAGNOSIS — J43.2 CENTRILOBULAR EMPHYSEMA: Chronic | ICD-10-CM

## 2024-04-17 DIAGNOSIS — R91.8 LUNG NODULES: Chronic | ICD-10-CM

## 2024-04-17 RX ORDER — AZITHROMYCIN 250 MG/1
250 TABLET, FILM COATED ORAL DAILY
COMMUNITY

## 2024-04-17 NOTE — ASSESSMENT & PLAN NOTE
He will continue his Symbicort, his Spiriva, and as needed albuterol HFA or albuterol neb treatments.  I will repeat his pulmonary functions on his return visit.

## 2024-04-17 NOTE — PATIENT INSTRUCTIONS
The patient has had a bit more difficulties with shortness of breath recently.  He has been using his neb treatments generally once a day in the morning at home he can use those more frequently.  I strongly encouraged him as to the importance of smoking cessation he is going to work on this.  He is also had some issues with peripheral edema and he had been followed by Dr. Tr Martin from a cardiology standpoint in the past and I encouraged him to become established with one of the other cardiologist as Dr. Martin has retired.  I did discuss with him the [place] valve procedure and advised him that if he was going to consider that he would definitely to be off cigarettes and we will need to get PFTs with the body box at the hospital before that could even be considered and he wants to hold off on any such evaluation at this time.  I will see him back in 6 months with pulmonary functions here in the office.

## 2024-04-17 NOTE — PROGRESS NOTES
Chief Complaint  COPD and Lung nodules    Subjective    History of Present Illness {CC  Problem List  Visit Diagnosis   Encounters  Notes  Medications  Labs  Result Review Imaging  Media: 23}    Bin Oh presents to [facility] for COPD and lung nodules    History of Present Illness   The patient states he has been a bit more short of breath recently and has been using his neb treatments routinely in the morning.  I told him he could utilize those up to 4 times a day if need be.  He unfortunate continues to smoke but is going to try to work on smoking cessation.  He inquired about any other therapy that might benefit him from the standpoint of his COPD and I told he could be assessed regarding [place] valve placement but he clearly would be a candidate for that if he was still smoking and we will have to get complete PFTs with a body box and he states he does not want to proceed with any workup in this regard as apparently a friend of his did pass away related to such surgery.  He does try to stay fairly active.  Pulm rehab would be an option but again only feels off cigarettes and is going to work on quitting.  His recent CT showed stability of the small nodules and I reviewed the scan with him I told we will just see him back in about 6 months.  He has had a Pneumovax and a Prevnar vaccine in the past.  He has declined the flu shot previously.  Prior to Admission medications    Medication Sig Start Date End Date Taking? Authorizing Provider   albuterol (PROVENTIL) (2.5 MG/3ML) 0.083% nebulizer solution Take 2.5 mg by nebulization Every 4 (Four) Hours As Needed for Wheezing. 3/18/23  Yes Korey Vyas MD   albuterol sulfate  (90 Base) MCG/ACT inhaler Inhale 2 puffs Every 4 (Four) Hours As Needed for Wheezing. 3/28/23  Yes Korey Vyas MD   albuterol sulfate  (90 Base) MCG/ACT inhaler Inhale 2 puffs Every 4 (Four) Hours.  9/19/23  Yes Korey Vyas MD   albuterol sulfate  (90 Base) MCG/ACT inhaler INHALE 2 PUFFS EVERY 4 (FOUR) HOURS. 3/12/24  Yes Korey Vyas MD   azithromycin (ZITHROMAX) 250 MG tablet Take 1 tablet by mouth Daily. 5 days   Yes Sanaz Bunch MD   benzonatate (TESSALON) 200 MG capsule Take 1 capsule by mouth 2 (Two) Times a Day As Needed.   Yes Sanaz Bunch MD   budesonide-formoterol (SYMBICORT) 160-4.5 MCG/ACT inhaler Inhale 2 puffs 2 (Two) Times a Day. Rinse and spit after using. 2/18/20  Yes Korey Vyas MD   diazePAM (VALIUM) 10 MG tablet As Needed. 12/18/19  Yes Sanaz Bunch MD   dilTIAZem HCl Coated Beads (CARDIZEM CD PO) Take 240 mg by mouth.   Yes Sanaz Bunch MD   O2 (OXYGEN) Inhale 2 L/min At Night As Needed.   Yes Sanaz Bunch MD   predniSONE (DELTASONE) 10 MG tablet Take 1 tablet by mouth 2 (Two) Times a Day. 4/7/23  Yes Korey Vyas MD   rivaroxaban (XARELTO) 20 MG tablet Take 1 tablet by mouth Daily.   Yes Sanaz Bunch MD   sildenafil (VIAGRA) 100 MG tablet Take 1 tablet by mouth Daily As Needed for Erectile Dysfunction.   Yes Sanaz Bunch MD   tiotropium bromide monohydrate (SPIRIVA RESPIMAT) 2.5 MCG/ACT aerosol solution inhaler Inhale 2 puffs Daily.   Yes Sanaz Bunch MD   tiotropium bromide monohydrate (Spiriva Respimat) 2.5 MCG/ACT aerosol solution inhaler Inhale 2 puffs Daily. 9/26/22  Yes Korey Vyas MD   predniSONE (DELTASONE) 10 MG tablet Take 1 tablet by mouth Daily. with food  Patient not taking: Reported on 4/17/2024 4/7/23   Korey Vyas MD   primidone (MYSOLINE) 50 MG tablet Take 1 tablet by mouth Every Night.  Patient not taking: Reported on 4/17/2024    Sanaz Bunch MD       Social History     Socioeconomic History    Marital status:    Tobacco Use    Smoking status: Every Day     Current packs/day: 0.25     Average packs/day: 0.3  "packs/day for 48.3 years (12.1 ttl pk-yrs)     Types: Cigarettes     Start date: 1/1/1976     Passive exposure: Current    Smokeless tobacco: Never    Tobacco comments:     He currently is smoking 4 to 5 cigarettes/day but in the past has smoked up to a pack of cigarettes per day and has well over 30-pack-year history of smoking.   Vaping Use    Vaping status: Never Used   Substance and Sexual Activity    Alcohol use: No    Drug use: No    Sexual activity: Yes     Partners: Female       Objective   Vital Signs:   /70   Pulse 105   Ht 177.8 cm (70\")   Wt 69.1 kg (152 lb 6.4 oz)   SpO2 98% Comment: RA  BMI 21.87 kg/m²     Physical Exam  Vitals and nursing note reviewed.   Constitutional:       Comments: He is mildly tachycardic with a pulse of 105.   HENT:      Head: Normocephalic.   Eyes:      Extraocular Movements: Extraocular movements intact.      Pupils: Pupils are equal, round, and reactive to light.   Cardiovascular:      Rate and Rhythm: Regular rhythm. Tachycardia present.      Comments: Again he was mildly tachycardic with a pulse of 105.  Pulmonary:      Effort: Pulmonary effort is normal.      Comments: Breath sounds are diminished but no adventitious sounds are heard.  Musculoskeletal:         General: Normal range of motion.      Right lower leg: Edema present.      Left lower leg: Edema present.      Comments: He has 1+ edema of the calves.   Skin:     General: Skin is warm and dry.   Neurological:      General: No focal deficit present.      Mental Status: He is alert and oriented to person, place, and time.   Psychiatric:         Mood and Affect: Mood normal.         Behavior: Behavior normal.        Result Review :    PFT Values          10/10/2022    09:00 4/27/2023    11:00 10/11/2023    15:00   Pre Drug PFT Results   FVC 57 57 60   FEV1 32 34 34   FEF 25-75% 12 16 14   FEV1/FVC 44 47 44   Other Tests PFT Results   TLC 89 87 89    148 152   DLCO 39 40 31   D/VAsb 52 55 46 "         Results for orders placed in visit on 10/11/23    Spirometry with Diffusion Capacity & Lung Volumes    Narrative  Spirometry with Diffusion Capacity & Lung Volumes    Performed by: Ana Lilia Dubon, RRT  Authorized by: Korey Vyas MD  Pre Drug % Predicted  FVC: 60%  FEV1: 34%  FEF 25-75%: 14%  FEV1/FVC: 44%  T%  RV: 152%  DLCO: 31%  D/VAsb: 46%    Interpretation  Spirometry  Spirometry shows severe obstruction.  Lung Volume Measurements  Measurements show elevated residual volume consistent with gas trapping.  Diffusion Capacity  The patient's diffusion capacity is severely reduced.  Diffusion capacity is moderately reduced when corrected for alveolar volume.  Overall comments: The patient's spirometry is consistent with a severe obstructive ventilatory defect.  Lung volumes reveal hyperinflation and there is also a decrease in vital capacity secondary obstruction as well as a decrease in inspiratory capacity.  There is a severe diffusion impairment which when corrected for alveolar volume is a moderate diffusion impairment.  When current studies are compared to studies performed on 2023, there has been slight improvement in the patient's FVC and no change in his FEV1 compared to previous.  The degree of hyperinflation has worsened minimally but not significantly.  When corrected for alveolar volume there has been a decline in diffusion capacity compared to previous.      Results for orders placed in visit on 23    Full Pulmonary Function Test Without Bronchodilator    Narrative  Full Pulmonary Function Test Without Bronchodilator  Performed by: Ana Lilia Dubon, RRT  Authorized by: Korey Vyas MD    Pre Drug % Predicted  FVC: 57%  FEV1: 34%  FEF 25-75%: 16%  FEV1/FVC: 47%  T%  RV: 148%  DLCO: 40%  D/VAsb: 55%    Interpretation  Spirometry  Spirometry shows severe obstruction.  Lung Volume Measurements  Measurements show elevated residual volume  consistent with gas trapping.  Diffusion Capacity  The patient's diffusion capacity is moderately reduced.  Diffusion capacity is moderately reduced when corrected for alveolar volume.  Overall comments: The patient's spirometry is consistent with a severe obstructive ventilatory defect.  Lung volumes reveal a decrease in vital capacity secondary to obstruction and hyperinflation is also present.  There is also a decrease in inspiratory capacity.  There is a moderate bordering on severe diffusion impairment which when corrected for alveolar volume is still a moderate diffusion impairment.  When current studies are compared to studies performed on October 10, 2022, there has been no significant change in spirometry compared to previous.  There has been slight improvement in the degree of hyperinflation compared to previous.  When corrected for alveolar volume there has been slight improvement in diffusion capacity compared to previous.      Results for orders placed in visit on 10/10/22    Full Pulmonary Function Test Without Bronchodilator    Narrative  Full Pulmonary Function Test Without Bronchodilator  Performed by: Ana Lilia Dubon, RRT  Authorized by: Korey Vyas MD    Pre Drug % Predicted  FVC: 57%  FEV1: 32%  FEF 25-75%: 12%  FEV1/FVC: 44%  T%  RV: 162%  DLCO: 39%  D/VAsb: 52%    Interpretation  Spirometry  Spirometry shows severe obstruction.  Lung Volume Measurements  Measurements show elevated residual volume consistent with gas trapping.  Diffusion Capacity  The patient's diffusion capacity is severely reduced.  Diffusion capacity is moderately reduced when corrected for alveolar volume.  Overall comments: The patient's spirometry is consistent with a severe bordering on very severe obstructive ventilatory defect.  Lung volumes reveal hyperinflation.  There is also a decrease in vital capacity secondary to obstruction.  A decrease in inspiratory capacity is also present.  There is a  severe diffusion impairment which when corrected for alveolar volume is a moderate diffusion impairment.  When current studies are compared to [facility], there has been a decline in the patient's FVC and FEV1 compared to previous.  The degree of hyperinflation is more pronounced compared to previous.  When corrected for alveolar volume there is no significant change in diffusion capacity compared to previous.                 My interpretation of imaging:    CT Chest Without Contrast Diagnostic (04/15/2024 08:40)         Assessment and Plan {CC Problem List  Visit Diagnosis  ROS  Review (Popup)  [facility] Maintenance  Quality  BestPractice  Medications  SmartSets  SnapShot Encounters  Media : 23}    Diagnoses and all orders for this visit:    1. COPD, severe (Primary)  Assessment & Plan:  He will continue his Symbicort, his Spiriva, and as needed albuterol HFA or albuterol neb treatments.  I will repeat his pulmonary functions on his return visit.      Orders:  -     Spirometry with Diffusion Capacity & Lung Volumes; Future    2. Lung nodules  Assessment & Plan:  His CT was stable and I will repeat his scan in 1 year.  He was noted to have some atherosclerotic changes in the thoracic aorta and coronary arteries with mild cardiomegaly also present.  He has seen Dr. Tr Martin in the past from a cardiac standpoint and I advised him to become established with another cardiologist as Dr. Martin retired recently.      3. Centrilobular emphysema  Assessment & Plan:  This has been noted on his prior CAT scans and is associated with his COPD.  He has had a normal alpha-1 evaluation in the past.  If he was even going to be considered for [place] valve placement which she definitely is not interested in at present he would need to be off cigarettes and have complete PFTs with a body box and he does not want to have any such PFTs performed in that regard  in terms of the body box.      4. Nocturnal hypoxemia  Assessment & Plan:  He will continue his nocturnal oxygen therapy and he can use it during the day as needed.      5. Cigarette nicotine dependence without complication  Assessment & Plan:  He was counseled regarding smoking cessation for 2 minutes.  He does state he knows he needs to quit.                                                  Korey Vyas MD  4/17/2024  13:38 CDT    Follow Up   Return in about 6 months (around 10/17/2024) for To see me specifically, Complete PFT.    Patient was given instructions and counseling regarding his condition or for health maintenance advice. Please see specific information pulled into the AVS if appropriate.

## 2024-04-17 NOTE — ASSESSMENT & PLAN NOTE
This has been noted on his prior CAT scans and is associated with his COPD.  He has had a normal alpha-1 evaluation in the past.  If he was even going to be considered for [place] valve placement which she definitely is not interested in at present he would need to be off cigarettes and have complete PFTs with a body box and he does not want to have any such PFTs performed in that regard in terms of the body box.

## 2024-04-17 NOTE — ASSESSMENT & PLAN NOTE
He was counseled regarding smoking cessation for 2 minutes.  He does state he knows he needs to quit.

## 2024-04-17 NOTE — ASSESSMENT & PLAN NOTE
His CT was stable and I will repeat his scan in 1 year.  He was noted to have some atherosclerotic changes in the thoracic aorta and coronary arteries with mild cardiomegaly also present.  He has seen Dr. Tr Martin in the past from a cardiac standpoint and I advised him to become established with another cardiologist as Dr. Martin retired recently.

## 2024-08-30 ENCOUNTER — TELEPHONE (OUTPATIENT)
Dept: PULMONOLOGY | Facility: CLINIC | Age: 64
End: 2024-08-30
Payer: OTHER MISCELLANEOUS

## 2024-08-30 NOTE — TELEPHONE ENCOUNTER
Patient would like to know if we can increase any of his medications, he takes 3-4 steps and is out of breath, has to take rescue inhaler and more breathing treatments.Patient is swelling also and legs have been leaking, he states that he fixing to have to go into a nursing home since he isn't able to take care of himself. He would like to be seen sooner then October so I transferred the call for the patient to get a sooner appointment with Dr. Vyas, patient stated he doesn't want to see any one else but Dr. Vyas.

## 2024-08-30 NOTE — TELEPHONE ENCOUNTER
Patient called and left voicemail stating he had questions about his medication, I tried to return patient's call, unable to reach but did leave a voicemail to call the office back about the questions and we could go over them.

## 2024-09-02 NOTE — TELEPHONE ENCOUNTER
I did call the patient on my cell phone on Friday, August 30.  He is scheduled to see me early next week.  I will go over possible adjustments in his current treatment with him at that time.  He did state his peripheral edema was better after using some support hose but we may need to perform some cardiac studies and again I will discuss that with him in more detail when he comes in to see me next week.

## 2024-09-09 ENCOUNTER — OFFICE VISIT (OUTPATIENT)
Dept: PULMONOLOGY | Facility: CLINIC | Age: 64
End: 2024-09-09
Payer: OTHER MISCELLANEOUS

## 2024-09-09 VITALS
HEIGHT: 69 IN | HEART RATE: 98 BPM | OXYGEN SATURATION: 95 % | SYSTOLIC BLOOD PRESSURE: 126 MMHG | BODY MASS INDEX: 20.59 KG/M2 | DIASTOLIC BLOOD PRESSURE: 76 MMHG | WEIGHT: 139 LBS

## 2024-09-09 DIAGNOSIS — J44.9 COPD, SEVERE: Chronic | ICD-10-CM

## 2024-09-09 DIAGNOSIS — R91.8 LUNG NODULES: Chronic | ICD-10-CM

## 2024-09-09 DIAGNOSIS — F17.210 CIGARETTE NICOTINE DEPENDENCE WITHOUT COMPLICATION: Chronic | ICD-10-CM

## 2024-09-09 DIAGNOSIS — R06.02 SHORTNESS OF BREATH: Primary | Chronic | ICD-10-CM

## 2024-09-09 DIAGNOSIS — G47.34 NOCTURNAL HYPOXEMIA: Chronic | ICD-10-CM

## 2024-09-09 DIAGNOSIS — J43.2 CENTRILOBULAR EMPHYSEMA: Chronic | ICD-10-CM

## 2024-09-09 PROCEDURE — 94727 GAS DIL/WSHOT DETER LNG VOL: CPT | Performed by: INTERNAL MEDICINE

## 2024-09-09 PROCEDURE — 99214 OFFICE O/P EST MOD 30 MIN: CPT | Performed by: INTERNAL MEDICINE

## 2024-09-09 PROCEDURE — 94010 BREATHING CAPACITY TEST: CPT | Performed by: INTERNAL MEDICINE

## 2024-09-09 PROCEDURE — 94729 DIFFUSING CAPACITY: CPT | Performed by: INTERNAL MEDICINE

## 2024-09-09 NOTE — ASSESSMENT & PLAN NOTE
He is having uses rescue therapy particular his albuterol HFA inhaler more frequently.  I did tell him if he uses it greater than the recommended dosage schedule he is almost running and have issues with tremulousness and possibly tachycardia and he is already having some problems with being bit shaky.  Can hopefully if he can quit smoking he will not require the use of his rescue therapy as frequently as he is on triple therapy for maintenance purposes already.  Again he will continue his Symbicort, Spiriva, and as needed albuterol HFA or albuterol neb treatments.

## 2024-09-09 NOTE — ASSESSMENT & PLAN NOTE
I did advise him to follow-up with Dr. Wren regarding national cardiac studies as he could have a cardiac component to his shortness of breath particularly as he may be developing some pulmonary hypertension associated with his chronic lung disease.  If it is related to underlying COPD there is no specific treatment for this in terms of pharmacologic therapy and again the therapy is treating his COPD and maintaining adequate oxygen levels.  However he could also have some left heart abnormalities and again he will follow-up with Dr. Fontaine on this.

## 2024-09-09 NOTE — PROCEDURES
Spirometry with Diffusion Capacity & Lung Volumes    Performed by: Vignesh Pacheco CMA  Authorized by: Korey Vyas MD     Pre Drug % Predicted    FVC: 54%   FEV1: 31%   FEF 25-75%: 11%   FEV1/FVC: 45.27%   T%   RV: 128%   DLCO: 44%   D/VAsb: 56%    Interpretation   Spirometry   Spirometry shows severe.   Lung Volume Measurements  Measurements show elevated residual volume consistent with gas trapping.   Diffusion Capacity  The patient's diffusion capacity is moderately reduced.  Diffusion capacity is moderately reduced when corrected for alveolar volume.   Overall comments: The patient's spirometry is consistent with a severe bordering on very severe obstructive ventilatory defect.  Lung volumes reveal a decrease in vital capacity secondary to obstruction along with hyperinflation.  There is a moderate bordering on severe diffusion impairment which when corrected for alveolar volume is still a moderate diffusion impairment.  When current studies are compared to studies performed on 2023, there has been a decline in the patient's FVC and FEV1 compared to previous.  The degree of hyperinflation has improved somewhat compared to previous.  When corrected for alveolar volume there has been some improvement in diffusion capacity compared to previous.

## 2024-09-09 NOTE — PATIENT INSTRUCTIONS
The patient has had progressive problems with shortness of breath with exertion over the past several weeks.  Pulmonary function today do show a slight decline from previous.  I do suspect his issues relate to progression of his COPD and he is going to work on smoking cessation and will continue his current regimen otherwise.  I will see him back in about 2 months.  He also has had some issues with peripheral edema and will follow-up with Dr. Fontaine regarding some additional workup to include a possible follow-up 2D echo as a previous echo did show diastolic dysfunction and some dilation of the right ventricle along with some decrease in right ventricular function.

## 2024-09-09 NOTE — PROGRESS NOTES
Chief Complaint  COPD and Increased shortness of breath    Subjective    History of Present Illness {CC  Problem List  Visit Diagnosis   Encounters  Notes  Medications  Labs  Result Review Imaging  Media: 23}    Bin Oh presents to [facility] for COPD and increased shortness of breath.    History of Present Illness   The patient has been under a lot of stress states he may be getting a divorce soon and is going to be checked into [facility] by Dr. Fontaine his primary care physician in the near future.  He realizes he will have to totally discontinue smoking before entering [facility].  He has had increasing issues with shortness of breath with fairly minimal exertion recently.  He did have some productive cough and it was productive of just clear to white phlegm and now his cough is nonproductive.  He is also had issues with peripheral edema and this is better when he wears compression devices such as compression hose or boots but he did not wear those this morning he does have 1+ to 2+ edema of the lower calves.  A prior 2D echo had shown diastolic dysfunction with some dilatation of the right ventricle and decreased RV function.  He may be developing some component of pulmonary hypertension associated with his chronic lung disease.  The treatment is obviously his continue his inhaled medications which she is having to use more frequently in terms of his rescue therapy, total discontinuation of smoking, and he may continue his oxygen therapy at night and as needed during the day.  Again I told the most important factor and hopefully stabilizing or improving his symptoms long-term his smoking cessation.  Pulmonary function today do show some decline from previous and he would be considered to have a severe bordering on very severe obstructive ventilatory defect and I reviewed the studies with him.  He has had the Prevnar 13 and the Pneumovax in the past.   He has declined the flu shot previously.  Prior to Admission medications    Medication Sig Start Date End Date Taking? Authorizing Provider   albuterol (PROVENTIL) (2.5 MG/3ML) 0.083% nebulizer solution Take 2.5 mg by nebulization Every 4 (Four) Hours As Needed for Wheezing. 3/18/23  Yes Korey Vyas MD   albuterol sulfate  (90 Base) MCG/ACT inhaler Inhale 2 puffs Every 4 (Four) Hours As Needed for Wheezing. 3/28/23  Yes Korey Vyas MD   benzonatate (TESSALON) 200 MG capsule Take 1 capsule by mouth 2 (Two) Times a Day As Needed.   Yes Sanaz Bunch MD   budesonide-formoterol (SYMBICORT) 160-4.5 MCG/ACT inhaler Inhale 2 puffs 2 (Two) Times a Day. Rinse and spit after using. 2/18/20  Yes Korey Vyas MD   diazePAM (VALIUM) 10 MG tablet As Needed. 12/18/19  Yes Sanaz Bunch MD   dilTIAZem HCl Coated Beads (CARDIZEM CD PO) Take 240 mg by mouth.   Yes Sanaz Bunch MD   O2 (OXYGEN) Inhale 2 L/min At Night As Needed.   Yes Sanaz Bunch MD   rivaroxaban (XARELTO) 20 MG tablet Take 1 tablet by mouth Daily.   Yes Sanaz Bunch MD   sildenafil (VIAGRA) 100 MG tablet Take 1 tablet by mouth Daily As Needed for Erectile Dysfunction.   Yes Sanaz Bunch MD   tiotropium bromide monohydrate (SPIRIVA RESPIMAT) 2.5 MCG/ACT aerosol solution inhaler Inhale 2 puffs Daily.   Yes Sanaz Bunch MD   albuterol sulfate  (90 Base) MCG/ACT inhaler Inhale 2 puffs Every 4 (Four) Hours.  Patient not taking: Reported on 9/9/2024 9/19/23   Korey Vyas MD   albuterol sulfate  (90 Base) MCG/ACT inhaler INHALE 2 PUFFS EVERY 4 (FOUR) HOURS.  Patient not taking: Reported on 9/9/2024 3/12/24   Korey Vyas MD   azithromycin (ZITHROMAX) 250 MG tablet Take 1 tablet by mouth Daily. 5 days  Patient not taking: Reported on 9/9/2024    Provider, MD Sanaz   predniSONE (DELTASONE) 10 MG tablet Take 1 tablet by mouth Daily. with  "food  Patient not taking: Reported on 4/17/2024 4/7/23   Korey Vyas MD   predniSONE (DELTASONE) 10 MG tablet Take 1 tablet by mouth 2 (Two) Times a Day.  Patient not taking: Reported on 9/9/2024 4/7/23   Korey Vyas MD   primidone (MYSOLINE) 50 MG tablet Take 1 tablet by mouth Every Night.  Patient not taking: Reported on 4/17/2024    Provider, MD Sanaz   tiotropium bromide monohydrate (Spiriva Respimat) 2.5 MCG/ACT aerosol solution inhaler Inhale 2 puffs Daily.  Patient not taking: Reported on 9/9/2024 9/26/22   Korey Vyas MD       Social History     Socioeconomic History    Marital status:    Tobacco Use    Smoking status: Every Day     Current packs/day: 0.25     Average packs/day: 0.3 packs/day for 48.7 years (12.2 ttl pk-yrs)     Types: Cigarettes     Start date: 1/1/1976     Passive exposure: Current    Smokeless tobacco: Never    Tobacco comments:     He currently is smoking 4 to 5 cigarettes/day but in the past has smoked up to a pack of cigarettes per day and has well over 30-pack-year history of smoking.   Vaping Use    Vaping status: Never Used   Substance and Sexual Activity    Alcohol use: No    Drug use: No    Sexual activity: Yes     Partners: Female       Objective   Vital Signs:   /76 (BP Location: Left arm, Patient Position: Sitting, Cuff Size: Adult) Comment (Cuff Size): Manual  Pulse 98   Ht 175.3 cm (69\")   Wt 63 kg (139 lb)   SpO2 95% Comment: RA  BMI 20.53 kg/m²     Physical Exam  Vitals and nursing note reviewed.   HENT:      Head: Normocephalic.   Eyes:      Extraocular Movements: Extraocular movements intact.      Pupils: Pupils are equal, round, and reactive to light.   Cardiovascular:      Rate and Rhythm: Normal rate and regular rhythm.   Pulmonary:      Effort: Pulmonary effort is normal.      Comments: Breath sounds are diminished throughout but no adventitious sounds are heard.  Musculoskeletal:         General: Normal range " of motion.      Right lower leg: Edema present.      Left lower leg: Edema present.      Comments: He has 1+ to 2+ edema of the lower calves and ankles.   Skin:     General: Skin is warm and dry.   Neurological:      General: No focal deficit present.      Mental Status: He is alert and oriented to person, place, and time.   Psychiatric:         Mood and Affect: Mood normal.         Behavior: Behavior normal.        Result Review :    PFT Values          2023    11:00 10/11/2023    15:00 2024    09:00   Pre Drug PFT Results   FVC 57 60 54   FEV1 34 34 31   FEF 25-75% 16 14 11   FEV1/FVC 47 44 45.27   Other Tests PFT Results   TLC 87 89 89    152 128   DLCO 40 31 44   D/VAsb 55 46 56         Results for orders placed in visit on 24    Spirometry with Diffusion Capacity & Lung Volumes    Narrative  Spirometry with Diffusion Capacity & Lung Volumes    Performed by: Vignesh Pacheco CMA  Authorized by: Korey Vyas MD  Pre Drug % Predicted  FVC: 54%  FEV1: 31%  FEF 25-75%: 11%  FEV1/FVC: 45.27%  T%  RV: 128%  DLCO: 44%  D/VAsb: 56%    Interpretation  Spirometry  Spirometry shows severe.  Lung Volume Measurements  Measurements show elevated residual volume consistent with gas trapping.  Diffusion Capacity  The patient's diffusion capacity is moderately reduced.  Diffusion capacity is moderately reduced when corrected for alveolar volume.  Overall comments: The patient's spirometry is consistent with a severe bordering on very severe obstructive ventilatory defect.  Lung volumes reveal a decrease in vital capacity secondary to obstruction along with hyperinflation.  There is a moderate bordering on severe diffusion impairment which when corrected for alveolar volume is still a moderate diffusion impairment.  When current studies are compared to studies performed on 2023, there has been a decline in the patient's FVC and FEV1 compared to previous.  The degree of hyperinflation  has improved somewhat compared to previous.  When corrected for alveolar volume there has been some improvement in diffusion capacity compared to previous.      Results for orders placed in visit on 10/11/23    Spirometry with Diffusion Capacity & Lung Volumes    Narrative  Spirometry with Diffusion Capacity & Lung Volumes    Performed by: Ana Lilia Dubon RRT  Authorized by: Korey Vyas MD  Pre Drug % Predicted  FVC: 60%  FEV1: 34%  FEF 25-75%: 14%  FEV1/FVC: 44%  T%  RV: 152%  DLCO: 31%  D/VAsb: 46%    Interpretation  Spirometry  Spirometry shows severe obstruction.  Lung Volume Measurements  Measurements show elevated residual volume consistent with gas trapping.  Diffusion Capacity  The patient's diffusion capacity is severely reduced.  Diffusion capacity is moderately reduced when corrected for alveolar volume.  Overall comments: The patient's spirometry is consistent with a severe obstructive ventilatory defect.  Lung volumes reveal hyperinflation and there is also a decrease in vital capacity secondary obstruction as well as a decrease in inspiratory capacity.  There is a severe diffusion impairment which when corrected for alveolar volume is a moderate diffusion impairment.  When current studies are compared to studies performed on 2023, there has been slight improvement in the patient's FVC and no change in his FEV1 compared to previous.  The degree of hyperinflation has worsened minimally but not significantly.  When corrected for alveolar volume there has been a decline in diffusion capacity compared to previous.      Results for orders placed in visit on 23    Full Pulmonary Function Test Without Bronchodilator    Narrative  Full Pulmonary Function Test Without Bronchodilator  Performed by: Ana Lilia Dubon, RRT  Authorized by: Korey Vyas MD    Pre Drug % Predicted  FVC: 57%  FEV1: 34%  FEF 25-75%: 16%  FEV1/FVC: 47%  T%  RV: 148%  DLCO:  40%  D/VAsb: 55%    Interpretation  Spirometry  Spirometry shows severe obstruction.  Lung Volume Measurements  Measurements show elevated residual volume consistent with gas trapping.  Diffusion Capacity  The patient's diffusion capacity is moderately reduced.  Diffusion capacity is moderately reduced when corrected for alveolar volume.  Overall comments: The patient's spirometry is consistent with a severe obstructive ventilatory defect.  Lung volumes reveal a decrease in vital capacity secondary to obstruction and hyperinflation is also present.  There is also a decrease in inspiratory capacity.  There is a moderate bordering on severe diffusion impairment which when corrected for alveolar volume is still a moderate diffusion impairment.  When current studies are compared to studies performed on October 10, 2022, there has been no significant change in spirometry compared to previous.  There has been slight improvement in the degree of hyperinflation compared to previous.  When corrected for alveolar volume there has been slight improvement in diffusion capacity compared to previous.                 My interpretation of cardiac studies:  Adult Transthoracic Echo Complete W/ Cont if Necessary Per Protocol (05/22/2023 09:48)         Assessment and Plan {CC Problem List  Visit Diagnosis  ROS  Review (Popup)  Health Maintenance  Quality  BestPractice  Medications  SmartSets  SnapShot Encounters  Media : 23}    Diagnoses and all orders for this visit:    1. Shortness of breath (Primary)  Assessment & Plan:  I did advise him to follow-up with Dr. Wren regarding national cardiac studies as he could have a cardiac component to his shortness of breath particularly as he may be developing some pulmonary hypertension associated with his chronic lung disease.  If it is related to underlying COPD there is no specific treatment for this in terms of pharmacologic therapy and again the therapy is treating his COPD  and maintaining adequate oxygen levels.  However he could also have some left heart abnormalities and again he will follow-up with Dr. Fontaine on this.      2. COPD, severe  Assessment & Plan:  He is having uses rescue therapy particular his albuterol HFA inhaler more frequently.  I did tell him if he uses it greater than the recommended dosage schedule he is almost running and have issues with tremulousness and possibly tachycardia and he is already having some problems with being bit shaky.  Can hopefully if he can quit smoking he will not require the use of his rescue therapy as frequently as he is on triple therapy for maintenance purposes already.  Again he will continue his Symbicort, Spiriva, and as needed albuterol HFA or albuterol neb treatments.        3. Centrilobular emphysema  Assessment & Plan:  This has been noted on prior imaging studies.        4. Nocturnal hypoxemia  Assessment & Plan:  He will continue his nocturnal oxygen therapy can also use as needed during the day.      5. Lung nodules  Assessment & Plan:  No new or suspicious nodules were noted on his last CT.      6. Cigarette nicotine dependence without complication  Assessment & Plan:  He was counseled regarding smoking cessation for 2 minutes.                                                  Korey Vyas MD  9/9/2024  09:51 CDT    Follow Up   Return in about 8 weeks (around 11/4/2024).    Patient was given instructions and counseling regarding his condition or for health maintenance advice. Please see specific information pulled into the AVS if appropriate.

## 2024-09-16 DIAGNOSIS — J44.9 COPD, SEVERE: Chronic | ICD-10-CM

## 2024-09-16 RX ORDER — ALBUTEROL SULFATE 90 UG/1
2 AEROSOL, METERED RESPIRATORY (INHALATION)
Qty: 18 G | Refills: 0 | Status: SHIPPED | OUTPATIENT
Start: 2024-09-16

## 2024-09-16 RX ORDER — ALBUTEROL SULFATE 90 UG/1
2 AEROSOL, METERED RESPIRATORY (INHALATION) EVERY 4 HOURS PRN
Qty: 18 G | Refills: 0 | Status: SHIPPED | OUTPATIENT
Start: 2024-09-16

## 2024-09-18 ENCOUNTER — TELEPHONE (OUTPATIENT)
Dept: PULMONOLOGY | Facility: CLINIC | Age: 64
End: 2024-09-18
Payer: OTHER MISCELLANEOUS

## 2024-09-18 DIAGNOSIS — J44.9 COPD, SEVERE: ICD-10-CM

## 2024-09-18 RX ORDER — ALBUTEROL SULFATE 90 UG/1
2 AEROSOL, METERED RESPIRATORY (INHALATION) EVERY 4 HOURS
Qty: 108 G | Refills: 3 | Status: SHIPPED | OUTPATIENT
Start: 2024-09-18

## 2024-09-24 ENCOUNTER — TELEPHONE (OUTPATIENT)
Dept: PULMONOLOGY | Facility: CLINIC | Age: 64
End: 2024-09-24
Payer: OTHER MISCELLANEOUS

## 2024-09-25 ENCOUNTER — TELEPHONE (OUTPATIENT)
Dept: PULMONOLOGY | Facility: CLINIC | Age: 64
End: 2024-09-25
Payer: OTHER MISCELLANEOUS

## 2024-09-30 ENCOUNTER — TELEPHONE (OUTPATIENT)
Dept: PULMONOLOGY | Facility: CLINIC | Age: 64
End: 2024-09-30
Payer: OTHER MISCELLANEOUS

## 2024-09-30 NOTE — TELEPHONE ENCOUNTER
"Voicemail from Ana stating she would like to have patient's medical records, I did see that she is on [facility] Verbal Release from 2019 but the patient and individual are currently going through a divorce, I spoke with my  and was advised to call the patient and inform him of what has taken place. I spoke with patient and he said \"DO NOT give her my medical record,\" he will be here on Thursday to have a new verbal release filled out and her removed as an emergency contact. I did call Ana back and inform her that I am not allowed to give her this information. Be aware she is calling and asking the patient's records, when his appointments  are, what medication he is on, and if his conditions are getting worse.  "

## 2024-10-02 ENCOUNTER — TELEPHONE (OUTPATIENT)
Dept: PULMONOLOGY | Facility: CLINIC | Age: 64
End: 2024-10-02
Payer: OTHER MISCELLANEOUS

## 2024-10-02 DIAGNOSIS — J44.9 COPD, SEVERE: Chronic | ICD-10-CM

## 2024-10-02 RX ORDER — ALBUTEROL SULFATE 90 UG/1
2 INHALANT RESPIRATORY (INHALATION) TAKE AS DIRECTED
Qty: 108 G | Refills: 3 | Status: SHIPPED | OUTPATIENT
Start: 2024-10-02

## 2024-10-02 RX ORDER — ALBUTEROL SULFATE 90 UG/1
2 INHALANT RESPIRATORY (INHALATION) TAKE AS DIRECTED
Qty: 108 G | Refills: 3 | Status: SHIPPED | OUTPATIENT
Start: 2024-10-02 | End: 2024-10-02 | Stop reason: SDUPTHER

## 2024-10-02 NOTE — TELEPHONE ENCOUNTER
I was able to reach the patient by phone and did advise him that I have sent in a new prescription specifying he can take 2 puffs up to every 3-4 hours but even if he does take the albuterol up to every 3-4 hours particularly while awake the maximum number of puffs will still be 12 puffs/day.  Hopefully that will help him get his 6 inhalers every 3 months.  I told him to address any other questions regarding when he is eligible to have the prescription refilled to his pharmacy.  He expressed understanding.

## 2024-10-02 NOTE — TELEPHONE ENCOUNTER
Caller: Bin Oh    Relationship to patient: Self    Best call back number:     [phone] (Home)       Patient is needing: PT IS WANTING TO SPEAK TO MA ABOUT SYMPTOMS THAT HE'S HAVING

## 2024-10-02 NOTE — TELEPHONE ENCOUNTER
Patient still has not been able to get 6 inhalers. He is asking for a prescription that says 2 puffs every 3-4 hours be sent to [facility].  Patient said he is having to do 4-5 breathing treatments since he hasn't been able to do 12 puffs a day on the inhaler.    Please advise.

## 2024-10-14 PROBLEM — L02.31 CUTANEOUS ABSCESS OF BUTTOCK: Status: ACTIVE | Noted: 2024-10-14

## 2024-10-14 NOTE — PROGRESS NOTES
[facility] History and Physical  Bin Oh  MRN: [id number]  Age: 63 y.o. male   YOB: 1960        Primary   Problem      R perianal abscess    SUBJECTIVE  History  of Presenting Illness   Patient is a 63 y.o. male with pertinent hx of severe COPD w worsening dyspnea with minimal exertion per his pulmonologist, nocturnal hypoxemia on nightime O2, afib on home rivaroxaban, presenting for evaluation of R perianal abscess. He was placed on abx and has had great improvement but has had remaining abscess cavity.       He otherwise denies lightheadedness, dizziness, fainting, passing out, headache, nausea, vomiting, chest pain, palpitations, shortness of breath, coughing, wheezing, heart burn, reflux, skin lesions, unintended weight loss/gain, sensitivity to heat/cold, changes in sensation, changes in vision/hearing/smell/taste, myalgias, arthralgias, and has no other acute complaints or concerning symptoms to report at this time.    Past Medical History     Past Medical History:  Past Medical History:   Diagnosis Date    Arthritis     Atrial fibrillation     COPD (chronic obstructive pulmonary disease)     Essential tremor     Lung nodule 2014    Pneumonia 2014    Pulmonary arterial hypertension 2021       PCP: Harry Fontaine MD    Past Surgical History:  Past Surgical History:   Procedure Laterality Date    FINGER SURGERY      FOOT SURGERY         Family History:  Family History   Problem Relation Age of Onset    Heart disease Mother     Heart disease Father     No Known Problems Sister     No Known Problems Brother     No Known Problems Maternal Aunt     No Known Problems Maternal Uncle     No Known Problems Paternal Aunt     No Known Problems Paternal Uncle     No Known Problems Maternal Grandmother     No Known Problems Maternal Grandfather     No Known Problems Paternal Grandmother     No Known Problems Paternal Grandfather     Asthma Neg Hx     Cancer Neg Hx      Diabetes Neg Hx     Emphysema Neg Hx     Heart failure Neg Hx     Hypertension Neg Hx        Social History:  Social History     Tobacco Use    Smoking status: Every Day     Current packs/day: 0.25     Average packs/day: 0.3 packs/day for 48.8 years (12.2 ttl pk-yrs)     Types: Cigarettes     Start date: 1/1/1976     Passive exposure: Current    Smokeless tobacco: Never    Tobacco comments:     He currently is smoking 4 to 5 cigarettes/day but in the past has smoked up to a pack of cigarettes per day and has well over 30-pack-year history of smoking.   Substance Use Topics    Alcohol use: No       Allergies:  Allergies   Allergen Reactions    Penicillins Other (See Comments)     Unknown         Medications:    Current Outpatient Medications:     albuterol (PROVENTIL) (2.5 MG/3ML) 0.083% nebulizer solution, Take 2.5 mg by nebulization Every 4 (Four) Hours As Needed for Wheezing., Disp: 360 mL, Rfl: 11    albuterol sulfate  (90 Base) MCG/ACT inhaler, INHALE 2 PUFFS EVERY 4 (FOUR) HOURS., Disp: 25.5 g, Rfl: 11    albuterol sulfate  (90 Base) MCG/ACT inhaler, Inhale 2 puffs Every 4 (Four) to 6 (Six) Hours As Needed for Wheezing or Shortness of Air., Disp: 18 g, Rfl: 0    albuterol sulfate  (90 Base) MCG/ACT inhaler, Inhale 2 puffs Every 4 (Four) Hours., Disp: 108 g, Rfl: 3    albuterol sulfate  (90 Base) MCG/ACT inhaler, Inhale 2 puffs Take As Directed. Take 2 puffs every 3-4 hours as needed while awake to a maximum of 12 puffs/day., Disp: 108 g, Rfl: 3    benzonatate (TESSALON) 200 MG capsule, Take 1 capsule by mouth 2 (Two) Times a Day As Needed., Disp: , Rfl:     budesonide-formoterol (SYMBICORT) 160-4.5 MCG/ACT inhaler, Inhale 2 puffs 2 (Two) Times a Day. Rinse and spit after using., Disp: 2 inhaler, Rfl: 0    dilTIAZem HCl Coated Beads (CARDIZEM CD PO), Take 240 mg by mouth., Disp: , Rfl:     O2 (OXYGEN), Inhale 3 L/min At Night As Needed., Disp: , Rfl:     predniSONE (DELTASONE) 10 MG  "tablet, Take 1 tablet by mouth Daily. with food, Disp: 90 tablet, Rfl: 3    predniSONE (DELTASONE) 10 MG tablet, Take 1 tablet by mouth 2 (Two) Times a Day., Disp: 180 tablet, Rfl: 3    primidone (MYSOLINE) 50 MG tablet, Take 1 tablet by mouth Every Night., Disp: , Rfl:     rivaroxaban (XARELTO) 20 MG tablet, Take 1 tablet by mouth Daily., Disp: , Rfl:     sildenafil (VIAGRA) 100 MG tablet, Take 1 tablet by mouth Daily As Needed for Erectile Dysfunction., Disp: , Rfl:     tiotropium bromide monohydrate (SPIRIVA RESPIMAT) 2.5 MCG/ACT aerosol solution inhaler, Inhale 2 puffs Daily., Disp: , Rfl:     tiotropium bromide monohydrate (Spiriva Respimat) 2.5 MCG/ACT aerosol solution inhaler, Inhale 2 puffs Daily., Disp: 2 each, Rfl: 0    azithromycin (ZITHROMAX) 250 MG tablet, Take 1 tablet by mouth Daily. 5 days (Patient not taking: Reported on 10/15/2024), Disp: , Rfl:     diazePAM (VALIUM) 10 MG tablet, As Needed., Disp: , Rfl:         Review of Systems   Per HPI.     Physical Examination     Vitals:    10/15/24 0805   BP: 134/74   BP Location: Left arm   Patient Position: Sitting   Cuff Size: Adult   Pulse: 88   SpO2: (!) 84%   Weight: 62.2 kg (137 lb 3.2 oz)   Height: 175.3 cm (69.02\")       Estimated body mass index is 20.25 kg/m² as calculated from the following:    Height as of this encounter: 175.3 cm (69.02\").    Weight as of this encounter: 62.2 kg (137 lb 3.2 oz).  PREVIOUS WEIGHTS:   Wt Readings from Last 5 Encounters:   10/15/24 62.2 kg (137 lb 3.2 oz)   09/09/24 63 kg (139 lb)   04/17/24 69.1 kg (152 lb 6.4 oz)   10/11/23 69.9 kg (154 lb)   03/28/23 69.4 kg (153 lb)       Physical Examination:  Gen: awake, alert, sitting up in chair in no acute distress  HEENT: NCAT, EOMI, anicteric sclerae  CV: RRR, normotensive  Resp: nonlabored on room air, sats appropriate  Abd: soft, nontender, nondistended   : ~2cm x 2cm area of induration, ertythema at R perianal region -  firm and slightly fluctuant  MSK: moves all " four, no c/c/e  Neuro: no focal deficits, cranial nerves grossly intact  Psy:  appropriate, cooperative      Data Review     Labs:  CBC  Lab Results   Component Value Date    WBC 5.92 07/27/2019    HGB 14.8 07/27/2019    HCT 44.6 07/27/2019     07/27/2019      BMP  Lab Results   Component Value Date     07/27/2019    K 4.5 07/27/2019     07/27/2019    CO2 29.0 07/27/2019    BUN 22 (H) 07/27/2019    CREATININE 0.65 07/27/2019    GLUCOSE 79 07/27/2019    CALCIUM 9.4 07/27/2019      LFTs  Lab Results   Component Value Date    PROTEINTOT 7.0 07/27/2019    ALBUMIN 4.10 07/27/2019    BILITOT 0.7 07/27/2019    ALT 20 07/27/2019    AST 24 07/27/2019    ALKPHOS 82 07/27/2019      Coag  Lab Results   Component Value Date    PROTIME 17.1 (H) 07/27/2019    PTT 38.2 (H) 07/27/2019    INR 1.35 (H) 07/27/2019          Problem List     Patient Active Problem List   Diagnosis    Shortness of breath    Nocturnal hypoxemia    COPD, severe    Scarring of lung    Cigarette nicotine dependence without complication    Lung nodules    Hypoxemia requiring supplemental oxygen    COPD with acute exacerbation    Paroxysmal atrial fibrillation    Overweight    Bronchitis    Centrilobular emphysema    Cutaneous abscess of buttock    Perianal abscess        Assessment/Plan   Bin Oh is a 63 y.o. male with multiple comorbid conditions, presenting for evaluation of a right buttocks abscess. He has held his xarelto for the last three days. We performed an incision and drainage of the abscess in clinic today and placed an iodoform guaze to facilitate drainage.       Smoking cessation counseling: counseled regarding smoking cessation  BMI counseling: BMI is within normal parameters. No other follow-up for BMI required.  Med rec complete: yes  VTE: Not indicated    Time Spent: I spent 30 minutes caring for Bin Oh on this date of service. This time includes time, independent of any procedures, spent by me in the following  activities: preparing for the clinic visit, reviewing tests, obtaining and/or reviewing a separately obtained history, performing a medically appropriate examination and/or evaluation, counseling and educating the patient/family/caregiver, ordering medications, tests, or procedures, and documenting information in the medical record.     Doug George MD   10/15/2024 08:58 CDT

## 2024-10-15 ENCOUNTER — PATIENT ROUNDING (BHMG ONLY) (OUTPATIENT)
Dept: SURGERY | Facility: CLINIC | Age: 64
End: 2024-10-15
Payer: MEDICARE

## 2024-10-15 ENCOUNTER — OFFICE VISIT (OUTPATIENT)
Dept: SURGERY | Facility: CLINIC | Age: 64
End: 2024-10-15
Payer: MEDICARE

## 2024-10-15 VITALS
OXYGEN SATURATION: 84 % | HEART RATE: 88 BPM | DIASTOLIC BLOOD PRESSURE: 74 MMHG | HEIGHT: 69 IN | WEIGHT: 137.2 LBS | SYSTOLIC BLOOD PRESSURE: 134 MMHG | BODY MASS INDEX: 20.32 KG/M2

## 2024-10-15 DIAGNOSIS — K61.0 PERIANAL ABSCESS: Primary | ICD-10-CM

## 2024-10-15 NOTE — LETTER
October 15, 2024     PARRIS Maza  [facility] [facility] [address]    Patient: Bin Oh   YOB: 1960   Date of Visit: 10/15/2024     Dear PARRIS Maza:     I have seen Bin Oh in my clinic today for his perianal abscess. This was incised and drained in clinic today, and he will remain off his anticoagulation for a couple more days afterward. Given the proximity to the anorectum and the chronicity of this abscess, I am concerned about a possible anal fistula, and we will investigate this with anoscopy when he follows up with me in 2 weeks.     Should you have any questions or concerns about his care, please feel free to reach out. Thank you very much for this referral and for involving me in Bin's care.        Sincerely,        Doug George MD        CC: MD Ariel Martínez Phillip, MD  10/15/24 0900  Sign when Signing Visit  Procedure  Incision & Drainage    Date/Time: 10/15/2024 8:59 AM    Performed by: Doug George MD  Authorized by: Doug George MD  Type: abscess  Body area: anogenital  Location details: perianal  Anesthesia: local infiltration    Anesthesia:  Local Anesthetic: lidocaine 1% with epinephrine    Sedation:  Patient sedated: no    Risk factor: coagulopathy  Scalpel size: 15  Needle gauge: 22  Incision type: single straight  Complexity: simple  Drainage: purulent and bloody  Drainage amount: moderate  Wound treatment: wound left open  Packing material: 1/2 in gauze  Patient tolerance: patient tolerated the procedure well with no immediate complications          Doug George MD  10/15/24  09:00 CDT         Doug George MD  10/15/24 0858  Sign when Signing Visit    [facility] History and Physical  Bin Oh  MRN: [id number]  Age: 63 y.o. male   YOB: 1960        Primary   Problem      R perianal abscess    SUBJECTIVE  History  of Presenting Illness    Patient is a 63 y.o. male with pertinent hx of severe COPD w worsening dyspnea with minimal exertion per his pulmonologist, nocturnal hypoxemia on nightime O2, afib on home rivaroxaban, presenting for evaluation of R perianal abscess. He was placed on abx and has had great improvement but has had remaining abscess cavity.       He otherwise denies lightheadedness, dizziness, fainting, passing out, headache, nausea, vomiting, chest pain, palpitations, shortness of breath, coughing, wheezing, heart burn, reflux, skin lesions, unintended weight loss/gain, sensitivity to heat/cold, changes in sensation, changes in vision/hearing/smell/taste, myalgias, arthralgias, and has no other acute complaints or concerning symptoms to report at this time.    Past Medical History     Past Medical History:  Past Medical History:   Diagnosis Date   • Arthritis    • Atrial fibrillation    • COPD (chronic obstructive pulmonary disease)    • Essential tremor    • Lung nodule 2014   • Pneumonia 2014   • Pulmonary arterial hypertension 2021       PCP: Harry Fontaine MD    Past Surgical History:  Past Surgical History:   Procedure Laterality Date   • FINGER SURGERY     • FOOT SURGERY         Family History:  Family History   Problem Relation Age of Onset   • Heart disease Mother    • Heart disease Father    • No Known Problems Sister    • No Known Problems Brother    • No Known Problems Maternal Aunt    • No Known Problems Maternal Uncle    • No Known Problems Paternal Aunt    • No Known Problems Paternal Uncle    • No Known Problems Maternal Grandmother    • No Known Problems Maternal Grandfather    • No Known Problems Paternal Grandmother    • No Known Problems Paternal Grandfather    • Asthma Neg Hx    • Cancer Neg Hx    • Diabetes Neg Hx    • Emphysema Neg Hx    • Heart failure Neg Hx    • Hypertension Neg Hx        Social History:  Social History     Tobacco Use   • Smoking status: Every Day     Current packs/day: 0.25      Average packs/day: 0.3 packs/day for 48.8 years (12.2 ttl pk-yrs)     Types: Cigarettes     Start date: 1/1/1976     Passive exposure: Current   • Smokeless tobacco: Never   • Tobacco comments:     He currently is smoking 4 to 5 cigarettes/day but in the past has smoked up to a pack of cigarettes per day and has well over 30-pack-year history of smoking.   Substance Use Topics   • Alcohol use: No       Allergies:  Allergies   Allergen Reactions   • Penicillins Other (See Comments)     Unknown         Medications:    Current Outpatient Medications:   •  albuterol (PROVENTIL) (2.5 MG/3ML) 0.083% nebulizer solution, Take 2.5 mg by nebulization Every 4 (Four) Hours As Needed for Wheezing., Disp: 360 mL, Rfl: 11  •  albuterol sulfate  (90 Base) MCG/ACT inhaler, INHALE 2 PUFFS EVERY 4 (FOUR) HOURS., Disp: 25.5 g, Rfl: 11  •  albuterol sulfate  (90 Base) MCG/ACT inhaler, Inhale 2 puffs Every 4 (Four) to 6 (Six) Hours As Needed for Wheezing or Shortness of Air., Disp: 18 g, Rfl: 0  •  albuterol sulfate  (90 Base) MCG/ACT inhaler, Inhale 2 puffs Every 4 (Four) Hours., Disp: 108 g, Rfl: 3  •  albuterol sulfate  (90 Base) MCG/ACT inhaler, Inhale 2 puffs Take As Directed. Take 2 puffs every 3-4 hours as needed while awake to a maximum of 12 puffs/day., Disp: 108 g, Rfl: 3  •  benzonatate (TESSALON) 200 MG capsule, Take 1 capsule by mouth 2 (Two) Times a Day As Needed., Disp: , Rfl:   •  budesonide-formoterol (SYMBICORT) 160-4.5 MCG/ACT inhaler, Inhale 2 puffs 2 (Two) Times a Day. Rinse and spit after using., Disp: 2 inhaler, Rfl: 0  •  dilTIAZem HCl Coated Beads (CARDIZEM CD PO), Take 240 mg by mouth., Disp: , Rfl:   •  O2 (OXYGEN), Inhale 3 L/min At Night As Needed., Disp: , Rfl:   •  predniSONE (DELTASONE) 10 MG tablet, Take 1 tablet by mouth Daily. with food, Disp: 90 tablet, Rfl: 3  •  predniSONE (DELTASONE) 10 MG tablet, Take 1 tablet by mouth 2 (Two) Times a Day., Disp: 180 tablet, Rfl: 3  •   "primidone (MYSOLINE) 50 MG tablet, Take 1 tablet by mouth Every Night., Disp: , Rfl:   •  rivaroxaban (XARELTO) 20 MG tablet, Take 1 tablet by mouth Daily., Disp: , Rfl:   •  sildenafil (VIAGRA) 100 MG tablet, Take 1 tablet by mouth Daily As Needed for Erectile Dysfunction., Disp: , Rfl:   •  tiotropium bromide monohydrate (SPIRIVA RESPIMAT) 2.5 MCG/ACT aerosol solution inhaler, Inhale 2 puffs Daily., Disp: , Rfl:   •  tiotropium bromide monohydrate (Spiriva Respimat) 2.5 MCG/ACT aerosol solution inhaler, Inhale 2 puffs Daily., Disp: 2 each, Rfl: 0  •  azithromycin (ZITHROMAX) 250 MG tablet, Take 1 tablet by mouth Daily. 5 days (Patient not taking: Reported on 10/15/2024), Disp: , Rfl:   •  diazePAM (VALIUM) 10 MG tablet, As Needed., Disp: , Rfl:         Review of Systems   Per HPI.     Physical Examination     Vitals:    10/15/24 0805   BP: 134/74   BP Location: Left arm   Patient Position: Sitting   Cuff Size: Adult   Pulse: 88   SpO2: (!) 84%   Weight: 62.2 kg (137 lb 3.2 oz)   Height: 175.3 cm (69.02\")       Estimated body mass index is 20.25 kg/m² as calculated from the following:    Height as of this encounter: 175.3 cm (69.02\").    Weight as of this encounter: 62.2 kg (137 lb 3.2 oz).  PREVIOUS WEIGHTS:   Wt Readings from Last 5 Encounters:   10/15/24 62.2 kg (137 lb 3.2 oz)   09/09/24 63 kg (139 lb)   04/17/24 69.1 kg (152 lb 6.4 oz)   10/11/23 69.9 kg (154 lb)   03/28/23 69.4 kg (153 lb)       Physical Examination:  Gen: awake, alert, sitting up in chair in no acute distress  HEENT: NCAT, EOMI, anicteric sclerae  CV: RRR, normotensive  Resp: nonlabored on room air, sats appropriate  Abd: soft, nontender, nondistended   : ~2cm x 2cm area of induration, ertythema at R perianal region -  firm and slightly fluctuant  MSK: moves all four, no c/c/e  Neuro: no focal deficits, cranial nerves grossly intact  Psy:  appropriate, cooperative      Data Review     Labs:  CBC  Lab Results   Component Value Date    WBC " 5.92 07/27/2019    HGB 14.8 07/27/2019    HCT 44.6 07/27/2019     07/27/2019      BMP  Lab Results   Component Value Date     07/27/2019    K 4.5 07/27/2019     07/27/2019    CO2 29.0 07/27/2019    BUN 22 (H) 07/27/2019    CREATININE 0.65 07/27/2019    GLUCOSE 79 07/27/2019    CALCIUM 9.4 07/27/2019      LFTs  Lab Results   Component Value Date    PROTEINTOT 7.0 07/27/2019    ALBUMIN 4.10 07/27/2019    BILITOT 0.7 07/27/2019    ALT 20 07/27/2019    AST 24 07/27/2019    ALKPHOS 82 07/27/2019      Coag  Lab Results   Component Value Date    PROTIME 17.1 (H) 07/27/2019    PTT 38.2 (H) 07/27/2019    INR 1.35 (H) 07/27/2019          Problem List     Patient Active Problem List   Diagnosis   • Shortness of breath   • Nocturnal hypoxemia   • COPD, severe   • Scarring of lung   • Cigarette nicotine dependence without complication   • Lung nodules   • Hypoxemia requiring supplemental oxygen   • COPD with acute exacerbation   • Paroxysmal atrial fibrillation   • Overweight   • Bronchitis   • Centrilobular emphysema   • Cutaneous abscess of buttock   • Perianal abscess        Assessment/Plan   Bin Oh is a 63 y.o. male with multiple comorbid conditions, presenting for evaluation of a right buttocks abscess. He has held his xarelto for the last three days. We performed an incision and drainage of the abscess in clinic today and placed an iodoform guaze to facilitate drainage.       Smoking cessation counseling: counseled regarding smoking cessation  BMI counseling: BMI is within normal parameters. No other follow-up for BMI required.  Med rec complete: yes  VTE: Not indicated    Time Spent: I spent 30 minutes caring for Bin Oh on this date of service. This time includes time, independent of any procedures, spent by me in the following activities: preparing for the clinic visit, reviewing tests, obtaining and/or reviewing a separately obtained history, performing a medically appropriate examination  and/or evaluation, counseling and educating the patient/family/caregiver, ordering medications, tests, or procedures, and documenting information in the medical record.     Doug George MD   10/15/2024 08:58 CDT

## 2024-10-15 NOTE — PROGRESS NOTES
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      Discussion  We discussed his pathology at length today  His disease is stable with 4 of 12 cores [place] 6 disease identified in 5% of the core  I recommend continuing active surveillance  Follow-up will be in 4 months with his next PSA  We discussed considering a prostate biopsy in the next few years for surveillance  If the PSA were to spike dramatically between now and then we discussed a multiparametric MRI of the prostate  The patient is amenable with this plan  History of Present Illness  77 y o  male with a history of a prostate biopsy in 2016 revealing atypical cells  A repeat biopsy in 2017 revealed 3 cores out of 12 with George 6 prostate cancer in up to 90% of the cores  At that time he opted for active surveillance  His PSA has remained relatively stable at just around 4 with his most recent PSA being 4 2   1/2 years prior in early 2017 his PSA was 4 1  He recently had a surveillance biopsy and returns to the office today to discuss the results  His biopsy is relatively unchanged with 4 of 12 cores now revealing [place] 6 prostate cancer in 5% of the cores  AUA Symptom Score      Review of Systems  Review of Systems   Constitutional: Negative  HENT: Negative  Eyes: Negative  Respiratory: Negative  Cardiovascular: Negative  Gastrointestinal: Negative  Endocrine: Negative  Genitourinary: Negative  Musculoskeletal: Negative  Skin: Negative  Allergic/Immunologic: Negative  Neurological: Negative  Hematological: Negative  Psychiatric/Behavioral: Negative            Past Medical History  Past Medical History:   Diagnosis Date    Hyperglycemia     Prostate cancer [facility])        Past Social History  Past Surgical History:   Procedure Laterality Date    PROSTATE BIOPSY         Past Family History  Family History Procedure   Incision & Drainage    Date/Time: 10/15/2024 8:59 AM    Performed by: Doug George MD  Authorized by: Doug George MD  Type: abscess  Body area: anogenital  Location details: perianal  Anesthesia: local infiltration    Anesthesia:  Local Anesthetic: lidocaine 1% with epinephrine    Sedation:  Patient sedated: no    Risk factor: coagulopathy  Scalpel size: 15  Needle gauge: 22  Incision type: single straight  Complexity: simple  Drainage: purulent and bloody  Drainage amount: moderate  Wound treatment: wound left open  Packing material: 1/2 in gauze  Patient tolerance: patient tolerated the procedure well with no immediate complications          Doug George MD  10/15/24  09:00 CDT        Problem Relation Age of Onset    Cancer Mother     Cancer Father        Past Social history  Social History     Social History    Marital status: /Civil Union     Spouse name: N/A    Number of children: N/A    Years of education: N/A     Occupational History    Not on file  Social History Main Topics    Smoking status: Never Smoker    Smokeless tobacco: Never Used    Alcohol use Yes     2 Glasses of wine, 2 Cans of beer per week      Comment: a couple a week/social     Drug use: No    Sexual activity: Yes     Partners: Female     Birth control/ protection: None     Other Topics Concern    Not on file     Social History Narrative    No narrative on file       Current Medications  Current Outpatient Prescriptions   Medication Sig Dispense Refill    atorvastatin (LIPITOR) 10 mg tablet Take 1 tablet (10 mg total) by mouth daily 90 tablet 3    Cholecalciferol (VITAMIN D3) 1000 units CAPS Take 1 capsule by mouth daily        glucose blood (FREESTYLE LITE) test strip Test 2X Daily E11 9 100 each 5    Lancets (FREESTYLE) lancets DX E 11 9 Check blood sugar twice daily  100 each 6    metFORMIN (GLUCOPHAGE) 500 mg tablet Take 1 tablet (500 mg total) by mouth daily 90 tablet 3    metoprolol succinate (TOPROL-XL) 50 mg 24 hr tablet TAKE ONE TABLET BY MOUTH EVERY DAY 90 tablet 1    Omega-3 Fatty Acids (FISH OIL) 1,000 mg Take by mouth      ALPRAZolam (XANAX) 0 25 mg tablet Take 1 tablet by mouth 2 (two) times a day as needed       No current facility-administered medications for this visit  Allergies  Allergies   Allergen Reactions    Penicillins        Past Medical History, Social History, Family History, medications and allergies were reviewed      Vitals  Vitals:    11/20/18 1144   BP: 160/98   BP Location: Left arm   Patient Position: Sitting   Cuff Size: Adult   Weight: 66 9 kg (147 lb 6 4 oz)   Height: 5' 8" (1 727 m)       Physical Exam  Physical Exam        Results  Lab Results Component Value Date    PSA 4 2 (H) 10/22/2018    PSA 3 2 07/09/2018    PSA 3 2 03/20/2018     Lab Results   Component Value Date    GLUCOSE 126 11/17/2015    CALCIUM 9 7 10/22/2018     11/17/2015    K 4 0 10/22/2018    CO2 29 10/22/2018     10/22/2018    BUN 16 10/22/2018    CREATININE 0 84 10/22/2018     No results found for: WBC, HGB, HCT, MCV, PLT      Office Urine Dip  No results found for this or any previous visit (from the past 1 hour(s))  ]      Total visit time was 25 minutes of which over 50% was spent on counseling

## 2024-10-16 ENCOUNTER — TELEPHONE (OUTPATIENT)
Age: 64
End: 2024-10-16
Payer: MEDICARE

## 2024-10-16 DIAGNOSIS — K61.0 PERIANAL ABSCESS: Primary | ICD-10-CM

## 2024-10-16 NOTE — TELEPHONE ENCOUNTER
Kyra called again and stated that they do not do everyday wound care at his house, I had to place a referral for [facility] wound care for them to come out and change everyday

## 2024-10-16 NOTE — TELEPHONE ENCOUNTER
I attempted to call her back, per Dr. George he needs to use the Idoform that was given to him in the office and then once he runs out he needs to do wet to dry packing with a end of a quaze. I relayed all of this on a voicemail and if she has any questions to give our office a call back and ask for Estephania.

## 2024-10-17 ENCOUNTER — TELEPHONE (OUTPATIENT)
Dept: SURGERY | Facility: CLINIC | Age: 64
End: 2024-10-17
Payer: MEDICARE

## 2024-10-17 ENCOUNTER — CLINICAL SUPPORT (OUTPATIENT)
Dept: SURGERY | Facility: CLINIC | Age: 64
End: 2024-10-17
Payer: MEDICARE

## 2024-10-17 NOTE — PROGRESS NOTES
Pt here today d/t unable to pack incision. 2 large bandaide's removed. No drainage noted. Surrounding skin pink.  Placed 4X4 gauze and medipore tape to site.

## 2024-10-17 NOTE — TELEPHONE ENCOUNTER
I have tried to reach out to every home health that is available and either they are able to go out more than once a week or they do not serve his area. Pt needs a wound packed every day. Pt is coming today for nurse visit they are going to do wet to dry dressing instead. 10/17/2024 LANG

## 2024-10-24 ENCOUNTER — NURSE TRIAGE (OUTPATIENT)
Dept: CALL CENTER | Facility: HOSPITAL | Age: 64
End: 2024-10-24
Payer: MEDICARE

## 2024-10-24 NOTE — TELEPHONE ENCOUNTER
[facility] called. Patient fell while out at doctor's appointment today. Has large skin tear on right forearm and large skin tear on left thumb. They are not permitted to do wound care at the facility. Need to know if you can refer home health wound care to see patient today or if they should take him to the ER for wound care. Call Jerilyn rico at [phone].    Reason for Disposition   Minor cut or scratch    Additional Information   Negative: [1] Major bleeding (e.g., actively dripping or spurting) AND [2] can't be stopped   Negative: Amputation   Negative: Shock suspected (e.g., cold/pale/clammy skin, too weak to stand, low BP, rapid pulse)   Negative: [1] Knife wound (or other possibly deep cut) AND [2] to chest, abdomen, back, neck, or head   Negative: Sounds like a life-threatening emergency to the triager   Negative: Animal bite   Negative: Human bite   Negative: Puncture wound   Negative: Foreign body in skin (e.g., splinter, sliver)   Negative: Bruises not from an injury   Negative: Electrical burn   Negative: Chemical burn   Negative: Thermal burn   Negative: Wound infection suspected   Negative: [1] Bleeding AND [2] won't stop after 10 minutes of direct pressure (using correct technique)   Negative: Skin is split open or gaping (or length > 1/2 inch or 12 mm on the skin, 1/4 inch or 6 mm on the face)   Negative: [1] Deep cut AND [2] can see bone or tendons   Negative: Skin loss goes very deep (e.g., can see bones or tendons)   Negative: Skin loss involves more than 10% of body surface area (BSA)   Negative: [1] Dirt in the wound AND [2] not removed with 15 minutes of scrubbing   Negative: High pressure injection injury (e.g., from grease gun or paint gun, usually work-related)   Negative: Wound causes numbness (i.e., loss of sensation)   Negative: Wound causes weakness (i.e., decreased ability to move hand, finger, toe)   Negative: Sounds like a serious injury to the triager   Negative: [1]  "SEVERE pain AND [2] not improved 2 hours after pain medicine/ice packs   Negative: [1] Looks infected AND [2] large red area (> 2 inches or 5 cm) or streak   Negative: [1] Looks infected (spreading redness, red streak, pus) AND [2] fever   Negative: Suspicious history for the injury   Negative: [1] Raised bruise AND [2] size > 2 inches (5 cm) AND [3] expanding   Negative: [1] Looks infected (spreading redness, pus) AND [2] no fever   Negative: No prior tetanus shots (or is not fully vaccinated)   Negative: [1] HIV positive or severe immunodeficiency (severely weak immune system) AND [2] DIRTY cut or scrape   Negative: [1] Last tetanus shot > 5 years ago AND [2] DIRTY cut or scrape   Negative: [1] Last tetanus shot > 10 years ago AND [2] CLEAN cut or scrape (e.g., object AND skin were clean)   Negative: [1] After 14 days AND [2] wound isn't healed   Negative: [1] Minor skin injury (e,g,. minor cut, scratch, scrape) on foot AND [2] diabetes (diabetes mellitus)   Negative: [1] Minor cut, scratch, scrape, or scab AND [2] from self-injury (e.g., cutting, picking; self-harm) AND [3] stable (i.e., not suicidal, not out of control)    Answer Assessment - Initial Assessment Questions  1. APPEARANCE of INJURY: \"What does the injury look like?\"       Skin tears  2. SIZE: \"How large is the cut?\"       Large skin tears  3. BLEEDING: \"Is it bleeding now?\" If Yes, ask: \"Is it difficult to stop?\"       No   4. LOCATION: \"Where is the injury located?\"       Right forearm and left thumb  5. ONSET: \"How long ago did the injury occur?\"       Today   6. MECHANISM: \"Tell me how it happened.\"       Fall while out at doctor's appointment  7. TETANUS: \"When was the last tetanus booster?\"      Unknown   8. PREGNANCY: \"Is there any chance you are pregnant?\" \"When was your last menstrual period?\"      N/A    Protocols used: Skin Injury-ADULT-AH    "

## 2024-11-04 ENCOUNTER — OFFICE VISIT (OUTPATIENT)
Dept: PULMONOLOGY | Facility: CLINIC | Age: 64
End: 2024-11-04
Payer: OTHER MISCELLANEOUS

## 2024-11-04 VITALS
HEIGHT: 69 IN | SYSTOLIC BLOOD PRESSURE: 128 MMHG | DIASTOLIC BLOOD PRESSURE: 70 MMHG | OXYGEN SATURATION: 92 % | BODY MASS INDEX: 21.48 KG/M2 | HEART RATE: 115 BPM | WEIGHT: 145 LBS

## 2024-11-04 DIAGNOSIS — J01.90 ACUTE NON-RECURRENT SINUSITIS, UNSPECIFIED LOCATION: ICD-10-CM

## 2024-11-04 DIAGNOSIS — R91.8 LUNG NODULES: Chronic | ICD-10-CM

## 2024-11-04 DIAGNOSIS — J44.9 COPD, SEVERE: Primary | Chronic | ICD-10-CM

## 2024-11-04 DIAGNOSIS — G47.34 NOCTURNAL HYPOXEMIA: Chronic | ICD-10-CM

## 2024-11-04 DIAGNOSIS — Z12.2 SCREENING FOR MALIGNANT NEOPLASM OF RESPIRATORY ORGAN: ICD-10-CM

## 2024-11-04 DIAGNOSIS — F17.210 CIGARETTE NICOTINE DEPENDENCE WITHOUT COMPLICATION: Chronic | ICD-10-CM

## 2024-11-04 DIAGNOSIS — J43.2 CENTRILOBULAR EMPHYSEMA: Chronic | ICD-10-CM

## 2024-11-04 PROCEDURE — 99214 OFFICE O/P EST MOD 30 MIN: CPT | Performed by: INTERNAL MEDICINE

## 2024-11-04 RX ORDER — PREDNISONE 10 MG/1
10 TABLET ORAL DAILY
Qty: 90 TABLET | Refills: 3 | Status: SHIPPED | OUTPATIENT
Start: 2024-11-04

## 2024-11-04 RX ORDER — VARENICLINE TARTRATE 1 MG/1
1 TABLET, FILM COATED ORAL 2 TIMES DAILY
Qty: 60 TABLET | Refills: 4 | Status: SHIPPED | OUTPATIENT
Start: 2024-11-04

## 2024-11-04 RX ORDER — VARENICLINE TARTRATE 1 MG/1
1 TABLET, FILM COATED ORAL 2 TIMES DAILY
Qty: 60 TABLET | Refills: 5 | Status: SHIPPED | OUTPATIENT
Start: 2024-11-04 | End: 2024-11-04

## 2024-11-04 RX ORDER — VARENICLINE TARTRATE 0.5 (11)-1
1 KIT ORAL TAKE AS DIRECTED
Qty: 1 EACH | Refills: 0 | Status: SHIPPED | OUTPATIENT
Start: 2024-11-04

## 2024-11-04 RX ORDER — AZITHROMYCIN 250 MG/1
TABLET, FILM COATED ORAL
Qty: 6 TABLET | Refills: 0 | Status: SHIPPED | OUTPATIENT
Start: 2024-11-04

## 2024-11-04 NOTE — ASSESSMENT & PLAN NOTE
He is going to try to quit smoking with the help of Chantix.  This was effective in helping him quit before but he resumed walking when there was a death in the family that caused a lot of stress.  Also he has not been able to quit via cold turkey methods or with the use of nicotine replacement.  I did E prescribe a starter pack and a maintenance pack and he can fill the maintenance pack up to 5 times.

## 2024-11-04 NOTE — ASSESSMENT & PLAN NOTE
I will continue yearly screening CTs as long as he meets criteria.  His most recent CT revealed no new or suspicious nodules.

## 2024-11-04 NOTE — PATIENT INSTRUCTIONS
The patient is accompanied by his nurse Daksha today.  He is now at the [facility] facility and is going to hopefully start rehab soon.  He would like to try Chantix again which she has used in the past I e-prescribed a starter pack plus a maintenance pack with total of 4 refills for a total of up to 6 months of therapy.  He also thinks he may be developing a sinus infection I did E prescribe a Z-James and also refilled his prednisone.  I will see him back in about 6 months with a screening chest CT.

## 2024-11-04 NOTE — PROGRESS NOTES
Chief Complaint  COPD    Subjective    History of Present Illness {CC  Problem List  Visit Diagnosis   Encounters  Notes  Medications  Labs  Result Review Imaging  Media: 23}    Bin Oh presents to [facility] for COPD.    History of Present Illness   The patient now resides at the [facility] facility.  He is accompanied by one of his nurses Daksha today.  He has not yet started any physical therapy but is hopeful this can be arranged in the near future.  I did tell him we will just plan on a follow-up visit in several months and he will continue his current regimen of inhaled bronchodilators and will continue to work on smoking cessation and I e-prescribed Chantix therapy including both the starter pack with no refills and then a maintenance pack which she can fill up to 5 times.  He does take prednisone usually 10 mg a day and would like a refill of that and I did E prescribe that.  I told to make sure his primary care physician was following his bone density status well on prednisone.  He also is concerned he may be getting an early sinus infection and I prescribed a Zithromax Z-PEE as well.  He has had a Prevnar 13 and Pneumovax in the past.  He has declined the flu shot previously.  Prior to Admission medications    Medication Sig Start Date End Date Taking? Authorizing Provider   albuterol (PROVENTIL) (2.5 MG/3ML) 0.083% nebulizer solution Take 2.5 mg by nebulization Every 4 (Four) Hours As Needed for Wheezing. 3/18/23  Yes Korey Vyas MD   albuterol sulfate  (90 Base) MCG/ACT inhaler INHALE 2 PUFFS EVERY 4 (FOUR) HOURS. 3/12/24  Yes Korey Vyas MD   benzonatate (TESSALON) 200 MG capsule Take 1 capsule by mouth 2 (Two) Times a Day As Needed.   Yes Provider, MD Sanaz   budesonide-formoterol (SYMBICORT) 160-4.5 MCG/ACT inhaler Inhale 2 puffs 2 (Two) Times a Day. Rinse and spit after using. 2/18/20  Yes Asael  Kroey Gibson MD   diazePAM (VALIUM) 10 MG tablet As Needed. 12/18/19  Yes Sanaz Bunch MD   dilTIAZem HCl Coated Beads (CARDIZEM CD PO) Take 360 mg by mouth.   Yes Sanaz Bunch MD   O2 (OXYGEN) Inhale 3 L/min At Night As Needed.   Yes Sanaz Bunch MD   predniSONE (DELTASONE) 10 MG tablet Take 1 tablet by mouth Daily. with food 11/4/24  Yes Korey Vyas MD   primidone (MYSOLINE) 50 MG tablet Take 1 tablet by mouth Every Night.   Yes Sanaz Bunch MD   rivaroxaban (XARELTO) 20 MG tablet Take 1 tablet by mouth Daily.   Yes Sanaz Bunch MD   sildenafil (VIAGRA) 100 MG tablet Take 1 tablet by mouth Daily As Needed for Erectile Dysfunction.   Yes Sanaz Bunch MD   tiotropium bromide monohydrate (SPIRIVA RESPIMAT) 2.5 MCG/ACT aerosol solution inhaler Inhale 2 puffs Daily.   Yes Sanaz Bunch MD   tiotropium bromide monohydrate (Spiriva Respimat) 2.5 MCG/ACT aerosol solution inhaler Inhale 2 puffs Daily. 9/26/22  Yes Korey Vyas MD   varenicline (CHANTIX) 1 MG tablet Take 1 tablet by mouth 2 (Two) Times a Day. 11/4/24  Yes Korey Vyas MD   albuterol sulfate  (90 Base) MCG/ACT inhaler Inhale 2 puffs Every 4 (Four) to 6 (Six) Hours As Needed for Wheezing or Shortness of Air.  Patient not taking: Reported on 11/4/2024 9/16/24   Bibi Herrera APRN   albuterol sulfate  (90 Base) MCG/ACT inhaler Inhale 2 puffs Every 4 (Four) Hours.  Patient not taking: Reported on 11/4/2024 9/18/24   Korey Vyas MD   albuterol sulfate  (90 Base) MCG/ACT inhaler Inhale 2 puffs Take As Directed. Take 2 puffs every 3-4 hours as needed while awake to a maximum of 12 puffs/day.  Patient not taking: Reported on 11/4/2024 10/2/24   Korey Vyas MD   azithromycin (ZITHROMAX) 250 MG tablet Take 1 tablet by mouth Daily. 5 days  Patient not taking: Reported on 9/9/2024    Provider, Historical, MD   azithromycin  "(ZITHROMAX) 250 MG tablet Take 2 by mouth today then 1 daily for 4 days 11/4/24   Korey Vyas MD   predniSONE (DELTASONE) 10 MG tablet Take 1 tablet by mouth 2 (Two) Times a Day.  Patient not taking: Reported on 11/4/2024 4/7/23   Korey Vyas MD   Varenicline Tartrate, Starter, (Chantix Starting Month Pak) 0.5 MG X 11 & 1 MG X 42 tablet therapy pack Take 1 tablet by mouth Take As Directed. 11/4/24   Korey Vyas MD   predniSONE (DELTASONE) 10 MG tablet Take 1 tablet by mouth Daily. with food  Patient not taking: Reported on 11/4/2024 4/7/23 11/4/24  Korey Vyas MD   varenicline (CHANTIX) 1 MG tablet Take 1 tablet by mouth 2 (Two) Times a Day. 11/4/24 11/4/24  Korey Vyas MD       Social History     Socioeconomic History    Marital status:    Tobacco Use    Smoking status: Every Day     Current packs/day: 0.25     Average packs/day: 0.3 packs/day for 48.8 years (12.2 ttl pk-yrs)     Types: Cigarettes     Start date: 1/1/1976     Passive exposure: Current    Smokeless tobacco: Never    Tobacco comments:     He currently is smoking 4 to 5 cigarettes/day but in the past has smoked up to a pack of cigarettes per day and has well over 30-pack-year history of smoking.   Vaping Use    Vaping status: Never Used   Substance and Sexual Activity    Alcohol use: No    Drug use: No    Sexual activity: Yes     Partners: Female       Objective   Vital Signs:   /70   Pulse 115   Ht 175.3 cm (69\")   Wt 65.8 kg (145 lb)   SpO2 92% Comment: RA  BMI 21.41 kg/m²     Physical Exam  Vitals and nursing note reviewed.   Constitutional:       Comments: He was tachycardic initially with a pulse of 115 but after sitting and resting in the exam room for a few minutes his pulse came down to 100.   HENT:      Head: Normocephalic.   Eyes:      Pupils: Pupils are equal, round, and reactive to light.   Cardiovascular:      Rate and Rhythm: Regular rhythm. Tachycardia present.     "  Comments: Again he was tachycardic initially with a pulse of 115 but after sitting and resting exam room for a few minutes his pulse came down to approximately 100.  Pulmonary:      Effort: Pulmonary effort is normal.      Comments: Breath sounds are diminished throughout but no adventitious sounds are heard.  Musculoskeletal:         General: Normal range of motion.   Skin:     General: Skin is warm and dry.   Neurological:      General: No focal deficit present.      Mental Status: He is alert and oriented to person, place, and time.   Psychiatric:         Mood and Affect: Mood normal.         Behavior: Behavior normal.        Result Review :    PFT Values          2023    11:00 10/11/2023    15:00 2024    09:00   Pre Drug PFT Results   FVC 57 60 54   FEV1 34 34 31   FEF 25-75% 16 14 11   FEV1/FVC 47 44 45.27   Other Tests PFT Results   TLC 87 89 89    152 128   DLCO 40 31 44   D/VAsb 55 46 56         Results for orders placed in visit on 24    Spirometry with Diffusion Capacity & Lung Volumes    Narrative  Spirometry with Diffusion Capacity & Lung Volumes    Performed by: Vignesh Pacheco CMA  Authorized by: Korey Vyas MD  Pre Drug % Predicted  FVC: 54%  FEV1: 31%  FEF 25-75%: 11%  FEV1/FVC: 45.27%  T%  RV: 128%  DLCO: 44%  D/VAsb: 56%    Interpretation  Spirometry  Spirometry shows severe.  Lung Volume Measurements  Measurements show elevated residual volume consistent with gas trapping.  Diffusion Capacity  The patient's diffusion capacity is moderately reduced.  Diffusion capacity is moderately reduced when corrected for alveolar volume.  Overall comments: The patient's spirometry is consistent with a severe bordering on very severe obstructive ventilatory defect.  Lung volumes reveal a decrease in vital capacity secondary to obstruction along with hyperinflation.  There is a moderate bordering on severe diffusion impairment which when corrected for alveolar volume is  still a moderate diffusion impairment.  When current studies are compared to studies performed on 2023, there has been a decline in the patient's FVC and FEV1 compared to previous.  The degree of hyperinflation has improved somewhat compared to previous.  When corrected for alveolar volume there has been some improvement in diffusion capacity compared to previous.      Results for orders placed in visit on 10/11/23    Spirometry with Diffusion Capacity & Lung Volumes    Narrative  Spirometry with Diffusion Capacity & Lung Volumes    Performed by: Ana Lilia Dubon, RRT  Authorized by: Korey Vyas MD  Pre Drug % Predicted  FVC: 60%  FEV1: 34%  FEF 25-75%: 14%  FEV1/FVC: 44%  T%  RV: 152%  DLCO: 31%  D/VAsb: 46%    Interpretation  Spirometry  Spirometry shows severe obstruction.  Lung Volume Measurements  Measurements show elevated residual volume consistent with gas trapping.  Diffusion Capacity  The patient's diffusion capacity is severely reduced.  Diffusion capacity is moderately reduced when corrected for alveolar volume.  Overall comments: The patient's spirometry is consistent with a severe obstructive ventilatory defect.  Lung volumes reveal hyperinflation and there is also a decrease in vital capacity secondary obstruction as well as a decrease in inspiratory capacity.  There is a severe diffusion impairment which when corrected for alveolar volume is a moderate diffusion impairment.  When current studies are compared to studies performed on 2023, there has been slight improvement in the patient's FVC and no change in his FEV1 compared to previous.  The degree of hyperinflation has worsened minimally but not significantly.  When corrected for alveolar volume there has been a decline in diffusion capacity compared to previous.      Results for orders placed in visit on 23    Full Pulmonary Function Test Without Bronchodilator    Narrative  Full Pulmonary  Function Test Without Bronchodilator  Performed by: Ana Lilia Dubon, RRT  Authorized by: Korey Vyas MD    Pre Drug % Predicted  FVC: 57%  FEV1: 34%  FEF 25-75%: 16%  FEV1/FVC: 47%  T%  RV: 148%  DLCO: 40%  D/VAsb: 55%    Interpretation  Spirometry  Spirometry shows severe obstruction.  Lung Volume Measurements  Measurements show elevated residual volume consistent with gas trapping.  Diffusion Capacity  The patient's diffusion capacity is moderately reduced.  Diffusion capacity is moderately reduced when corrected for alveolar volume.  Overall comments: The patient's spirometry is consistent with a severe obstructive ventilatory defect.  Lung volumes reveal a decrease in vital capacity secondary to obstruction and hyperinflation is also present.  There is also a decrease in inspiratory capacity.  There is a moderate bordering on severe diffusion impairment which when corrected for alveolar volume is still a moderate diffusion impairment.  When current studies are compared to studies performed on October 10, 2022, there has been no significant change in spirometry compared to previous.  There has been slight improvement in the degree of hyperinflation compared to previous.  When corrected for alveolar volume there has been slight improvement in diffusion capacity compared to previous.                 My interpretation of imaging:    CT Chest Without Contrast Diagnostic (04/15/2024 08:40)         Assessment and Plan {CC Problem List  Visit Diagnosis  ROS  Review (Popup)  Health Maintenance  Quality  BestPractice  Medications  SmartSets  SnapShot Encounters  Media : 23}    Diagnoses and all orders for this visit:    1. COPD, severe (Primary)  Assessment & Plan:  He will continue his Symbicort and Spiriva as maintenance therapy.  Advised him to try to avoid taking the albuterol HFA more than 12 puffs daily and he states sometimes just 1 puff every 4-6 hours will control his symptoms.   He can still use his albuterol neb treatments as needed as well.    Orders:  -     predniSONE (DELTASONE) 10 MG tablet; Take 1 tablet by mouth Daily. with food  Dispense: 90 tablet; Refill: 3    2. Centrilobular emphysema  Assessment & Plan:  This is associated with his COPD has been noted on prior imaging studies.      3. Acute non-recurrent sinusitis, unspecified location  Assessment & Plan:  He thinks he may be developing a sinus infection and I e-prescribed a Z-James for him.    Orders:  -     azithromycin (ZITHROMAX) 250 MG tablet; Take 2 by mouth today then 1 daily for 4 days  Dispense: 6 tablet; Refill: 0    4. Lung nodules  Assessment & Plan:  I will continue yearly screening CTs as long as he meets criteria.  His most recent CT revealed no new or suspicious nodules.      5. Nocturnal hypoxemia  Assessment & Plan:  He will continue his nocturnal oxygen therapy and can also wear it during the day as needed.      6. Cigarette nicotine dependence without complication  Assessment & Plan:  He is going to try to quit smoking with the help of Chantix.  This was effective in helping him quit before but he resumed walking when there was a death in the family that caused a lot of stress.  Also he has not been able to quit via cold turkey methods or with the use of nicotine replacement.  I did E prescribe a starter pack and a maintenance pack and he can fill the maintenance pack up to 5 times.                                            Orders:  -     Discontinue: varenicline (CHANTIX) 1 MG tablet; Take 1 tablet by mouth 2 (Two) Times a Day.  Dispense: 60 tablet; Refill: 5  -     Varenicline Tartrate, Starter, (Chantix Starting Month James) 0.5 MG X 11 & 1 MG X 42 tablet therapy pack; Take 1 tablet by mouth Take As Directed.  Dispense: 1 each; Refill: 0  -     varenicline (CHANTIX) 1 MG tablet; Take 1 tablet by mouth 2 (Two) Times a Day.  Dispense: 60 tablet; Refill: 4  -      CT Chest Low Dose Cancer Screening WO;  Future    7. Screening for malignant neoplasm of respiratory organ  -      CT Chest Low Dose Cancer Screening WO; Future          Korey Vyas MD  11/4/2024  16:44 CST    Follow Up   Return in about 6 months (around 5/4/2025) for To see me specifically.    Patient was given instructions and counseling regarding his condition or for health maintenance advice. Please see specific information pulled into the AVS if appropriate.

## 2024-11-04 NOTE — ASSESSMENT & PLAN NOTE
He will continue his Symbicort and Spiriva as maintenance therapy.  Advised him to try to avoid taking the albuterol HFA more than 12 puffs daily and he states sometimes just 1 puff every 4-6 hours will control his symptoms.  He can still use his albuterol neb treatments as needed as well.

## 2024-11-08 ENCOUNTER — TRANSCRIBE ORDERS (OUTPATIENT)
Dept: PHYSICAL THERAPY | Facility: HOSPITAL | Age: 64
End: 2024-11-08
Payer: MEDICARE

## 2024-11-08 DIAGNOSIS — J84.112 IDIOPATHIC PULMONARY FIBROSIS: Primary | ICD-10-CM

## 2024-11-12 ENCOUNTER — HOME HEALTH ADMISSION (OUTPATIENT)
Dept: HOME HEALTH SERVICES | Facility: HOME HEALTHCARE | Age: 64
End: 2024-11-12
Payer: MEDICARE

## 2024-11-12 ENCOUNTER — TRANSCRIBE ORDERS (OUTPATIENT)
Dept: HOME HEALTH SERVICES | Facility: HOME HEALTHCARE | Age: 64
End: 2024-11-12
Payer: MEDICARE

## 2024-11-12 DIAGNOSIS — J84.112 IDIOPATHIC PULMONARY FIBROSIS: Primary | ICD-10-CM

## 2024-11-13 ENCOUNTER — HOME CARE VISIT (OUTPATIENT)
Dept: HOME HEALTH SERVICES | Facility: CLINIC | Age: 64
End: 2024-11-13
Payer: MEDICARE

## 2024-11-13 VITALS
TEMPERATURE: 97 F | OXYGEN SATURATION: 94 % | DIASTOLIC BLOOD PRESSURE: 60 MMHG | HEART RATE: 100 BPM | RESPIRATION RATE: 22 BRPM | BODY MASS INDEX: 21.19 KG/M2 | WEIGHT: 148 LBS | HEIGHT: 70 IN | SYSTOLIC BLOOD PRESSURE: 114 MMHG

## 2024-11-13 PROCEDURE — G0299 HHS/HOSPICE OF RN EA 15 MIN: HCPCS

## 2024-11-13 NOTE — Clinical Note
"SOC Note: 64 year old male admitted to [facility] post  hospital stay due to Idiopathic Pulmonary Fibrosis. Pt states he was dx with this aprox 10 years ago after employment at [employer]. Pt does have [employer] , [employer] [facility], for nursing and they come almost every day. Pt states extreme weakness and is eager for physical therapy to begin. Pt uses O2 at HS at 3L. Pt has just moved to [facility] after much personal discord.     Home Health ordered for: disciplines PT and MSW    Reason for Hosp/Primary Dx/Co-morbidities: Ideopathic Pulmonary Fibrosis. Pt also has had severe weight loss and anxiety. Pt does have numerous skin tears on arms but does not want them visualized. Pt states he is independent with care of skin tears.     Focus of Care: Ideopathic Pulmonary Fibrosis    Patient's goal(s):'To be independent\"    Current Functional status/mobility/DME: Pt has a walker, an electric wheelchair and O2    HB status/Living Arrangements: Pt lives at [facility]    Skin Integrity/wound status: Very fragile skin with numerous skin tears that he maintains himself    Code Status: Full code    Fall Risk/Safety concerns: Pt is at risk for falls due to weakness and unsteady gait    Educated on Emergency Plan, steps to take prior to going to the ER and when to Call Home Health First:  Instructed to call home health prior to ER visit unless an emergency occurs.    Medication issues/Concerns:none    Additional Problems/Concerns: none    SDOH Barriers (i.e. caregiver concerns, social isolation, transportation, food insecurity, environment, income etc.)/Need for MSW: MSW for community resources, financial and insurance needs"

## 2024-11-14 ENCOUNTER — HOME CARE VISIT (OUTPATIENT)
Dept: HOME HEALTH SERVICES | Facility: CLINIC | Age: 64
End: 2024-11-14
Payer: MEDICARE

## 2024-11-14 VITALS
SYSTOLIC BLOOD PRESSURE: 125 MMHG | TEMPERATURE: 97.7 F | RESPIRATION RATE: 16 BRPM | OXYGEN SATURATION: 96 % | HEART RATE: 120 BPM | DIASTOLIC BLOOD PRESSURE: 60 MMHG

## 2024-11-14 PROCEDURE — G0151 HHCP-SERV OF PT,EA 15 MIN: HCPCS

## 2024-11-15 NOTE — HOME HEALTH
"SOC Note: 64 year old male admitted to [facility] post  hospital stay due to Idiopathic Pulmonary Fibrosis. Pt states he was dx with this aprox 10 years ago after employment at [employer]. 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, for nursing and they come almost every day. Pt states extreme weakness and is eager for physical therapy to begin. Pt uses O2 at HS at 3L. Pt has just moved to [facility] after much personal discord.     Home Health ordered for: disciplines PT and MSW    Reason for Hosp/Primary Dx/Co-morbidities: Ideopathic Pulmonary Fibrosis. Pt also has had severe weight loss and anxiety. Pt does have numerous skin tears on arms but does not want them visualized. Pt states he is independent with care of skin tears.     Focus of Care: Ideopathic Pulmonary Fibrosis    Patient's goal(s):'To be independent\"    Current Functional status/mobility/DME: Pt has a walker, an electric wheelchair and O2    HB status/Living Arrangements: Pt lives at [facility]    Skin Integrity/wound status: Very fragile skin with numerous skin tears that he maintains himself    Code Status: Full code    Fall Risk/Safety concerns: Pt is at risk for falls due to weakness and unsteady gait    Educated on Emergency Plan, steps to take prior to going to the ER and when to Call Home Health First:  Instructed to call home health prior to ER visit unless an emergency occurs.    Medication issues/Concerns:none    Additional Problems/Concerns: none    SDOH Barriers (i.e. caregiver concerns, social isolation, transportation, food insecurity, environment, income etc.)/Need for MSW: MSW to community resources, financial and insurance needs"

## 2024-11-19 ENCOUNTER — TELEPHONE (OUTPATIENT)
Dept: PULMONOLOGY | Facility: CLINIC | Age: 64
End: 2024-11-19
Payer: MEDICARE

## 2024-11-19 DIAGNOSIS — J01.90 ACUTE NON-RECURRENT SINUSITIS, UNSPECIFIED LOCATION: Primary | ICD-10-CM

## 2024-11-19 RX ORDER — AZITHROMYCIN 250 MG/1
TABLET, FILM COATED ORAL
Qty: 6 TABLET | Refills: 0 | Status: SHIPPED | OUTPATIENT
Start: 2024-11-19

## 2024-11-19 NOTE — TELEPHONE ENCOUNTER
Caller: Bin Oh    Relationship to patient: Self    Best call back number:     [phone] (Home)       Patient is needing: PT IS WANTING TO KNOW IF WE CAN CALL HIM IN ANOTHER [facility] - [address] - [phone]  - [phone]  [phone]

## 2024-11-19 NOTE — TELEPHONE ENCOUNTER
Please call the patient and let him know I sent in a another Z-James prescription to [facility].  Thanks.

## 2024-11-20 ENCOUNTER — HOME CARE VISIT (OUTPATIENT)
Dept: HOME HEALTH SERVICES | Facility: CLINIC | Age: 64
End: 2024-11-20
Payer: MEDICARE

## 2024-11-20 VITALS
OXYGEN SATURATION: 94 % | SYSTOLIC BLOOD PRESSURE: 116 MMHG | HEART RATE: 119 BPM | TEMPERATURE: 98.7 F | RESPIRATION RATE: 26 BRPM | DIASTOLIC BLOOD PRESSURE: 60 MMHG

## 2024-11-20 PROCEDURE — G0157 HHC PT ASSISTANT EA 15: HCPCS

## 2024-11-21 DIAGNOSIS — J44.9 COPD, SEVERE: ICD-10-CM

## 2024-11-21 RX ORDER — ALBUTEROL SULFATE 0.83 MG/ML
2.5 SOLUTION RESPIRATORY (INHALATION) EVERY 4 HOURS PRN
Qty: 360 ML | Refills: 0 | Status: SHIPPED | OUTPATIENT
Start: 2024-11-21

## 2024-11-22 ENCOUNTER — HOME CARE VISIT (OUTPATIENT)
Dept: HOME HEALTH SERVICES | Facility: CLINIC | Age: 64
End: 2024-11-22
Payer: MEDICARE

## 2024-11-22 VITALS
HEART RATE: 116 BPM | OXYGEN SATURATION: 95 % | RESPIRATION RATE: 16 BRPM | TEMPERATURE: 98.6 F | DIASTOLIC BLOOD PRESSURE: 72 MMHG | SYSTOLIC BLOOD PRESSURE: 128 MMHG

## 2024-11-22 PROCEDURE — G0157 HHC PT ASSISTANT EA 15: HCPCS

## 2024-11-25 ENCOUNTER — HOME CARE VISIT (OUTPATIENT)
Dept: HOME HEALTH SERVICES | Facility: CLINIC | Age: 64
End: 2024-11-25
Payer: MEDICARE

## 2024-11-25 VITALS
TEMPERATURE: 98.9 F | DIASTOLIC BLOOD PRESSURE: 70 MMHG | OXYGEN SATURATION: 93 % | SYSTOLIC BLOOD PRESSURE: 118 MMHG | HEART RATE: 105 BPM | RESPIRATION RATE: 20 BRPM

## 2024-11-25 PROCEDURE — G0155 HHCP-SVS OF CSW,EA 15 MIN: HCPCS

## 2024-11-25 PROCEDURE — G0157 HHC PT ASSISTANT EA 15: HCPCS

## 2024-11-26 ENCOUNTER — HOSPITAL ENCOUNTER (EMERGENCY)
Facility: HOSPITAL | Age: 64
Discharge: HOME | End: 2024-11-26
Attending: FAMILY MEDICINE
Payer: OTHER MISCELLANEOUS

## 2024-11-26 ENCOUNTER — APPOINTMENT (OUTPATIENT)
Dept: GENERAL RADIOLOGY | Facility: HOSPITAL | Age: 64
End: 2024-11-26
Payer: OTHER MISCELLANEOUS

## 2024-11-26 ENCOUNTER — HOME CARE VISIT (OUTPATIENT)
Dept: HOME HEALTH SERVICES | Facility: CLINIC | Age: 64
End: 2024-11-26
Payer: MEDICARE

## 2024-11-26 ENCOUNTER — APPOINTMENT (OUTPATIENT)
Dept: CT IMAGING | Facility: HOSPITAL | Age: 64
End: 2024-11-26
Payer: OTHER MISCELLANEOUS

## 2024-11-26 VITALS
SYSTOLIC BLOOD PRESSURE: 102 MMHG | HEIGHT: 70 IN | TEMPERATURE: 97 F | OXYGEN SATURATION: 97 % | HEART RATE: 103 BPM | RESPIRATION RATE: 23 BRPM | BODY MASS INDEX: 22.12 KG/M2 | WEIGHT: 154.5 LBS | DIASTOLIC BLOOD PRESSURE: 64 MMHG

## 2024-11-26 DIAGNOSIS — J44.1 COPD EXACERBATION: ICD-10-CM

## 2024-11-26 DIAGNOSIS — K40.20 BILATERAL INGUINAL HERNIA WITHOUT OBSTRUCTION OR GANGRENE, RECURRENCE NOT SPECIFIED: ICD-10-CM

## 2024-11-26 DIAGNOSIS — R06.02 SHORTNESS OF BREATH: Primary | ICD-10-CM

## 2024-11-26 DIAGNOSIS — R79.89 ELEVATED TROPONIN: ICD-10-CM

## 2024-11-26 LAB
ANION GAP SERPL CALCULATED.3IONS-SCNC: 9 MMOL/L (ref 5–15)
BASOPHILS # BLD AUTO: 0.05 10*3/MM3 (ref 0–0.2)
BASOPHILS NFR BLD AUTO: 0.4 % (ref 0–1.5)
BUN SERPL-MCNC: 27 MG/DL (ref 8–23)
BUN/CREAT SERPL: 32.9 (ref 7–25)
CALCIUM SPEC-SCNC: 8.8 MG/DL (ref 8.6–10.5)
CHLORIDE SERPL-SCNC: 96 MMOL/L (ref 98–107)
CO2 SERPL-SCNC: 31 MMOL/L (ref 22–29)
CREAT SERPL-MCNC: 0.82 MG/DL (ref 0.76–1.27)
D DIMER PPP FEU-MCNC: 0.36 MCGFEU/ML (ref 0–0.66)
D-LACTATE SERPL-SCNC: 1.7 MMOL/L (ref 0.5–2)
DEPRECATED RDW RBC AUTO: 48.3 FL (ref 37–54)
EGFRCR SERPLBLD CKD-EPI 2021: 96.9 ML/MIN/1.73
EOSINOPHIL # BLD AUTO: 0.01 10*3/MM3 (ref 0–0.4)
EOSINOPHIL NFR BLD AUTO: 0.1 % (ref 0.3–6.2)
ERYTHROCYTE [DISTWIDTH] IN BLOOD BY AUTOMATED COUNT: 14.7 % (ref 12.3–15.4)
GEN 5 2HR TROPONIN T REFLEX: 111 NG/L
GLUCOSE SERPL-MCNC: 98 MG/DL (ref 65–99)
HCT VFR BLD AUTO: 44.7 % (ref 37.5–51)
HGB BLD-MCNC: 14.7 G/DL (ref 13–17.7)
IMM GRANULOCYTES # BLD AUTO: 0.3 10*3/MM3 (ref 0–0.05)
IMM GRANULOCYTES NFR BLD AUTO: 2.4 % (ref 0–0.5)
LYMPHOCYTES # BLD AUTO: 0.75 10*3/MM3 (ref 0.7–3.1)
LYMPHOCYTES NFR BLD AUTO: 5.9 % (ref 19.6–45.3)
MAGNESIUM SERPL-MCNC: 2.1 MG/DL (ref 1.6–2.4)
MCH RBC QN AUTO: 29.5 PG (ref 26.6–33)
MCHC RBC AUTO-ENTMCNC: 32.9 G/DL (ref 31.5–35.7)
MCV RBC AUTO: 89.8 FL (ref 79–97)
MONOCYTES # BLD AUTO: 0.79 10*3/MM3 (ref 0.1–0.9)
MONOCYTES NFR BLD AUTO: 6.2 % (ref 5–12)
NEUTROPHILS NFR BLD AUTO: 10.84 10*3/MM3 (ref 1.7–7)
NEUTROPHILS NFR BLD AUTO: 85 % (ref 42.7–76)
NRBC BLD AUTO-RTO: 0 /100 WBC (ref 0–0.2)
NT-PROBNP SERPL-MCNC: 550.5 PG/ML (ref 0–900)
PLATELET # BLD AUTO: 263 10*3/MM3 (ref 140–450)
PMV BLD AUTO: 8.5 FL (ref 6–12)
POTASSIUM SERPL-SCNC: 4.4 MMOL/L (ref 3.5–5.2)
RBC # BLD AUTO: 4.98 10*6/MM3 (ref 4.14–5.8)
SODIUM SERPL-SCNC: 136 MMOL/L (ref 136–145)
TROPONIN T DELTA: -26 NG/L
TROPONIN T SERPL HS-MCNC: 137 NG/L
WBC NRBC COR # BLD AUTO: 12.74 10*3/MM3 (ref 3.4–10.8)

## 2024-11-26 PROCEDURE — 85379 FIBRIN DEGRADATION QUANT: CPT | Performed by: FAMILY MEDICINE

## 2024-11-26 PROCEDURE — 84484 ASSAY OF TROPONIN QUANT: CPT | Performed by: FAMILY MEDICINE

## 2024-11-26 PROCEDURE — 85025 COMPLETE CBC W/AUTO DIFF WBC: CPT | Performed by: FAMILY MEDICINE

## 2024-11-26 PROCEDURE — 80048 BASIC METABOLIC PNL TOTAL CA: CPT | Performed by: FAMILY MEDICINE

## 2024-11-26 PROCEDURE — 93005 ELECTROCARDIOGRAM TRACING: CPT | Performed by: FAMILY MEDICINE

## 2024-11-26 PROCEDURE — 71045 X-RAY EXAM CHEST 1 VIEW: CPT

## 2024-11-26 PROCEDURE — 83605 ASSAY OF LACTIC ACID: CPT | Performed by: FAMILY MEDICINE

## 2024-11-26 PROCEDURE — 83880 ASSAY OF NATRIURETIC PEPTIDE: CPT | Performed by: FAMILY MEDICINE

## 2024-11-26 PROCEDURE — 74177 CT ABD & PELVIS W/CONTRAST: CPT

## 2024-11-26 PROCEDURE — 93010 ELECTROCARDIOGRAM REPORT: CPT | Performed by: INTERNAL MEDICINE

## 2024-11-26 PROCEDURE — 36415 COLL VENOUS BLD VENIPUNCTURE: CPT

## 2024-11-26 PROCEDURE — 25510000001 IOPAMIDOL 61 % SOLUTION: Performed by: FAMILY MEDICINE

## 2024-11-26 PROCEDURE — 83735 ASSAY OF MAGNESIUM: CPT | Performed by: FAMILY MEDICINE

## 2024-11-26 PROCEDURE — 99285 EMERGENCY DEPT VISIT HI MDM: CPT

## 2024-11-26 RX ORDER — ASPIRIN 81 MG/1
324 TABLET, CHEWABLE ORAL ONCE
Status: COMPLETED | OUTPATIENT
Start: 2024-11-26 | End: 2024-11-26

## 2024-11-26 RX ORDER — IOPAMIDOL 612 MG/ML
100 INJECTION, SOLUTION INTRAVASCULAR
Status: COMPLETED | OUTPATIENT
Start: 2024-11-26 | End: 2024-11-26

## 2024-11-26 RX ORDER — PREDNISONE 50 MG/1
50 TABLET ORAL DAILY
Qty: 5 TABLET | Refills: 0 | Status: SHIPPED | OUTPATIENT
Start: 2024-11-26 | End: 2024-11-26

## 2024-11-26 RX ORDER — PREDNISONE 50 MG/1
50 TABLET ORAL DAILY
Qty: 5 TABLET | Refills: 0 | Status: SHIPPED | OUTPATIENT
Start: 2024-11-26 | End: 2024-12-01

## 2024-11-26 RX ORDER — SODIUM CHLORIDE 0.9 % (FLUSH) 0.9 %
10 SYRINGE (ML) INJECTION AS NEEDED
Status: DISCONTINUED | OUTPATIENT
Start: 2024-11-26 | End: 2024-11-26 | Stop reason: HOSPADM

## 2024-11-26 RX ADMIN — ASPIRIN 81 MG CHEWABLE TABLET 324 MG: 81 TABLET CHEWABLE at 12:30

## 2024-11-26 RX ADMIN — IOPAMIDOL 100 ML: 612 INJECTION, SOLUTION INTRAVENOUS at 13:03

## 2024-11-26 NOTE — ED PROVIDER NOTES
Subjective   History of Present Illness  64-year-old male comes here from his primary care provider's office.  They sent him down here because he has an inguinal hernia on the right side.  It is reducible.  It has been there for over 1 month.  He states that he can massage it and it goes back away.  Patient denies any issues with his bowel movements.  Patient denies any fevers or chills.  He states that he also feels short of breath.  He states that he thinks it might be just due to some anxiety.  He also has a cough.  No chest pain.  Patient denies any other symptoms at this time.      Review of Systems   Respiratory:  Positive for cough and shortness of breath.    All other systems reviewed and are negative.      Past Medical History:   Diagnosis Date    Acute non-recurrent sinusitis 11/4/2024    Arthritis     Atrial fibrillation     COPD (chronic obstructive pulmonary disease)     Essential tremor     Lung nodule 2014    Pneumonia 2014    Pulmonary arterial hypertension 2021       Allergies   Allergen Reactions    Penicillins Other (See Comments)     Unknown         Past Surgical History:   Procedure Laterality Date    FINGER SURGERY      FOOT SURGERY         Family History   Problem Relation Age of Onset    Heart disease Mother     Heart disease Father     No Known Problems Sister     No Known Problems Brother     No Known Problems Maternal Aunt     No Known Problems Maternal Uncle     No Known Problems Paternal Aunt     No Known Problems Paternal Uncle     No Known Problems Maternal Grandmother     No Known Problems Maternal Grandfather     No Known Problems Paternal Grandmother     No Known Problems Paternal Grandfather     Asthma Neg Hx     Cancer Neg Hx     Diabetes Neg Hx     Emphysema Neg Hx     Heart failure Neg Hx     Hypertension Neg Hx        Social History     Socioeconomic History    Marital status:    Tobacco Use    Smoking status: Every Day     Current packs/day: 0.25     Average packs/day:  0.3 packs/day for 48.9 years (12.2 ttl pk-yrs)     Types: Cigarettes     Start date: 1/1/1976     Passive exposure: Current    Smokeless tobacco: Never    Tobacco comments:     He currently is smoking 4 to 5 cigarettes/day but in the past has smoked up to a pack of cigarettes per day and has well over 30-pack-year history of smoking.   Vaping Use    Vaping status: Never Used   Substance and Sexual Activity    Alcohol use: No    Drug use: No    Sexual activity: Yes     Partners: Female           Objective   Physical Exam  Vitals and nursing note reviewed.   Constitutional:       Appearance: He is well-developed.   HENT:      Head: Normocephalic and atraumatic.      Mouth/Throat:      Mouth: Mucous membranes are moist.   Eyes:      Extraocular Movements: Extraocular movements intact.      Pupils: Pupils are equal, round, and reactive to light.   Cardiovascular:      Rate and Rhythm: Normal rate and regular rhythm.      Heart sounds: Normal heart sounds.   Pulmonary:      Effort: Pulmonary effort is normal.      Breath sounds: Normal breath sounds.   Abdominal:      General: Bowel sounds are normal.      Palpations: Abdomen is soft. There is no mass.      Tenderness: There is no abdominal tenderness. There is no guarding or rebound.   Skin:     General: Skin is warm and dry.   Neurological:      General: No focal deficit present.      Mental Status: He is alert and oriented to person, place, and time.   Psychiatric:         Mood and Affect: Mood normal.         Behavior: Behavior normal.         Procedures           ED Course                                                       Medical Decision Making      Final diagnoses:   None       ED Disposition  ED Disposition       None            No follow-up provider specified.       Medication List      No changes were made to your prescriptions during this visit.          Sensitive Troponin T Reference Range:  <14.0 ng/L- Negative Female for AMI  <22.0 ng/L- Negative Male for AMI  >=14 - Abnormal Female indicating possible myocardial injury.  >=22 - Abnormal Male indicating possible myocardial injury.   Clinicians would have to utilize clinical acumen, EKG, Troponin, and serial changes to determine if it is an Acute Myocardial Infarction or myocardial injury due to an underlying chronic condition.        BASIC METABOLIC PANEL - Abnormal; Notable for the following components:    BUN 27 (*)     Chloride 96 (*)     CO2 31.0 (*)     BUN/Creatinine Ratio 32.9 (*)     All other components within normal limits    Narrative:     GFR Normal >60  Chronic Kidney Disease <60  Kidney Failure <15     CBC WITH AUTO DIFFERENTIAL - Abnormal; Notable for the following components:    WBC 12.74 (*)     Neutrophil % 85.0 (*)     Lymphocyte % 5.9 (*)     Eosinophil % 0.1 (*)     Immature Grans % 2.4 (*)     Neutrophils, Absolute 10.84 (*)     Immature Grans, Absolute 0.30 (*)     All other components within normal limits   HIGH SENSITIVITIY TROPONIN T 2HR - Abnormal; Notable for the following components:    HS Troponin T 111 (*)     Troponin T Delta -26 (*)     All other components within normal limits    Narrative:     High Sensitive Troponin T Reference Range:  <14.0 ng/L- Negative Female for AMI  <22.0 ng/L- Negative Male for AMI  >=14 - Abnormal Female indicating possible myocardial injury.  >=22 - Abnormal Male indicating possible myocardial injury.   Clinicians would have to utilize clinical acumen, EKG, Troponin, and serial changes to determine if it is an Acute Myocardial Infarction or myocardial injury due to an underlying chronic condition.        BNP (IN-HOUSE) - Normal    Narrative:     This assay is used as an aid in the diagnosis of individuals suspected of having heart failure. It can be used as an aid in the diagnosis of acute decompensated heart failure (ADHF) in patients presenting with  "signs and symptoms of ADHF to the emergency department (ED). In addition, NT-proBNP of <300 pg/mL indicates ADHF is not likely.    Age Range Result Interpretation  NT-proBNP Concentration (pg/mL:      <50             Positive            >450                   Gray                 300-450                    Negative             <300    50-75           Positive            >900                  Gray                300-900                  Negative            <300      >75             Positive            >1800                  Gray                300-1800                  Negative            <300   D-DIMER, QUANTITATIVE - Normal    Narrative:     According to the assay 's published package insert, a normal (<0.50 MCGFEU/mL) D-dimer result in conjunction with a non-high clinical probability assessment, excludes deep vein thrombosis (DVT) and pulmonary embolism (PE) with high sensitivity.    D-dimer values increase with age and this can make VTE exclusion of an older population difficult. To address this, the [employer] College [employer], based on best available evidence and recent guidelines, recommends that clinicians use age-adjusted D-dimer thresholds in patients greater than 50 years of age with: a) a low probability of PE who do not meet all Pulmonary Embolism Rule Out Criteria, or b) in those with intermediate probability of PE.   The formula for an age-adjusted D-dimer cut-off is \"age/100\".  For example, a 60 year old patient would have an age-adjusted cut-off of 0.60 MCGFEU/mL and an 80 year old 0.80 MCGFEU/mL.   MAGNESIUM - Normal   LACTIC ACID, PLASMA - Normal   CBC AND DIFFERENTIAL    Narrative:     The following orders were created for panel order CBC & Differential.  Procedure                               Abnormality         Status                     ---------                               -----------         ------                     CBC Auto Differential[[id number]]        Abnormal          "   Final result                 Please view results for these tests on the individual orders.     CT Abdomen Pelvis With Contrast   Final Result   1. Small bilateral fat-containing inguinal hernias, measuring   approximately 1.3 cm on the right and 2 cm on the left. There is mild   induration of the fat within the upper right inguinal canal which   suggests a mild degree of inflammation in this region. The tip of the   appendix is seen along the superior margin of the right inguinal ring,   without evidence of appendicitis.               This report was signed and finalized on 11/26/2024 1:18 PM by Dr. Preston Ramires MD.          XR Chest 1 View   Final Result   Diffuse emphysema with mild interstitial fibrosis, no acute   cardiopulmonary abnormality.       This report was signed and finalized on 11/26/2024 11:57 AM by Dr. Preston Ramires MD.            Medications   aspirin chewable tablet 324 mg (324 mg Oral Given 11/26/24 1230)   iopamidol (ISOVUE-300) 61 % injection 100 mL (100 mL Intravenous Given 11/26/24 1303)       Medical Decision Making      Final diagnoses:   Shortness of breath   COPD exacerbation   Elevated troponin   Bilateral inguinal hernia without obstruction or gangrene, recurrence not specified       ED Disposition  ED Disposition       ED Disposition   Discharge    Condition   Stable    Comment   --               Harry Fontaine MD  [address]  [address]  [place] [address]  [phone]    Schedule an appointment as soon as possible for a visit       Vladimir Spaulding MD  [address] [facility]  [address]  [place] [address]  [phone]    Schedule an appointment as soon as possible for a visit       [facility] EMERGENCY DEPARTMENT  [address] [facility] [id number] [phone]    As needed, If symptoms worsen    Annemarie Whitfield MD  [address] [facility] [address]  [place] [address]  [phone]    Schedule an appointment as soon as possible for a visit             Medication List        ASK your doctor about these medications      predniSONE 50 MG tablet  Commonly known as: DELTASONE  Take 1 tablet by mouth Daily for 5 days.  Ask about: Should I take this medication?               Where to Get Your Medications        These medications were sent to [facility] [address] - [place] - [address] - [phone]  - [phone]   [address] [facility] [address]      Phone: [phone]   predniSONE 50 MG tablet            Amor Ochoa MD  12/03/24 9371

## 2024-11-26 NOTE — CASE COMMUNICATION
Patient missed a ST EVALUATION visit from [facility] on 11/26/2024    Reason: PT. CALLED TO CANCEL ST EVALUATION THIS WEEK DUE TO NEW MEDICAL CONDITION AND PT. AT MD APPT'S.      For your records only.   Per CMS Guidance, MD must be notified of missed/cancelled visits; therefore the prescribed frequency was not met.

## 2024-11-27 LAB
QT INTERVAL: 344 MS
QTC INTERVAL: 427 MS

## 2024-12-02 ENCOUNTER — HOME CARE VISIT (OUTPATIENT)
Dept: HOME HEALTH SERVICES | Facility: CLINIC | Age: 64
End: 2024-12-02
Payer: MEDICARE

## 2024-12-02 VITALS
HEART RATE: 122 BPM | DIASTOLIC BLOOD PRESSURE: 67 MMHG | SYSTOLIC BLOOD PRESSURE: 111 MMHG | TEMPERATURE: 98.9 F | OXYGEN SATURATION: 94 % | RESPIRATION RATE: 24 BRPM

## 2024-12-02 PROCEDURE — G0157 HHC PT ASSISTANT EA 15: HCPCS

## 2024-12-03 ENCOUNTER — HOME CARE VISIT (OUTPATIENT)
Dept: HOME HEALTH SERVICES | Facility: CLINIC | Age: 64
End: 2024-12-03
Payer: MEDICARE

## 2024-12-03 PROCEDURE — G0155 HHCP-SVS OF CSW,EA 15 MIN: HCPCS

## 2024-12-05 ENCOUNTER — HOME CARE VISIT (OUTPATIENT)
Dept: HOME HEALTH SERVICES | Facility: CLINIC | Age: 64
End: 2024-12-05
Payer: MEDICARE

## 2024-12-05 VITALS
HEART RATE: 102 BPM | TEMPERATURE: 97.2 F | OXYGEN SATURATION: 97 % | DIASTOLIC BLOOD PRESSURE: 80 MMHG | RESPIRATION RATE: 16 BRPM | SYSTOLIC BLOOD PRESSURE: 100 MMHG

## 2024-12-05 PROCEDURE — G0151 HHCP-SERV OF PT,EA 15 MIN: HCPCS

## 2024-12-05 NOTE — HOME HEALTH
Patient reports he is going to have hernia surgery in the next few weeks.  Patient reports he is ready to discharge from home health services.

## 2024-12-10 ENCOUNTER — OFFICE VISIT (OUTPATIENT)
Dept: CARDIOLOGY | Facility: CLINIC | Age: 64
End: 2024-12-10
Payer: MEDICARE

## 2024-12-10 VITALS
HEIGHT: 70 IN | OXYGEN SATURATION: 97 % | SYSTOLIC BLOOD PRESSURE: 108 MMHG | BODY MASS INDEX: 23.48 KG/M2 | HEART RATE: 117 BPM | DIASTOLIC BLOOD PRESSURE: 60 MMHG | WEIGHT: 164 LBS

## 2024-12-10 DIAGNOSIS — Z01.818 PREOPERATIVE EVALUATION TO RULE OUT SURGICAL CONTRAINDICATION: ICD-10-CM

## 2024-12-10 DIAGNOSIS — Z79.01 CHRONIC ANTICOAGULATION: ICD-10-CM

## 2024-12-10 DIAGNOSIS — I48.11 LONGSTANDING PERSISTENT ATRIAL FIBRILLATION: Primary | ICD-10-CM

## 2024-12-10 DIAGNOSIS — R60.0 BILATERAL LOWER EXTREMITY EDEMA: ICD-10-CM

## 2024-12-10 PROBLEM — J44.1 COPD WITH ACUTE EXACERBATION: Status: RESOLVED | Noted: 2021-03-04 | Resolved: 2024-12-10

## 2024-12-10 PROBLEM — E66.3 OVERWEIGHT: Status: RESOLVED | Noted: 2021-06-28 | Resolved: 2024-12-10

## 2024-12-10 RX ORDER — METOPROLOL TARTRATE 25 MG/1
25 TABLET, FILM COATED ORAL 2 TIMES DAILY
Qty: 60 TABLET | Refills: 11 | Status: SHIPPED | OUTPATIENT
Start: 2024-12-10

## 2024-12-10 RX ORDER — MUPIROCIN CALCIUM 20 MG/G
1 CREAM TOPICAL 3 TIMES DAILY
COMMUNITY

## 2024-12-10 NOTE — LETTER
December 10, 2024     Harry Fontaine MD  [address]  [facility] [address]  [facility] [address]    Patient: Bin Oh   YOB: 1960   Date of Visit: 12/10/2024       Dear Harry Fontaine MD,    Thank you for referring Bin Oh to me for evaluation. Below is a copy of my consult note.    If you have questions, please do not hesitate to call me. I look forward to following Bin along with you.         Sincerely,        Lenny Simmons MD        CC: No Recipients      Reason for Visit: tachycardia.    HPI:  Bin Oh is a 64 y.o. male is being seen for consultation today at the request of Harry Fontaine MD due to tachycardia.  He recently was discovered to have a inguinal hernia.  He is planning on surgical repair with Dr. Whitfield.  He has had more difficulty controlling his heart rate recently.  He previously saw Dr Martin, but has not been seen in years.  He has been in a lot of pain related to the hernia, which ne notes has driven up his heart rate.  He has an upcoming appointment to see Dr. Chong of EP at [facility] on 1/6/2024 in [place].  He has been complaint with Xarelto.  He has had a lot of leg swelling recently.  This started after his prednisone was increased.  He is trying to cut back on smoking.      Previous Cardiac Testing and Procedures:  -Stress echo (4/6/2015) low risk for ischemia  -Holter monitor (7/3/2018) atrial fibrillation with an average heart rate of 63 bpm, no significant pauses, rare PVCs  -Echo (5/22/2023) EF 65%, mild LVH, diastolic dysfunction, mildly reduced RV systolic function, mild to moderate RV dilation, mild LA dilation  -EKG (11/26/2024) atrial fibrillation, heart rate 93 bpm, LAFB    Patient Active Problem List   Diagnosis   • Shortness of breath   • Nocturnal hypoxemia   • COPD, severe   • Scarring of lung   • Cigarette nicotine dependence without complication   • Lung nodules   • Hypoxemia requiring supplemental oxygen   • Longstanding  persistent atrial fibrillation   • Bronchitis   • Centrilobular emphysema   • Cutaneous abscess of buttock   • Perianal abscess   • Acute non-recurrent sinusitis   • Chronic anticoagulation       Social History     Tobacco Use   • Smoking status: Former     Current packs/day: 0.00     Average packs/day: 0.3 packs/day for 48.9 years (12.2 ttl pk-yrs)     Types: Cigarettes     Start date: 1976     Quit date: 2024     Years since quittin.0     Passive exposure: Current   • Smokeless tobacco: Never   • Tobacco comments:     He currently is smoking 4 to 5 cigarettes/day but in the past has smoked up to a pack of cigarettes per day and has well over 30-pack-year history of smoking.   Vaping Use   • Vaping status: Never Used   Substance Use Topics   • Alcohol use: No   • Drug use: No       Family History   Problem Relation Age of Onset   • Heart disease Mother    • Heart disease Father    • Heart disease Sister    • No Known Problems Brother    • No Known Problems Maternal Aunt    • No Known Problems Maternal Uncle    • No Known Problems Paternal Aunt    • No Known Problems Paternal Uncle    • No Known Problems Maternal Grandmother    • No Known Problems Maternal Grandfather    • No Known Problems Paternal Grandmother    • No Known Problems Paternal Grandfather    • Asthma Neg Hx    • Cancer Neg Hx    • Diabetes Neg Hx    • Emphysema Neg Hx    • Heart failure Neg Hx    • Hypertension Neg Hx        The following portions of the patient's history were reviewed and updated as appropriate: allergies, current medications, past family history, past medical history, past social history, past surgical history, and problem list.      Current Outpatient Medications:   •  albuterol (PROVENTIL) (2.5 MG/3ML) 0.083% nebulizer solution, TAKE 2.5 MG BY NEBULIZATION EVERY 4 (FOUR) HOURS AS NEEDED FOR WHEEZING., Disp: 360 mL, Rfl: 0  •  albuterol sulfate  (90 Base) MCG/ACT inhaler, INHALE 2 PUFFS EVERY 4 (FOUR) HOURS.,  Disp: 25.5 g, Rfl: 11  •  benzonatate (TESSALON) 200 MG capsule, Take 1 capsule by mouth 2 (Two) Times a Day As Needed for Cough. Indications: Cough, Disp: , Rfl:   •  budesonide-formoterol (SYMBICORT) 160-4.5 MCG/ACT inhaler, Inhale 2 puffs 2 (Two) Times a Day. Rinse and spit after using., Disp: 2 inhaler, Rfl: 0  •  Cyanocobalamin (Vitamin B-12) 5000 MCG sublingual tablet, Take 1 tablet by mouth Daily. Indications: Inadequate Vitamin B12, Disp: , Rfl:   •  diazePAM (VALIUM) 10 MG tablet, Take 1 tablet by mouth Every 6 (Six) Hours As Needed for Anxiety. Indications: Feeling Anxious, Disp: , Rfl:   •  dilTIAZem HCl Coated Beads (CARDIZEM CD PO), Take 360 mg by mouth Daily. Indications: Decreased Blood Pressure, Disp: , Rfl:   •  furosemide (LASIX) 20 MG tablet, Take 1 tablet by mouth Daily. Indications: Edema, Disp: , Rfl:   •  megestrol (MEGACE) 40 MG/ML suspension, Take 5 mL by mouth Daily. Indications: General Weight Loss and Wasting, Disp: , Rfl:   •  mupirocin (BACTROBAN) 2 % cream, Apply 1 Application topically to the appropriate area as directed 3 (Three) Times a Day., Disp: , Rfl:   •  O2 (OXYGEN), Inhale 3 L/min At Night As Needed (shortness of breath). Indications: Respiratory Failure, Disp: , Rfl:   •  POTASSIUM GLUCONATE PO, Take 1 tablet/day by mouth Daily. Indications: Cardiac Failure, Disp: , Rfl:   •  predniSONE (DELTASONE) 10 MG tablet, Take 1 tablet by mouth Daily. with food, Disp: 90 tablet, Rfl: 3  •  rivaroxaban (XARELTO) 20 MG tablet, Take 1 tablet by mouth Daily. Indications: Atrial Fibrillation, Disp: , Rfl:   •  tamsulosin (Flomax) 0.4 MG capsule 24 hr capsule, Take 1 capsule by mouth Daily. Indications: Chronic Prostate Gland Inflammation, Disp: , Rfl:   •  tiotropium bromide monohydrate (SPIRIVA RESPIMAT) 2.5 MCG/ACT aerosol solution inhaler, Inhale 2 puffs Daily., Disp: , Rfl:   •  traZODone (DESYREL) 50 MG tablet, Take 1 tablet by mouth Every Night. Indications: Trouble Sleeping, Disp: ,  "Rfl:   •  varenicline (CHANTIX) 0.5 MG tablet, Take 2 tablets by mouth Daily. Indications: Treatment to Stop Smoking, Disp: , Rfl:   •  metoprolol tartrate (LOPRESSOR) 25 MG tablet, Take 1 tablet by mouth 2 (Two) Times a Day., Disp: 60 tablet, Rfl: 11  •  sildenafil (VIAGRA) 100 MG tablet, Take 1 tablet by mouth Daily As Needed for Erectile Dysfunction., Disp: , Rfl:     Review of Systems   Constitutional: Negative for chills and fever.   Cardiovascular:  Positive for leg swelling. Negative for chest pain, palpitations and paroxysmal nocturnal dyspnea.   Respiratory:  Positive for shortness of breath. Negative for cough.    Skin:  Negative for rash.   Gastrointestinal:  Negative for abdominal pain and heartburn.        Inguinal hernia pain   Neurological:  Negative for dizziness and numbness.       Objective  /60 (BP Location: Left arm, Patient Position: Sitting, Cuff Size: Adult)   Pulse 117   Ht 177.8 cm (70\")   Wt 74.4 kg (164 lb)   SpO2 97%   BMI 23.53 kg/m²   Constitutional:       Appearance: Well-developed.   HENT:      Head: Normocephalic and atraumatic.   Pulmonary:      Effort: Pulmonary effort is normal.      Breath sounds: Decreased breath sounds present.   Cardiovascular:      Tachycardia present. Irregularly irregular rhythm.      Murmurs: There is no murmur.   Edema:     Peripheral edema present.     Pretibial: bilateral 3+ edema of the pretibial area.     Ankle: bilateral 3+ edema of the ankle.  Skin:     General: Skin is warm and dry.   Neurological:      Mental Status: Alert and oriented to person, place, and time.       Procedures      ICD-10-CM ICD-9-CM   1. Longstanding persistent atrial fibrillation  I48.11 427.31   2. Chronic anticoagulation  Z79.01 V58.61   3. Bilateral lower extremity edema  R60.0 782.3   4. Preoperative evaluation to rule out surgical contraindication  Z01.818 V72.83         Assessment/Plan:  1.  Persistent atrial fibrillation: Patient reports this has been " present since 2014.  Recent tachycardia is likely being driven by pain from inguinal hernia.  He has an upcoming appointment in EP clinic at [facility] on 1/6/2024.  Rhythm control options are likely limited given the very long duration of his atrial fibrillation.  Continue diltiazem and Xarelto.  Start metoprolol to help with rate control.  Check an echo.    2.  Chronic anticoagulation: Continue Xarelto.    3.  Lower extremity edema: Patient has moderate to severe bilateral lower extremity edema.  He is currently wearing compression stockings that appear to be on the light side.  He was also taking Lasix 20 mg daily.  Previous echo on 5/22/2023 showed normal left ventricular function with EF of 65%, mild LVH, diastolic dysfunction, mild to moderate RV dilation and mildly reduced RV systolic function.  This raises concern for right heart failure that is likely being driven by his severe COPD.  Encouraged him to get stronger compression stockings.  Repeat echo ordered.    4.  Preoperative evaluation: Patient has an inguinal hernia that he reports causes significant discomfort.  He has an appointment in surgery clinic tomorrow to discuss further options.  He is an intermediate risk surgical candidate from a cardiac standpoint.  Heart rate is mildly elevated at metoprolol is added to help with heart rate control.  Echo was ordered for further cardiac evaluation but does not necessarily need to delay the surgery.  Xarelto can be held for 48 to 72 hours prior to surgery and resume to soon as safe afterwards.

## 2024-12-10 NOTE — PROGRESS NOTES
Reason for Visit: tachycardia.    HPI:  Bin hO is a 64 y.o. male is being seen for consultation today at the request of Harry Fontaine MD due to tachycardia.  He recently was discovered to have a inguinal hernia.  He is planning on surgical repair with Dr. Whitfield.  He has had more difficulty controlling his heart rate recently.  He previously saw Dr Martin, but has not been seen in years.  He has been in a lot of pain related to the hernia, which ne notes has driven up his heart rate.  He has an upcoming appointment to see Dr. Chong of EP at [facility] on 2024 in [place].  He has been complaint with Xarelto.  He has had a lot of leg swelling recently.  This started after his prednisone was increased.  He is trying to cut back on smoking.      Previous Cardiac Testing and Procedures:  -Stress echo (2015) low risk for ischemia  -Holter monitor (7/3/2018) atrial fibrillation with an average heart rate of 63 bpm, no significant pauses, rare PVCs  -Echo (2023) EF 65%, mild LVH, diastolic dysfunction, mildly reduced RV systolic function, mild to moderate RV dilation, mild LA dilation  -EKG (2024) atrial fibrillation, heart rate 93 bpm, LAFB    Patient Active Problem List   Diagnosis    Shortness of breath    Nocturnal hypoxemia    COPD, severe    Scarring of lung    Cigarette nicotine dependence without complication    Lung nodules    Hypoxemia requiring supplemental oxygen    Longstanding persistent atrial fibrillation    Bronchitis    Centrilobular emphysema    Cutaneous abscess of buttock    Perianal abscess    Acute non-recurrent sinusitis    Chronic anticoagulation       Social History     Tobacco Use    Smoking status: Former     Current packs/day: 0.00     Average packs/day: 0.3 packs/day for 48.9 years (12.2 ttl pk-yrs)     Types: Cigarettes     Start date: 1976     Quit date: 2024     Years since quittin.0     Passive exposure: Current    Smokeless  tobacco: Never    Tobacco comments:     He currently is smoking 4 to 5 cigarettes/day but in the past has smoked up to a pack of cigarettes per day and has well over 30-pack-year history of smoking.   Vaping Use    Vaping status: Never Used   Substance Use Topics    Alcohol use: No    Drug use: No       Family History   Problem Relation Age of Onset    Heart disease Mother     Heart disease Father     Heart disease Sister     No Known Problems Brother     No Known Problems Maternal Aunt     No Known Problems Maternal Uncle     No Known Problems Paternal Aunt     No Known Problems Paternal Uncle     No Known Problems Maternal Grandmother     No Known Problems Maternal Grandfather     No Known Problems Paternal Grandmother     No Known Problems Paternal Grandfather     Asthma Neg Hx     Cancer Neg Hx     Diabetes Neg Hx     Emphysema Neg Hx     Heart failure Neg Hx     Hypertension Neg Hx        The following portions of the patient's history were reviewed and updated as appropriate: allergies, current medications, past family history, past medical history, past social history, past surgical history, and problem list.      Current Outpatient Medications:     albuterol (PROVENTIL) (2.5 MG/3ML) 0.083% nebulizer solution, TAKE 2.5 MG BY NEBULIZATION EVERY 4 (FOUR) HOURS AS NEEDED FOR WHEEZING., Disp: 360 mL, Rfl: 0    albuterol sulfate  (90 Base) MCG/ACT inhaler, INHALE 2 PUFFS EVERY 4 (FOUR) HOURS., Disp: 25.5 g, Rfl: 11    benzonatate (TESSALON) 200 MG capsule, Take 1 capsule by mouth 2 (Two) Times a Day As Needed for Cough. Indications: Cough, Disp: , Rfl:     budesonide-formoterol (SYMBICORT) 160-4.5 MCG/ACT inhaler, Inhale 2 puffs 2 (Two) Times a Day. Rinse and spit after using., Disp: 2 inhaler, Rfl: 0    Cyanocobalamin (Vitamin B-12) 5000 MCG sublingual tablet, Take 1 tablet by mouth Daily. Indications: Inadequate Vitamin B12, Disp: , Rfl:     diazePAM (VALIUM) 10 MG tablet, Take 1 tablet by mouth Every 6  (Six) Hours As Needed for Anxiety. Indications: Feeling Anxious, Disp: , Rfl:     dilTIAZem HCl Coated Beads (CARDIZEM CD PO), Take 360 mg by mouth Daily. Indications: Decreased Blood Pressure, Disp: , Rfl:     furosemide (LASIX) 20 MG tablet, Take 1 tablet by mouth Daily. Indications: Edema, Disp: , Rfl:     megestrol (MEGACE) 40 MG/ML suspension, Take 5 mL by mouth Daily. Indications: General Weight Loss and Wasting, Disp: , Rfl:     mupirocin (BACTROBAN) 2 % cream, Apply 1 Application topically to the appropriate area as directed 3 (Three) Times a Day., Disp: , Rfl:     O2 (OXYGEN), Inhale 3 L/min At Night As Needed (shortness of breath). Indications: Respiratory Failure, Disp: , Rfl:     POTASSIUM GLUCONATE PO, Take 1 tablet/day by mouth Daily. Indications: Cardiac Failure, Disp: , Rfl:     predniSONE (DELTASONE) 10 MG tablet, Take 1 tablet by mouth Daily. with food, Disp: 90 tablet, Rfl: 3    rivaroxaban (XARELTO) 20 MG tablet, Take 1 tablet by mouth Daily. Indications: Atrial Fibrillation, Disp: , Rfl:     tamsulosin (Flomax) 0.4 MG capsule 24 hr capsule, Take 1 capsule by mouth Daily. Indications: Chronic Prostate Gland Inflammation, Disp: , Rfl:     tiotropium bromide monohydrate (SPIRIVA RESPIMAT) 2.5 MCG/ACT aerosol solution inhaler, Inhale 2 puffs Daily., Disp: , Rfl:     traZODone (DESYREL) 50 MG tablet, Take 1 tablet by mouth Every Night. Indications: Trouble Sleeping, Disp: , Rfl:     varenicline (CHANTIX) 0.5 MG tablet, Take 2 tablets by mouth Daily. Indications: Treatment to Stop Smoking, Disp: , Rfl:     metoprolol tartrate (LOPRESSOR) 25 MG tablet, Take 1 tablet by mouth 2 (Two) Times a Day., Disp: 60 tablet, Rfl: 11    sildenafil (VIAGRA) 100 MG tablet, Take 1 tablet by mouth Daily As Needed for Erectile Dysfunction., Disp: , Rfl:     Review of Systems   Constitutional: Negative for chills and fever.   Cardiovascular:  Positive for leg swelling. Negative for chest pain, palpitations and paroxysmal  "nocturnal dyspnea.   Respiratory:  Positive for shortness of breath. Negative for cough.    Skin:  Negative for rash.   Gastrointestinal:  Negative for abdominal pain and heartburn.        Inguinal hernia pain   Neurological:  Negative for dizziness and numbness.       Objective   /60 (BP Location: Left arm, Patient Position: Sitting, Cuff Size: Adult)   Pulse 117   Ht 177.8 cm (70\")   Wt 74.4 kg (164 lb)   SpO2 97%   BMI 23.53 kg/m²   Constitutional:       Appearance: Well-developed.   HENT:      Head: Normocephalic and atraumatic.   Pulmonary:      Effort: Pulmonary effort is normal.      Breath sounds: Decreased breath sounds present.   Cardiovascular:      Tachycardia present. Irregularly irregular rhythm.      Murmurs: There is no murmur.   Edema:     Peripheral edema present.     Pretibial: bilateral 3+ edema of the pretibial area.     Ankle: bilateral 3+ edema of the ankle.  Skin:     General: Skin is warm and dry.   Neurological:      Mental Status: Alert and oriented to person, place, and time.         ECG 12 Lead    Date/Time: 12/10/2024 3:48 PM  Performed by: Lenny Simmons MD    Authorized by: Lenny Simmons MD  Comparison: compared with previous ECG from 11/26/2024  Comparison to previous ECG: Heart rate has increased  Rhythm: atrial fibrillation  Rate: tachycardic  Other findings: non-specific ST-T wave changes  Other findings comments: artifact, RVH            ICD-10-CM ICD-9-CM   1. Longstanding persistent atrial fibrillation  I48.11 427.31   2. Chronic anticoagulation  Z79.01 V58.61   3. Bilateral lower extremity edema  R60.0 782.3   4. Preoperative evaluation to rule out surgical contraindication  Z01.818 V72.83         Assessment/Plan:  1.  Persistent atrial fibrillation: Patient reports this has been present since 2014.  Recent tachycardia is likely being driven by pain from inguinal hernia.  He has an upcoming appointment in EP clinic at [facility] on 1/6/2024.  Rhythm " control options are likely limited given the very long duration of his atrial fibrillation.  Continue diltiazem and Xarelto.  Start metoprolol to help with rate control.  Check an echo.    2.  Chronic anticoagulation: Continue Xarelto.    3.  Lower extremity edema: Patient has moderate to severe bilateral lower extremity edema.  He is currently wearing compression stockings that appear to be on the light side.  He was also taking Lasix 20 mg daily.  Previous echo on 5/22/2023 showed normal left ventricular function with EF of 65%, mild LVH, diastolic dysfunction, mild to moderate RV dilation and mildly reduced RV systolic function.  This raises concern for right heart failure that is likely being driven by his severe COPD.  Encouraged him to get stronger compression stockings.  Repeat echo ordered.    4.  Preoperative evaluation: Patient has an inguinal hernia that he reports causes significant discomfort.  He has an appointment in surgery clinic tomorrow to discuss further options.  He is an intermediate risk surgical candidate from a cardiac standpoint.  Heart rate is mildly elevated at metoprolol is added to help with heart rate control.  Echo was ordered for further cardiac evaluation but does not necessarily need to delay the surgery.  Xarelto can be held for 48 to 72 hours prior to surgery and resume to soon as safe afterwards.

## 2024-12-11 ENCOUNTER — OFFICE VISIT (OUTPATIENT)
Dept: SURGERY | Facility: CLINIC | Age: 64
End: 2024-12-11
Payer: MEDICARE

## 2024-12-11 ENCOUNTER — PATIENT ROUNDING (BHMG ONLY) (OUTPATIENT)
Dept: SURGERY | Facility: CLINIC | Age: 64
End: 2024-12-11
Payer: MEDICARE

## 2024-12-11 VITALS
DIASTOLIC BLOOD PRESSURE: 66 MMHG | SYSTOLIC BLOOD PRESSURE: 111 MMHG | BODY MASS INDEX: 23.48 KG/M2 | WEIGHT: 164 LBS | HEIGHT: 70 IN | OXYGEN SATURATION: 90 % | HEART RATE: 99 BPM

## 2024-12-11 DIAGNOSIS — K40.20 NON-RECURRENT BILATERAL INGUINAL HERNIA WITHOUT OBSTRUCTION OR GANGRENE: Primary | ICD-10-CM

## 2024-12-11 DIAGNOSIS — Z79.01 CHRONIC ANTICOAGULATION: ICD-10-CM

## 2024-12-11 PROCEDURE — 1159F MED LIST DOCD IN RCRD: CPT | Performed by: STUDENT IN AN ORGANIZED HEALTH CARE EDUCATION/TRAINING PROGRAM

## 2024-12-11 PROCEDURE — 99204 OFFICE O/P NEW MOD 45 MIN: CPT | Performed by: STUDENT IN AN ORGANIZED HEALTH CARE EDUCATION/TRAINING PROGRAM

## 2024-12-11 PROCEDURE — 1160F RVW MEDS BY RX/DR IN RCRD: CPT | Performed by: STUDENT IN AN ORGANIZED HEALTH CARE EDUCATION/TRAINING PROGRAM

## 2024-12-11 RX ORDER — HEPARIN SODIUM 5000 [USP'U]/ML
5000 INJECTION, SOLUTION INTRAVENOUS; SUBCUTANEOUS EVERY 8 HOURS SCHEDULED
OUTPATIENT
Start: 2024-12-11

## 2024-12-11 NOTE — H&P (VIEW-ONLY)
Office New Patient History and Physical:     Referring Provider: Jacobo Bowens APRN    Chief Complaint   Patient presents with    Hernia       Subjective .     History of present illness:    History of Present Illness  The patient presents for evaluation of bilateral hernias.    He reports the presence of a right-sided hernia, which he believes originated on 10/24/2024. A CT scan conducted during an emergency room visit revealed the existence of two hernias, with the left-sided hernia currently asymptomatic. He has no history of abdominal surgery. He has been abstaining from smoking for the past 25 days. He has a daily nurse who assists him. He has been managing the discomfort with nightly heating pad use and massage therapy.    He is currently on Xarelto for stroke prevention due to his atrial fibrillation. He had a consultation with his cardiologist, Dr. Jose, yesterday, who expressed concern about his elevated heart rate, typically ranging between 130 and 140 beats per minute. He was prescribed a new medication, which has successfully reduced his heart rate. Dr. Jose has given clearance for the hernia surgery and advised him to temporarily discontinue Xarelto. He is scheduled for a follow-up appointment with Dr. Jose on Tuesday for further heart rate monitoring. He is also planning a visit to [place] to assess his eligibility for ablation, although Dr. Jose does not believe it is necessary at this time.    He was previously on a 10 mg dose of prednisone, prescribed by his pulmonologist to aid in breathing. However, since ceasing smoking, his respiratory function has improved significantly. During an emergency room visit, he was administered a 50 mg dose of prednisone, which resulted in significant swelling. He did not take his prednisone dose this morning. He is also on several inhalers.    Supplemental Information  He was advised to discontinue prednisone and Lasix due to potential side effects, including leg  swelling and dehydration, respectively. He did not take Lasix today.    SOCIAL HISTORY  He has quit smoking and has not had a cigarette in 25 days.    MEDICATIONS  Current: Xarelto, prednisone, Lasix    History  Past Medical History:   Diagnosis Date    Acute non-recurrent sinusitis 2024    Arrhythmia     Arthritis     Asthma     Atrial fibrillation     COPD (chronic obstructive pulmonary disease)     Essential tremor     Lung nodule     Pneumonia     Pulmonary arterial hypertension    ,   Past Surgical History:   Procedure Laterality Date    FINGER SURGERY      FOOT SURGERY     ,   Family History   Problem Relation Age of Onset    Heart disease Mother     Heart disease Father     Heart disease Sister     No Known Problems Brother     No Known Problems Maternal Aunt     No Known Problems Maternal Uncle     No Known Problems Paternal Aunt     No Known Problems Paternal Uncle     No Known Problems Maternal Grandmother     No Known Problems Maternal Grandfather     No Known Problems Paternal Grandmother     No Known Problems Paternal Grandfather     Asthma Neg Hx     Cancer Neg Hx     Diabetes Neg Hx     Emphysema Neg Hx     Heart failure Neg Hx     Hypertension Neg Hx    ,   Social History     Tobacco Use    Smoking status: Former     Current packs/day: 0.00     Average packs/day: 0.3 packs/day for 48.9 years (12.2 ttl pk-yrs)     Types: Cigarettes     Start date: 1976     Quit date: 2024     Years since quittin.0     Passive exposure: Current    Smokeless tobacco: Never    Tobacco comments:     He currently is smoking 4 to 5 cigarettes/day but in the past has smoked up to a pack of cigarettes per day and has well over 30-pack-year history of smoking.   Vaping Use    Vaping status: Never Used   Substance Use Topics    Alcohol use: No    Drug use: No   , (Not in a hospital admission)   and Allergies:  Penicillins    Current Outpatient Medications:     albuterol (PROVENTIL) (2.5  MG/3ML) 0.083% nebulizer solution, TAKE 2.5 MG BY NEBULIZATION EVERY 4 (FOUR) HOURS AS NEEDED FOR WHEEZING., Disp: 360 mL, Rfl: 0    albuterol sulfate  (90 Base) MCG/ACT inhaler, INHALE 2 PUFFS EVERY 4 (FOUR) HOURS., Disp: 25.5 g, Rfl: 11    benzonatate (TESSALON) 200 MG capsule, Take 1 capsule by mouth 2 (Two) Times a Day As Needed for Cough. Indications: Cough, Disp: , Rfl:     budesonide-formoterol (SYMBICORT) 160-4.5 MCG/ACT inhaler, Inhale 2 puffs 2 (Two) Times a Day. Rinse and spit after using., Disp: 2 inhaler, Rfl: 0    Cyanocobalamin (Vitamin B-12) 5000 MCG sublingual tablet, Take 1 tablet by mouth Daily. Indications: Inadequate Vitamin B12, Disp: , Rfl:     diazePAM (VALIUM) 10 MG tablet, Take 1 tablet by mouth Every 6 (Six) Hours As Needed for Anxiety. Indications: Feeling Anxious, Disp: , Rfl:     dilTIAZem HCl Coated Beads (CARDIZEM CD PO), Take 360 mg by mouth Daily. Indications: Decreased Blood Pressure, Disp: , Rfl:     furosemide (LASIX) 20 MG tablet, Take 1 tablet by mouth Daily. Indications: Edema, Disp: , Rfl:     megestrol (MEGACE) 40 MG/ML suspension, Take 5 mL by mouth Daily. Indications: General Weight Loss and Wasting, Disp: , Rfl:     metoprolol tartrate (LOPRESSOR) 25 MG tablet, Take 1 tablet by mouth 2 (Two) Times a Day., Disp: 60 tablet, Rfl: 11    mupirocin (BACTROBAN) 2 % cream, Apply 1 Application topically to the appropriate area as directed 3 (Three) Times a Day., Disp: , Rfl:     O2 (OXYGEN), Inhale 3 L/min At Night As Needed (shortness of breath). Indications: Respiratory Failure, Disp: , Rfl:     POTASSIUM GLUCONATE PO, Take 1 tablet/day by mouth Daily. Indications: Cardiac Failure, Disp: , Rfl:     predniSONE (DELTASONE) 10 MG tablet, Take 1 tablet by mouth Daily. with food, Disp: 90 tablet, Rfl: 3    rivaroxaban (XARELTO) 20 MG tablet, Take 1 tablet by mouth Daily. Indications: Atrial Fibrillation, Disp: , Rfl:     sildenafil (VIAGRA) 100 MG tablet, Take 1 tablet by  "mouth Daily As Needed for Erectile Dysfunction., Disp: , Rfl:     tamsulosin (Flomax) 0.4 MG capsule 24 hr capsule, Take 1 capsule by mouth Daily. Indications: Chronic Prostate Gland Inflammation, Disp: , Rfl:     tiotropium bromide monohydrate (SPIRIVA RESPIMAT) 2.5 MCG/ACT aerosol solution inhaler, Inhale 2 puffs Daily., Disp: , Rfl:     traZODone (DESYREL) 50 MG tablet, Take 1 tablet by mouth Every Night. Indications: Trouble Sleeping, Disp: , Rfl:     varenicline (CHANTIX) 0.5 MG tablet, Take 2 tablets by mouth Daily. Indications: Treatment to Stop Smoking, Disp: , Rfl:     Review of Systems    Review of Systems - General ROS: negative  ENT ROS: negative  Respiratory ROS: no cough, shortness of breath, or wheezing  Cardiovascular ROS: no chest pain or dyspnea on exertion  Gastrointestinal ROS: bilateral inguinal hernias   Genito-Urinary ROS: no dysuria, trouble voiding, or hematuria  Dermatological ROS: negative   Breast ROS: negative for breast lumps  Hematological and Lymphatic ROS: negative  Musculoskeletal ROS: negative   Neurological ROS: no TIA or stroke symptoms    Psychological ROS: negative  Endocrine ROS: negative    Objective     Vital Signs   /66   Pulse 99   Ht 177.8 cm (70\")   Wt 74.4 kg (164 lb)   SpO2 90%   BMI 23.53 kg/m²      Physical Exam:  General appearance - alert, well appearing, and in no distress  Mental status - alert, oriented to person, place, and time  Eyes - pupils equal and reactive, extraocular eye movements intact  Neck - supple, no significant adenopathy  Abdomen - soft, nontender, nondistended, no masses or organomegaly  + bilateral inguinal hernias   Neurological - alert, oriented, normal speech, no focal findings or movement disorder noted  Physical Exam    Results Review:     The following data was reviewed by: Annemarie Whitfield MD on 12/11/2024:    CT Abdomen Pelvis With Contrast (11/26/2024 13:02)   1. Small bilateral fat-containing inguinal hernias, " measuring  approximately 1.3 cm on the right and 2 cm on the left. There is mild induration of the fat within the upper right inguinal canal which suggests a mild degree of inflammation in this region. The tip of the appendix is seen along the superior margin of the right inguinal ring,  without evidence of appendicitis.  Progress Notes by Lenny Simmons MD (12/10/2024 13:00)   4. Preoperative evaluation: Patient has an inguinal hernia that he reports causes significant discomfort. He has an appointment in surgery clinic tomorrow to discuss further options. He is an intermediate risk surgical candidate from a cardiac standpoint. Heart rate is mildly elevated at metoprolol is added to help with heart rate control. Echo was ordered for further cardiac evaluation but does not necessarily need to delay the surgery. Xarelto can be held for 48 to 72 hours prior to surgery and resume to soon as safe afterwards.     Assessment & Plan       Diagnoses and all orders for this visit:    1. Non-recurrent bilateral inguinal hernia without obstruction or gangrene (Primary)  -     Case Request; Standing  -     MRSA Screen Culture (Outpatient) - Swab, Nares; Future  -     XR chest 1 vw; Future  -     ECG 12 Lead; Future  -     heparin (porcine) 5000 UNIT/ML injection 5,000 Units  -     CBC & Differential; Future  -     Comprehensive Metabolic Panel; Future  -     ceFAZolin (ANCEF) 2,000 mg in sodium chloride 0.9 % 100 mL IVPB  -     Case Request    2. Chronic anticoagulation    Other orders  -     Follow Anesthesia Guidelines / Protocol; Future  -     Follow Anesthesia Guidelines / Protocol; Standing  -     Verify / Perform Chlorhexidine Skin Prep; Standing  -     Provide NPO Instructions to Patient; Future  -     Chlorhexidine Skin Prep; Future  -     Notify physician (specify); Standing  -     Instructions on coughing, deep breathing, and incentive spirometry.; Standing  -     Oxygen Therapy-; Standing  -     Place Sequential  Compression Device; Standing  -     Maintain Sequential Compression Device; Standing         Bin Oh is a 64 y.o. male with bilatearl inguinal hernias without obstruction or gangrene. This is significantly limiting the patient's life-style due to discomfort, and as such the patient wishes to proceed with repair. Risks of surgery including bleeding/hematoma, infection, mesh infection requiring removal, damage to surrounding structures (including the arteries, veins and nerves) with potential chronic post-operative pain, and hernia recurrence have been discussed with the patient. I also discussed the different operative approaches available for inguinal hernia repair and their respective risks and benefits including open repair, laparoscopic repair, and robotic repair. After this discussion, the patient and I have decided to proceed with a robotic bilateral inguinal hernia repair with mesh. We will wait until he has been off the prednisone for a month to improve his healing capabilities. I reviewed the cardiology note above and he is cleared for surgery. Hold Xarelto for 48 hours pre op.  The patient has an appointment for pre-operative CBC, CMP, EKG, CXR.  The patient is currently scheduled for the above operation on 1/10/25.     This is a chronic problem with progression. I have reviewed the CT and cardiology note above as well as ordered the above prework. He is at increased risk of perioperative complications 2/2 his chronic anticoagulation.     I also discussed with the patient post operative pain management including multimodal pain control utilizing Tylenol, ibuprofen, and Roxicodone for breakthrough pain. I will plan to give the patient 10 tabs of 5mg Roxicodone post operatively for breakthrough pain.    BMI is within normal parameters. No other follow-up for BMI required.      Annemarie Whitfield MD  12/11/24  10:57 CST    Patient or patient representative verbalized consent for the use of Ambient  Listening during the visit with  Annemarie Whitfield MD for chart documentation. 12/16/2024  08:23 CST

## 2024-12-11 NOTE — PROGRESS NOTES
Office New Patient History and Physical:     Referring Provider: Jacobo Bowens APRN    Chief Complaint   Patient presents with    Hernia       Subjective .     History of present illness:    History of Present Illness  The patient presents for evaluation of bilateral hernias.    He reports the presence of a right-sided hernia, which he believes originated on 10/24/2024. A CT scan conducted during an emergency room visit revealed the existence of two hernias, with the left-sided hernia currently asymptomatic. He has no history of abdominal surgery. He has been abstaining from smoking for the past 25 days. He has a daily nurse who assists him. He has been managing the discomfort with nightly heating pad use and massage therapy.    He is currently on Xarelto for stroke prevention due to his atrial fibrillation. He had a consultation with his cardiologist, Dr. Jose, yesterday, who expressed concern about his elevated heart rate, typically ranging between 130 and 140 beats per minute. He was prescribed a new medication, which has successfully reduced his heart rate. Dr. Jose has given clearance for the hernia surgery and advised him to temporarily discontinue Xarelto. He is scheduled for a follow-up appointment with Dr. Jose on Tuesday for further heart rate monitoring. He is also planning a visit to [facility] to assess his eligibility for ablation, although Dr. Jose does not believe it is necessary at this time.    He was previously on a 10 mg dose of prednisone, prescribed by his pulmonologist to aid in breathing. However, since ceasing smoking, his respiratory function has improved significantly. During an emergency room visit, he was administered a 50 mg dose of prednisone, which resulted in significant swelling. He did not take his prednisone dose this morning. He is also on several inhalers.    Supplemental Information  He was advised to discontinue prednisone and Lasix due to potential side effects, including leg  swelling and dehydration, respectively. He did not take Lasix today.    SOCIAL HISTORY  He has quit smoking and has not had a cigarette in 25 days.    MEDICATIONS  Current: Xarelto, prednisone, Lasix    History  Past Medical History:   Diagnosis Date    Acute non-recurrent sinusitis 2024    Arrhythmia     Arthritis     Asthma     Atrial fibrillation     COPD (chronic obstructive pulmonary disease)     Essential tremor     Lung nodule     Pneumonia     Pulmonary arterial hypertension    ,   Past Surgical History:   Procedure Laterality Date    FINGER SURGERY      FOOT SURGERY     ,   Family History   Problem Relation Age of Onset    Heart disease Mother     Heart disease Father     Heart disease Sister     No Known Problems Brother     No Known Problems Maternal Aunt     No Known Problems Maternal Uncle     No Known Problems Paternal Aunt     No Known Problems Paternal Uncle     No Known Problems Maternal Grandmother     No Known Problems Maternal Grandfather     No Known Problems Paternal Grandmother     No Known Problems Paternal Grandfather     Asthma Neg Hx     Cancer Neg Hx     Diabetes Neg Hx     Emphysema Neg Hx     Heart failure Neg Hx     Hypertension Neg Hx    ,   Social History     Tobacco Use    Smoking status: Former     Current packs/day: 0.00     Average packs/day: 0.3 packs/day for 48.9 years (12.2 ttl pk-yrs)     Types: Cigarettes     Start date: 1976     Quit date: 2024     Years since quittin.0     Passive exposure: Current    Smokeless tobacco: Never    Tobacco comments:     He currently is smoking 4 to 5 cigarettes/day but in the past has smoked up to a pack of cigarettes per day and has well over 30-pack-year history of smoking.   Vaping Use    Vaping status: Never Used   Substance Use Topics    Alcohol use: No    Drug use: No   , (Not in a hospital admission)   and Allergies:  Penicillins    Current Outpatient Medications:     albuterol (PROVENTIL) (2.5  MG/3ML) 0.083% nebulizer solution, TAKE 2.5 MG BY NEBULIZATION EVERY 4 (FOUR) HOURS AS NEEDED FOR WHEEZING., Disp: 360 mL, Rfl: 0    albuterol sulfate  (90 Base) MCG/ACT inhaler, INHALE 2 PUFFS EVERY 4 (FOUR) HOURS., Disp: 25.5 g, Rfl: 11    benzonatate (TESSALON) 200 MG capsule, Take 1 capsule by mouth 2 (Two) Times a Day As Needed for Cough. Indications: Cough, Disp: , Rfl:     budesonide-formoterol (SYMBICORT) 160-4.5 MCG/ACT inhaler, Inhale 2 puffs 2 (Two) Times a Day. Rinse and spit after using., Disp: 2 inhaler, Rfl: 0    Cyanocobalamin (Vitamin B-12) 5000 MCG sublingual tablet, Take 1 tablet by mouth Daily. Indications: Inadequate Vitamin B12, Disp: , Rfl:     diazePAM (VALIUM) 10 MG tablet, Take 1 tablet by mouth Every 6 (Six) Hours As Needed for Anxiety. Indications: Feeling Anxious, Disp: , Rfl:     dilTIAZem HCl Coated Beads (CARDIZEM CD PO), Take 360 mg by mouth Daily. Indications: Decreased Blood Pressure, Disp: , Rfl:     furosemide (LASIX) 20 MG tablet, Take 1 tablet by mouth Daily. Indications: Edema, Disp: , Rfl:     megestrol (MEGACE) 40 MG/ML suspension, Take 5 mL by mouth Daily. Indications: General Weight Loss and Wasting, Disp: , Rfl:     metoprolol tartrate (LOPRESSOR) 25 MG tablet, Take 1 tablet by mouth 2 (Two) Times a Day., Disp: 60 tablet, Rfl: 11    mupirocin (BACTROBAN) 2 % cream, Apply 1 Application topically to the appropriate area as directed 3 (Three) Times a Day., Disp: , Rfl:     O2 (OXYGEN), Inhale 3 L/min At Night As Needed (shortness of breath). Indications: Respiratory Failure, Disp: , Rfl:     POTASSIUM GLUCONATE PO, Take 1 tablet/day by mouth Daily. Indications: Cardiac Failure, Disp: , Rfl:     predniSONE (DELTASONE) 10 MG tablet, Take 1 tablet by mouth Daily. with food, Disp: 90 tablet, Rfl: 3    rivaroxaban (XARELTO) 20 MG tablet, Take 1 tablet by mouth Daily. Indications: Atrial Fibrillation, Disp: , Rfl:     sildenafil (VIAGRA) 100 MG tablet, Take 1 tablet by  "mouth Daily As Needed for Erectile Dysfunction., Disp: , Rfl:     tamsulosin (Flomax) 0.4 MG capsule 24 hr capsule, Take 1 capsule by mouth Daily. Indications: Chronic Prostate Gland Inflammation, Disp: , Rfl:     tiotropium bromide monohydrate (SPIRIVA RESPIMAT) 2.5 MCG/ACT aerosol solution inhaler, Inhale 2 puffs Daily., Disp: , Rfl:     traZODone (DESYREL) 50 MG tablet, Take 1 tablet by mouth Every Night. Indications: Trouble Sleeping, Disp: , Rfl:     varenicline (CHANTIX) 0.5 MG tablet, Take 2 tablets by mouth Daily. Indications: Treatment to Stop Smoking, Disp: , Rfl:     Review of Systems    Review of Systems - General ROS: negative  ENT ROS: negative  Respiratory ROS: no cough, shortness of breath, or wheezing  Cardiovascular ROS: no chest pain or dyspnea on exertion  Gastrointestinal ROS: bilateral inguinal hernias   Genito-Urinary ROS: no dysuria, trouble voiding, or hematuria  Dermatological ROS: negative   Breast ROS: negative for breast lumps  Hematological and Lymphatic ROS: negative  Musculoskeletal ROS: negative   Neurological ROS: no TIA or stroke symptoms    Psychological ROS: negative  Endocrine ROS: negative    Objective     Vital Signs   /66   Pulse 99   Ht 177.8 cm (70\")   Wt 74.4 kg (164 lb)   SpO2 90%   BMI 23.53 kg/m²      Physical Exam:  General appearance - alert, well appearing, and in no distress  Mental status - alert, oriented to person, place, and time  Eyes - pupils equal and reactive, extraocular eye movements intact  Neck - supple, no significant adenopathy  Abdomen - soft, nontender, nondistended, no masses or organomegaly  + bilateral inguinal hernias   Neurological - alert, oriented, normal speech, no focal findings or movement disorder noted  Physical Exam    Results Review:     The following data was reviewed by: Annemarie Whitfield MD on 12/11/2024:    CT Abdomen Pelvis With Contrast (11/26/2024 13:02)   1. Small bilateral fat-containing inguinal hernias, " measuring  approximately 1.3 cm on the right and 2 cm on the left. There is mild induration of the fat within the upper right inguinal canal which suggests a mild degree of inflammation in this region. The tip of the appendix is seen along the superior margin of the right inguinal ring,  without evidence of appendicitis.  Progress Notes by Lenny Simmons MD (12/10/2024 13:00)   4. Preoperative evaluation: Patient has an inguinal hernia that he reports causes significant discomfort. He has an appointment in surgery clinic tomorrow to discuss further options. He is an intermediate risk surgical candidate from a cardiac standpoint. Heart rate is mildly elevated at metoprolol is added to help with heart rate control. Echo was ordered for further cardiac evaluation but does not necessarily need to delay the surgery. Xarelto can be held for 48 to 72 hours prior to surgery and resume to soon as safe afterwards.     Assessment & Plan       Diagnoses and all orders for this visit:    1. Non-recurrent bilateral inguinal hernia without obstruction or gangrene (Primary)  -     Case Request; Standing  -     MRSA Screen Culture (Outpatient) - Swab, Nares; Future  -     XR chest 1 vw; Future  -     ECG 12 Lead; Future  -     heparin (porcine) 5000 UNIT/ML injection 5,000 Units  -     CBC & Differential; Future  -     Comprehensive Metabolic Panel; Future  -     ceFAZolin (ANCEF) 2,000 mg in sodium chloride 0.9 % 100 mL IVPB  -     Case Request    2. Chronic anticoagulation    Other orders  -     Follow Anesthesia Guidelines / Protocol; Future  -     Follow Anesthesia Guidelines / Protocol; Standing  -     Verify / Perform Chlorhexidine Skin Prep; Standing  -     Provide NPO Instructions to Patient; Future  -     Chlorhexidine Skin Prep; Future  -     Notify physician (specify); Standing  -     Instructions on coughing, deep breathing, and incentive spirometry.; Standing  -     Oxygen Therapy-; Standing  -     Place Sequential  Compression Device; Standing  -     Maintain Sequential Compression Device; Standing         Bin Oh is a 64 y.o. male with bilatearl inguinal hernias without obstruction or gangrene. This is significantly limiting the patient's life-style due to discomfort, and as such the patient wishes to proceed with repair. Risks of surgery including bleeding/hematoma, infection, mesh infection requiring removal, damage to surrounding structures (including the arteries, veins and nerves) with potential chronic post-operative pain, and hernia recurrence have been discussed with the patient. I also discussed the different operative approaches available for inguinal hernia repair and their respective risks and benefits including open repair, laparoscopic repair, and robotic repair. After this discussion, the patient and I have decided to proceed with a robotic bilateral inguinal hernia repair with mesh. We will wait until he has been off the prednisone for a month to improve his healing capabilities. I reviewed the cardiology note above and he is cleared for surgery. Hold Xarelto for 48 hours pre op.  The patient has an appointment for pre-operative CBC, CMP, EKG, CXR.  The patient is currently scheduled for the above operation on 1/10/25.     This is a chronic problem with progression. I have reviewed the CT and cardiology note above as well as ordered the above prework. He is at increased risk of perioperative complications 2/2 his chronic anticoagulation.     I also discussed with the patient post operative pain management including multimodal pain control utilizing Tylenol, ibuprofen, and Roxicodone for breakthrough pain. I will plan to give the patient 10 tabs of 5mg Roxicodone post operatively for breakthrough pain.    BMI is within normal parameters. No other follow-up for BMI required.      Annemarie Whitfield MD  12/11/24  10:57 CST    Patient or patient representative verbalized consent for the use of Ambient  Listening during the visit with  Annemarie Whitfield MD for chart documentation. 12/16/2024  08:23 CST

## 2024-12-17 ENCOUNTER — OFFICE VISIT (OUTPATIENT)
Dept: CARDIOLOGY | Facility: CLINIC | Age: 64
End: 2024-12-17
Payer: OTHER MISCELLANEOUS

## 2024-12-17 VITALS
OXYGEN SATURATION: 92 % | HEIGHT: 70 IN | WEIGHT: 166 LBS | SYSTOLIC BLOOD PRESSURE: 110 MMHG | HEART RATE: 103 BPM | DIASTOLIC BLOOD PRESSURE: 64 MMHG | BODY MASS INDEX: 23.77 KG/M2

## 2024-12-17 DIAGNOSIS — Z79.01 CHRONIC ANTICOAGULATION: ICD-10-CM

## 2024-12-17 DIAGNOSIS — Z01.818 PREOPERATIVE EVALUATION TO RULE OUT SURGICAL CONTRAINDICATION: ICD-10-CM

## 2024-12-17 DIAGNOSIS — R60.0 BILATERAL LOWER EXTREMITY EDEMA: ICD-10-CM

## 2024-12-17 DIAGNOSIS — I48.11 LONGSTANDING PERSISTENT ATRIAL FIBRILLATION: Primary | ICD-10-CM

## 2024-12-17 RX ORDER — METOPROLOL TARTRATE 50 MG
50 TABLET ORAL 2 TIMES DAILY
Qty: 60 TABLET | Refills: 11 | Status: SHIPPED | OUTPATIENT
Start: 2024-12-17

## 2024-12-17 NOTE — LETTER
December 17, 2024     Harry Fontaine MD  [address] [facility]  [address]  [facility] [address]    Patient: Bin Oh   YOB: 1960   Date of Visit: 12/17/2024       Dear Harry Fontaine MD    Bin Oh was in my office today. Below is a copy of my note.    If you have questions, please do not hesitate to call me. I look forward to following Bin along with you.         Sincerely,        Lenny Simmons MD        CC: No Recipients      Reason for Visit: cardiovascular follow up.    HPI:  Bin Oh is a 64 y.o. male is here today for follow-up.  He was seen cardiology consultation on 12/10/2024 for evaluation of tachycardia.  He had reported chronic atrial fibrillation since 2014.  His inguinal hernia has been causing a lot of pain and driving up his heart rate.  He was started on metoprolol 25 mg twice daily to help with rate control.  An echo was ordered due to lower extremity edema and atrial fibrillation and is pending.  Hernia surgery is scheduled form 1/10/2025.  He feels better since starting metoprolol.  His heart rate still runs a little fast.  He has ordered tighter compression stockings to help with the edema.     Previous Cardiac Testing and Procedures:  -Stress echo (4/6/2015) low risk for ischemia  -Holter monitor (7/3/2018) atrial fibrillation with an average heart rate of 63 bpm, no significant pauses, rare PVCs  -Echo (5/22/2023) EF 65%, mild LVH, diastolic dysfunction, mildly reduced RV systolic function, mild to moderate RV dilation, mild LA dilation  -EKG (11/26/2024) atrial fibrillation, heart rate 93 bpm, LAFB    Lab data:  -BMP (11/26/2024) creatinine 0.2, potassium 4.4, sodium 136  -proBNP (11/26/2024) 551     Patient Active Problem List   Diagnosis   • Shortness of breath   • Nocturnal hypoxemia   • COPD, severe   • Scarring of lung   • Cigarette nicotine dependence without complication   • Lung nodules   • Hypoxemia requiring supplemental oxygen   • Longstanding  persistent atrial fibrillation   • Bronchitis   • Centrilobular emphysema   • Cutaneous abscess of buttock   • Perianal abscess   • Acute non-recurrent sinusitis   • Chronic anticoagulation   • Non-recurrent bilateral inguinal hernia without obstruction or gangrene   • Bilateral lower extremity edema       Social History     Tobacco Use   • Smoking status: Former     Current packs/day: 0.00     Average packs/day: 0.3 packs/day for 48.9 years (12.2 ttl pk-yrs)     Types: Cigarettes     Start date: 1976     Quit date: 2024     Years since quittin.0     Passive exposure: Current   • Smokeless tobacco: Never   • Tobacco comments:     He currently is smoking 4 to 5 cigarettes/day but in the past has smoked up to a pack of cigarettes per day and has well over 30-pack-year history of smoking.   Vaping Use   • Vaping status: Never Used   Substance Use Topics   • Alcohol use: No   • Drug use: No       Family History   Problem Relation Age of Onset   • Heart disease Mother    • Heart disease Father    • Heart disease Sister    • No Known Problems Brother    • No Known Problems Maternal Aunt    • No Known Problems Maternal Uncle    • No Known Problems Paternal Aunt    • No Known Problems Paternal Uncle    • No Known Problems Maternal Grandmother    • No Known Problems Maternal Grandfather    • No Known Problems Paternal Grandmother    • No Known Problems Paternal Grandfather    • Asthma Neg Hx    • Cancer Neg Hx    • Diabetes Neg Hx    • Emphysema Neg Hx    • Heart failure Neg Hx    • Hypertension Neg Hx        The following portions of the patient's history were reviewed and updated as appropriate: allergies, current medications, past family history, past medical history, past social history, past surgical history, and problem list.      Current Outpatient Medications:   •  albuterol (PROVENTIL) (2.5 MG/3ML) 0.083% nebulizer solution, TAKE 2.5 MG BY NEBULIZATION EVERY 4 (FOUR) HOURS AS NEEDED FOR WHEEZING.,  Disp: 360 mL, Rfl: 0  •  albuterol sulfate  (90 Base) MCG/ACT inhaler, INHALE 2 PUFFS EVERY 4 (FOUR) HOURS., Disp: 25.5 g, Rfl: 11  •  benzonatate (TESSALON) 200 MG capsule, Take 1 capsule by mouth 2 (Two) Times a Day As Needed for Cough. Indications: Cough, Disp: , Rfl:   •  budesonide-formoterol (SYMBICORT) 160-4.5 MCG/ACT inhaler, Inhale 2 puffs 2 (Two) Times a Day. Rinse and spit after using., Disp: 2 inhaler, Rfl: 0  •  Cyanocobalamin (Vitamin B-12) 5000 MCG sublingual tablet, Take 1 tablet by mouth Daily. Indications: Inadequate Vitamin B12, Disp: , Rfl:   •  diazePAM (VALIUM) 10 MG tablet, Take 1 tablet by mouth Every 6 (Six) Hours As Needed for Anxiety. Indications: Feeling Anxious, Disp: , Rfl:   •  dilTIAZem HCl Coated Beads (CARDIZEM CD PO), Take 360 mg by mouth Daily. Indications: Decreased Blood Pressure, Disp: , Rfl:   •  furosemide (LASIX) 20 MG tablet, Take 1 tablet by mouth Daily. Indications: Edema, Disp: , Rfl:   •  megestrol (MEGACE) 40 MG/ML suspension, Take 5 mL by mouth Daily. Indications: General Weight Loss and Wasting, Disp: , Rfl:   •  metoprolol tartrate (LOPRESSOR) 50 MG tablet, Take 1 tablet by mouth 2 (Two) Times a Day., Disp: 60 tablet, Rfl: 11  •  mupirocin (BACTROBAN) 2 % cream, Apply 1 Application topically to the appropriate area as directed 3 (Three) Times a Day., Disp: , Rfl:   •  O2 (OXYGEN), Inhale 3 L/min At Night As Needed (shortness of breath). Indications: Respiratory Failure, Disp: , Rfl:   •  POTASSIUM GLUCONATE PO, Take 1 tablet/day by mouth Daily. Indications: Cardiac Failure, Disp: , Rfl:   •  predniSONE (DELTASONE) 10 MG tablet, Take 1 tablet by mouth Daily. with food, Disp: 90 tablet, Rfl: 3  •  rivaroxaban (XARELTO) 20 MG tablet, Take 1 tablet by mouth Daily. Indications: Atrial Fibrillation, Disp: , Rfl:   •  sildenafil (VIAGRA) 100 MG tablet, Take 1 tablet by mouth Daily As Needed for Erectile Dysfunction., Disp: , Rfl:   •  tamsulosin (Flomax) 0.4 MG  "capsule 24 hr capsule, Take 1 capsule by mouth Daily. Indications: Chronic Prostate Gland Inflammation, Disp: , Rfl:   •  tiotropium bromide monohydrate (SPIRIVA RESPIMAT) 2.5 MCG/ACT aerosol solution inhaler, Inhale 2 puffs Daily., Disp: , Rfl:   •  traZODone (DESYREL) 50 MG tablet, Take 1 tablet by mouth Every Night. Indications: Trouble Sleeping, Disp: , Rfl:   •  varenicline (CHANTIX) 0.5 MG tablet, Take 2 tablets by mouth Daily. Indications: Treatment to Stop Smoking, Disp: , Rfl:     Review of Systems   Constitutional: Negative for chills and fever.   Cardiovascular:  Positive for leg swelling and palpitations. Negative for chest pain and paroxysmal nocturnal dyspnea.   Respiratory:  Negative for cough and shortness of breath.    Skin:  Negative for rash.   Gastrointestinal:  Negative for abdominal pain and heartburn.   Neurological:  Negative for dizziness and numbness.       Objective  /64 (BP Location: Left arm, Patient Position: Sitting, Cuff Size: Adult)   Pulse 103   Ht 177.8 cm (70\")   Wt 75.3 kg (166 lb)   SpO2 92%   BMI 23.82 kg/m²   Constitutional:       Appearance: Well-developed.   HENT:      Head: Normocephalic and atraumatic.   Pulmonary:      Effort: Pulmonary effort is normal.      Breath sounds: Normal breath sounds.   Cardiovascular:      Tachycardia present. Irregularly irregular rhythm.   Edema:     Peripheral edema present.  Skin:     General: Skin is warm and dry.   Neurological:      Mental Status: Alert and oriented to person, place, and time.         ECG 12 Lead    Date/Time: 12/17/2024 3:02 PM  Performed by: Lenny Simmons MD    Authorized by: Lenny Simmons MD  Comparison: compared with previous ECG from 12/10/2024  Comparison to previous ECG: HR has improved  Rhythm: atrial fibrillation  Rate: tachycardic  QRS axis: indeterminate  Other findings: non-specific ST-T wave changes            ICD-10-CM ICD-9-CM   1. Longstanding persistent atrial fibrillation  I48.11 " 427.31   2. Chronic anticoagulation  Z79.01 V58.61   3. Bilateral lower extremity edema  R60.0 782.3   4. Preoperative evaluation to rule out surgical contraindication  Z01.818 V72.83         Assessment/Plan:  1.  Persistent atrial fibrillation: Improved heart rate control but still mildly tachycardic.  Has an appointment in EP clinic [facility] on 1/6/2024.  Rhythm control options likely limited given the very long duration of atrial fibrillation (reportedly present since 2014).  Titrate up metoprolol to 50 mg.  Continue diltiazem and Xarelto.  Echo pending.     2.  Chronic anticoagulation: Continue Xarelto.     3.  Lower extremity edema: Persistent moderate to severe bilateral lower extremity edema.  Concern for a component of right heart failure given COPD.  Increased strength compression stockings have been ordered.  Continue to encourage leg elevation.  Repeat echo pending.  Continue Lasix.      4.  Preoperative evaluation: Surgery for inguinal hernia planned on 1/10/2024.  Acceptable to proceed from a cardiac standpoint given improved control of atrial fibrillation. Xarelto can be held for 48 to 72 hours prior to surgery and resumed to soon as safe afterwards.

## 2024-12-17 NOTE — PROGRESS NOTES
Reason for Visit: cardiovascular follow up.    HPI:  Bin Oh is a 64 y.o. male is here today for follow-up.  He was seen cardiology consultation on 12/10/2024 for evaluation of tachycardia.  He had reported chronic atrial fibrillation since 2014.  His inguinal hernia has been causing a lot of pain and driving up his heart rate.  He was started on metoprolol 25 mg twice daily to help with rate control.  An echo was ordered due to lower extremity edema and atrial fibrillation and is pending.  Hernia surgery is scheduled form 1/10/2025.  He feels better since starting metoprolol.  His heart rate still runs a little fast.  He has ordered tighter compression stockings to help with the edema.     Previous Cardiac Testing and Procedures:  -Stress echo (4/6/2015) low risk for ischemia  -Holter monitor (7/3/2018) atrial fibrillation with an average heart rate of 63 bpm, no significant pauses, rare PVCs  -Echo (5/22/2023) EF 65%, mild LVH, diastolic dysfunction, mildly reduced RV systolic function, mild to moderate RV dilation, mild LA dilation  -EKG (11/26/2024) atrial fibrillation, heart rate 93 bpm, LAFB    Lab data:  -BMP (11/26/2024) creatinine 0.2, potassium 4.4, sodium 136  -proBNP (11/26/2024) 551     Patient Active Problem List   Diagnosis    Shortness of breath    Nocturnal hypoxemia    COPD, severe    Scarring of lung    Cigarette nicotine dependence without complication    Lung nodules    Hypoxemia requiring supplemental oxygen    Longstanding persistent atrial fibrillation    Bronchitis    Centrilobular emphysema    Cutaneous abscess of buttock    Perianal abscess    Acute non-recurrent sinusitis    Chronic anticoagulation    Non-recurrent bilateral inguinal hernia without obstruction or gangrene    Bilateral lower extremity edema       Social History     Tobacco Use    Smoking status: Former     Current packs/day: 0.00     Average packs/day: 0.3 packs/day for 48.9 years (12.2 ttl pk-yrs)     Types:  Cigarettes     Start date: 1976     Quit date: 2024     Years since quittin.0     Passive exposure: Current    Smokeless tobacco: Never    Tobacco comments:     He currently is smoking 4 to 5 cigarettes/day but in the past has smoked up to a pack of cigarettes per day and has well over 30-pack-year history of smoking.   Vaping Use    Vaping status: Never Used   Substance Use Topics    Alcohol use: No    Drug use: No       Family History   Problem Relation Age of Onset    Heart disease Mother     Heart disease Father     Heart disease Sister     No Known Problems Brother     No Known Problems Maternal Aunt     No Known Problems Maternal Uncle     No Known Problems Paternal Aunt     No Known Problems Paternal Uncle     No Known Problems Maternal Grandmother     No Known Problems Maternal Grandfather     No Known Problems Paternal Grandmother     No Known Problems Paternal Grandfather     Asthma Neg Hx     Cancer Neg Hx     Diabetes Neg Hx     Emphysema Neg Hx     Heart failure Neg Hx     Hypertension Neg Hx        The following portions of the patient's history were reviewed and updated as appropriate: allergies, current medications, past family history, past medical history, past social history, past surgical history, and problem list.      Current Outpatient Medications:     albuterol (PROVENTIL) (2.5 MG/3ML) 0.083% nebulizer solution, TAKE 2.5 MG BY NEBULIZATION EVERY 4 (FOUR) HOURS AS NEEDED FOR WHEEZING., Disp: 360 mL, Rfl: 0    albuterol sulfate  (90 Base) MCG/ACT inhaler, INHALE 2 PUFFS EVERY 4 (FOUR) HOURS., Disp: 25.5 g, Rfl: 11    benzonatate (TESSALON) 200 MG capsule, Take 1 capsule by mouth 2 (Two) Times a Day As Needed for Cough. Indications: Cough, Disp: , Rfl:     budesonide-formoterol (SYMBICORT) 160-4.5 MCG/ACT inhaler, Inhale 2 puffs 2 (Two) Times a Day. Rinse and spit after using., Disp: 2 inhaler, Rfl: 0    Cyanocobalamin (Vitamin B-12) 5000 MCG sublingual tablet, Take 1 tablet  by mouth Daily. Indications: Inadequate Vitamin B12, Disp: , Rfl:     diazePAM (VALIUM) 10 MG tablet, Take 1 tablet by mouth Every 6 (Six) Hours As Needed for Anxiety. Indications: Feeling Anxious, Disp: , Rfl:     dilTIAZem HCl Coated Beads (CARDIZEM CD PO), Take 360 mg by mouth Daily. Indications: Decreased Blood Pressure, Disp: , Rfl:     furosemide (LASIX) 20 MG tablet, Take 1 tablet by mouth Daily. Indications: Edema, Disp: , Rfl:     megestrol (MEGACE) 40 MG/ML suspension, Take 5 mL by mouth Daily. Indications: General Weight Loss and Wasting, Disp: , Rfl:     metoprolol tartrate (LOPRESSOR) 50 MG tablet, Take 1 tablet by mouth 2 (Two) Times a Day., Disp: 60 tablet, Rfl: 11    mupirocin (BACTROBAN) 2 % cream, Apply 1 Application topically to the appropriate area as directed 3 (Three) Times a Day., Disp: , Rfl:     O2 (OXYGEN), Inhale 3 L/min At Night As Needed (shortness of breath). Indications: Respiratory Failure, Disp: , Rfl:     POTASSIUM GLUCONATE PO, Take 1 tablet/day by mouth Daily. Indications: Cardiac Failure, Disp: , Rfl:     predniSONE (DELTASONE) 10 MG tablet, Take 1 tablet by mouth Daily. with food, Disp: 90 tablet, Rfl: 3    rivaroxaban (XARELTO) 20 MG tablet, Take 1 tablet by mouth Daily. Indications: Atrial Fibrillation, Disp: , Rfl:     sildenafil (VIAGRA) 100 MG tablet, Take 1 tablet by mouth Daily As Needed for Erectile Dysfunction., Disp: , Rfl:     tamsulosin (Flomax) 0.4 MG capsule 24 hr capsule, Take 1 capsule by mouth Daily. Indications: Chronic Prostate Gland Inflammation, Disp: , Rfl:     tiotropium bromide monohydrate (SPIRIVA RESPIMAT) 2.5 MCG/ACT aerosol solution inhaler, Inhale 2 puffs Daily., Disp: , Rfl:     traZODone (DESYREL) 50 MG tablet, Take 1 tablet by mouth Every Night. Indications: Trouble Sleeping, Disp: , Rfl:     varenicline (CHANTIX) 0.5 MG tablet, Take 2 tablets by mouth Daily. Indications: Treatment to Stop Smoking, Disp: , Rfl:     Review of Systems  "  Constitutional: Negative for chills and fever.   Cardiovascular:  Positive for leg swelling and palpitations. Negative for chest pain and paroxysmal nocturnal dyspnea.   Respiratory:  Negative for cough and shortness of breath.    Skin:  Negative for rash.   Gastrointestinal:  Negative for abdominal pain and heartburn.   Neurological:  Negative for dizziness and numbness.       Objective   /64 (BP Location: Left arm, Patient Position: Sitting, Cuff Size: Adult)   Pulse 103   Ht 177.8 cm (70\")   Wt 75.3 kg (166 lb)   SpO2 92%   BMI 23.82 kg/m²   Constitutional:       Appearance: Well-developed.   HENT:      Head: Normocephalic and atraumatic.   Pulmonary:      Effort: Pulmonary effort is normal.      Breath sounds: Normal breath sounds.   Cardiovascular:      Tachycardia present. Irregularly irregular rhythm.   Edema:     Peripheral edema present.  Skin:     General: Skin is warm and dry.   Neurological:      Mental Status: Alert and oriented to person, place, and time.         ECG 12 Lead    Date/Time: 12/17/2024 3:02 PM  Performed by: Lenny Simmons MD    Authorized by: Lenny Simmons MD  Comparison: compared with previous ECG from 12/10/2024  Comparison to previous ECG: HR has improved  Rhythm: atrial fibrillation  Rate: tachycardic  QRS axis: indeterminate  Other findings: non-specific ST-T wave changes            ICD-10-CM ICD-9-CM   1. Longstanding persistent atrial fibrillation  I48.11 427.31   2. Chronic anticoagulation  Z79.01 V58.61   3. Bilateral lower extremity edema  R60.0 782.3   4. Preoperative evaluation to rule out surgical contraindication  Z01.818 V72.83         Assessment/Plan:  1.  Persistent atrial fibrillation: Improved heart rate control but still mildly tachycardic.  Has an appointment in EP clinic at [facility] on 1/6/2024.  Rhythm control options likely limited given the very long duration of atrial fibrillation (reportedly present since 2014).  Titrate up metoprolol " to 50 mg.  Continue diltiazem and Xarelto.  Echo pending.     2.  Chronic anticoagulation: Continue Xarelto.     3.  Lower extremity edema: Persistent moderate to severe bilateral lower extremity edema.  Concern for a component of right heart failure given COPD.  Increased strength compression stockings have been ordered.  Continue to encourage leg elevation.  Repeat echo pending.  Continue Lasix.      4.  Preoperative evaluation: Surgery for inguinal hernia planned on 1/10/2024.  Acceptable to proceed from a cardiac standpoint given improved control of atrial fibrillation. Xarelto can be held for 48 to 72 hours prior to surgery and resumed to soon as safe afterwards.

## 2024-12-28 ENCOUNTER — PATIENT MESSAGE (OUTPATIENT)
Dept: PULMONOLOGY | Facility: CLINIC | Age: 64
End: 2024-12-28
Payer: MEDICARE

## 2024-12-30 ENCOUNTER — TELEPHONE (OUTPATIENT)
Dept: PULMONOLOGY | Facility: CLINIC | Age: 64
End: 2024-12-30

## 2024-12-30 DIAGNOSIS — J44.9 COPD, SEVERE: ICD-10-CM

## 2024-12-30 DIAGNOSIS — F17.210 CIGARETTE NICOTINE DEPENDENCE WITHOUT COMPLICATION: Primary | ICD-10-CM

## 2024-12-30 RX ORDER — VARENICLINE TARTRATE 0.5 MG/1
1 TABLET, FILM COATED ORAL DAILY
Qty: 180 TABLET | Refills: 3 | Status: SHIPPED | OUTPATIENT
Start: 2024-12-30

## 2024-12-30 RX ORDER — ALBUTEROL SULFATE 90 UG/1
2 INHALANT RESPIRATORY (INHALATION) EVERY 4 HOURS
Qty: 54 G | Refills: 3 | Status: SHIPPED | OUTPATIENT
Start: 2024-12-30

## 2024-12-30 NOTE — TELEPHONE ENCOUNTER
Rx Refill Note  Requested Prescriptions     Pending Prescriptions Disp Refills    albuterol sulfate  (90 Base) MCG/ACT inhaler 54 g 0     Sig: Inhale 2 puffs Every 4 (Four) Hours. Indications: ACUTE EXACERBATION OF COPD (INACTIVE)      Last office visit with prescribing clinician: 11/4/2024   Last telemedicine visit with prescribing clinician: Visit date not found   Next office visit with prescribing clinician: 5/5/2025                         Would you like a call back once the refill request has been completed: [] Yes [] No    If the office needs to give you a call back, can they leave a voicemail: [] Yes [] No    Ana Lilia Richardson MA  12/30/24, 08:10 CST

## 2024-12-30 NOTE — TELEPHONE ENCOUNTER
Caller: Bin Oh    Relationship: Self    Best call back number: [phone]    Requested Prescriptions:  varenicline (CHANTIX) 0.5 MG tablet   Requested Prescriptions      No prescriptions requested or ordered in this encounter        Pharmacy where request should be sent:  [address] - [address] - [phone] [facility] [phone] FX  [phone] [address] [facility] [address]      Last office visit with prescribing clinician: 11/4/2024   Last telemedicine visit with prescribing clinician: Visit date not found   Next office visit with prescribing clinician: 5/5/2025     Additional details provided by patient: PT IS USING THE PHARMACY [address] - [address]  [phone] [facility] [phone] FX  [phone] [address] [facility] [address]   FROM NOW ON    Does the patient have less than a 3 day supply:  [x] Yes  [] No    Would you like a call back once the refill request has been completed: [x] Yes [] No    If the office needs to give you a call back, can they leave a voicemail: [x] Yes [] No    Deepti De La Fuente Rep   12/30/24 09:19 CST

## 2024-12-30 NOTE — TELEPHONE ENCOUNTER
Rx Refill Note  Requested Prescriptions     Pending Prescriptions Disp Refills    varenicline (CHANTIX) 0.5 MG tablet 180 tablet 3     Sig: Take 2 tablets by mouth Daily. Indications: Treatment to Stop Smoking      Last office visit with prescribing clinician: 11/4/2024   Last telemedicine visit with prescribing clinician: Visit date not found   Next office visit with prescribing clinician: 5/5/2025                         Would you like a call back once the refill request has been completed: [] Yes [] No    If the office needs to give you a call back, can they leave a voicemail: [] Yes [] No    Ana Lilia Richardson MA  12/30/24, 08:13 CST

## 2025-01-03 ENCOUNTER — PRE-ADMISSION TESTING (OUTPATIENT)
Dept: PREADMISSION TESTING | Facility: HOSPITAL | Age: 65
End: 2025-01-03
Payer: MEDICARE

## 2025-01-03 VITALS
WEIGHT: 178.35 LBS | HEIGHT: 70 IN | HEART RATE: 89 BPM | SYSTOLIC BLOOD PRESSURE: 101 MMHG | OXYGEN SATURATION: 95 % | DIASTOLIC BLOOD PRESSURE: 65 MMHG | RESPIRATION RATE: 16 BRPM | BODY MASS INDEX: 25.53 KG/M2

## 2025-01-03 DIAGNOSIS — K40.20 NON-RECURRENT BILATERAL INGUINAL HERNIA WITHOUT OBSTRUCTION OR GANGRENE: ICD-10-CM

## 2025-01-03 LAB
ALBUMIN SERPL-MCNC: 3.4 G/DL (ref 3.5–5.2)
ALBUMIN/GLOB SERPL: 1.1 G/DL
ALP SERPL-CCNC: 92 U/L (ref 39–117)
ALT SERPL W P-5'-P-CCNC: 14 U/L (ref 1–41)
ANION GAP SERPL CALCULATED.3IONS-SCNC: 7 MMOL/L (ref 5–15)
AST SERPL-CCNC: 17 U/L (ref 1–40)
BASOPHILS # BLD AUTO: 0.07 10*3/MM3 (ref 0–0.2)
BASOPHILS NFR BLD AUTO: 0.7 % (ref 0–1.5)
BILIRUB SERPL-MCNC: 0.4 MG/DL (ref 0–1.2)
BUN SERPL-MCNC: 14 MG/DL (ref 8–23)
BUN/CREAT SERPL: 17.7 (ref 7–25)
CALCIUM SPEC-SCNC: 9 MG/DL (ref 8.6–10.5)
CHLORIDE SERPL-SCNC: 103 MMOL/L (ref 98–107)
CO2 SERPL-SCNC: 30 MMOL/L (ref 22–29)
CREAT SERPL-MCNC: 0.79 MG/DL (ref 0.76–1.27)
DEPRECATED RDW RBC AUTO: 49.5 FL (ref 37–54)
EGFRCR SERPLBLD CKD-EPI 2021: 98 ML/MIN/1.73
EOSINOPHIL # BLD AUTO: 0.06 10*3/MM3 (ref 0–0.4)
EOSINOPHIL NFR BLD AUTO: 0.6 % (ref 0.3–6.2)
ERYTHROCYTE [DISTWIDTH] IN BLOOD BY AUTOMATED COUNT: 14.7 % (ref 12.3–15.4)
GLOBULIN UR ELPH-MCNC: 3 GM/DL
GLUCOSE SERPL-MCNC: 72 MG/DL (ref 65–99)
HCT VFR BLD AUTO: 38.1 % (ref 37.5–51)
HGB BLD-MCNC: 11.8 G/DL (ref 13–17.7)
IMM GRANULOCYTES # BLD AUTO: 0.15 10*3/MM3 (ref 0–0.05)
IMM GRANULOCYTES NFR BLD AUTO: 1.6 % (ref 0–0.5)
LYMPHOCYTES # BLD AUTO: 1.25 10*3/MM3 (ref 0.7–3.1)
LYMPHOCYTES NFR BLD AUTO: 13 % (ref 19.6–45.3)
MCH RBC QN AUTO: 28.1 PG (ref 26.6–33)
MCHC RBC AUTO-ENTMCNC: 31 G/DL (ref 31.5–35.7)
MCV RBC AUTO: 90.7 FL (ref 79–97)
MONOCYTES # BLD AUTO: 0.84 10*3/MM3 (ref 0.1–0.9)
MONOCYTES NFR BLD AUTO: 8.7 % (ref 5–12)
NEUTROPHILS NFR BLD AUTO: 7.27 10*3/MM3 (ref 1.7–7)
NEUTROPHILS NFR BLD AUTO: 75.4 % (ref 42.7–76)
NRBC BLD AUTO-RTO: 0 /100 WBC (ref 0–0.2)
PLATELET # BLD AUTO: 309 10*3/MM3 (ref 140–450)
PMV BLD AUTO: 8.3 FL (ref 6–12)
POTASSIUM SERPL-SCNC: 4.1 MMOL/L (ref 3.5–5.2)
PROT SERPL-MCNC: 6.4 G/DL (ref 6–8.5)
RBC # BLD AUTO: 4.2 10*6/MM3 (ref 4.14–5.8)
SODIUM SERPL-SCNC: 140 MMOL/L (ref 136–145)
WBC NRBC COR # BLD AUTO: 9.64 10*3/MM3 (ref 3.4–10.8)

## 2025-01-03 PROCEDURE — 80053 COMPREHEN METABOLIC PANEL: CPT

## 2025-01-03 PROCEDURE — 85025 COMPLETE CBC W/AUTO DIFF WBC: CPT

## 2025-01-03 PROCEDURE — 36415 COLL VENOUS BLD VENIPUNCTURE: CPT

## 2025-01-03 PROCEDURE — 87081 CULTURE SCREEN ONLY: CPT

## 2025-01-03 RX ORDER — BUMETANIDE 0.5 MG/1
1 TABLET ORAL DAILY
COMMUNITY
Start: 2024-12-26

## 2025-01-03 NOTE — DISCHARGE INSTRUCTIONS
Preparing for Surgery  Follow these instructions before the procedure:  Several days or weeks before your procedure  Medication(s) you need to stop   _______ days/week prior to surgery: JUAN [address] DR'S INSTRUCTIONS      Ask your health care provider about:  Changing or stopping your regular medicines. This is especially important if you are taking diabetes medicines or blood thinners.  Taking medicines such as aspirin and ibuprofen. These medicines can thin your blood. Do not take these medicines unless your health care provider tells you to take them.  Taking over-the-counter medicines, vitamins, herbs, and supplements.    Contact your surgeon if you:  Develop a fever of more than 100.4°F (38°C) or other feelings of illness during the 48 hours before your surgery.  Have symptoms that get worse.  Have questions or concerns about your surgery.  If you are going home the same day of your surgery you will need to arrange for a responsible adult, age 18 years old or older, to drive you home from the hospital and stay with you for 24 hours. Verification of the  will be made prior to any procedure requiring sedation. You may not go home in a taxi or any form of public transportation by yourself.     Day before your procedure  Medication(s) you need to stop the day before your surgery:VIAGRA    24 hours before your procedure DO NOT drink alcoholic beverages or smoke.  24 hours before your procedure STOP taking Erectile Dysfunction medication (i.e.,Cialis, Viagra)   You may be asked to shower with a germ-killing soap.  Day of your procedure   You may take the following medication(s) the morning of surgery with a sip of water: METOPROLOL AND VALIUM      8 hours before your scheduled arrival time, STOP all food, any dairy products, and full liquids. This includes hard candy, chewing gum or mints. This is extremely important to prevent serious complications.     Up to 2 hours before your scheduled arrival time,  you may have clear liquids no cream, powder, or pulp of any kind. Safe options are water, black coffee, plain tea, soda, Gatorade/Powerade, clear broth, apple juice.    2 hours before your scheduled arrival time, STOP drinking clear liquids.    You may need to take another shower with a germ-killing soap before you leave home in the morning. Do not use perfumes, colognes, or body lotions.  Wear comfortable loose-fitting clothing.  Remove all jewelry including body piercing and rings, dark colored nail polish, and make up prior to arrival at the hospital. Leave all valuables at home.   Bring your hearing aids if you rely on them.  Do not wear contact lenses. If you wear eyeglasses remember to bring a case to store them in while you are in surgery.  Do not use denture adhesives since you will be asked to remove them during your surgery.    You do not need to bring your home medications into the hospital.   Bring your sleep apnea device with you on the day of your surgery (if this applies to you).  If you have an Inspire implant for sleep apnea, please bring the remote with you on the day of surgery.  If you wear portable oxygen, bring it with you.   If you are staying overnight, you may bring a bag of items you may need such as slippers, robe and a change of clothes for your discharge. You may want to leave these items in the car until you are ready for them since your family will take your belongings when you leave the pre-operative area.  Arrive at the hospital as scheduled by the office. You will be asked to arrive 2 hours prior to your surgery time in order to prepare for your procedure.  When you arrive at the hospital  Go to the registration desk located at the main entrance of the hospital.  After registration is completed, you will be given a beeper and a sticker sheet. Take the stickers to Outpatient Surgery and place in the tray at the end of the desk to notify the staff that you have arrived and registered.    Return to the lobby to wait. You are not always called back according to the time of arrival but rather the time your doctor will be ready.  When your beeper lights up and vibrates proceed through the double doors, under the stairs, and a member of the Outpatient Surgery staff will escort you to your preoperative room.   How to Use Chlorhexidine Before Surgery  Chlorhexidine gluconate (CHG) is a germ-killing (antiseptic) solution that is used to clean the skin. It can get rid of the bacteria that normally live on the skin and can keep them away for about 24 hours. To clean your skin with CHG, you may be given:  A CHG solution to use in the shower or as part of a sponge bath.  A prepackaged cloth that contains CHG.  Cleaning your skin with CHG may help lower the risk for infection:  While you are staying in the intensive care unit of the hospital.  If you have a vascular access, such as a central line, to provide short-term or long-term access to your veins.  If you have a catheter to drain urine from your bladder.  If you are on a ventilator. A ventilator is a machine that helps you breathe by moving air in and out of your lungs.  After surgery.  What are the risks?  Risks of using CHG include:  A skin reaction.  Hearing loss, if CHG gets in your ears and you have a perforated eardrum.  Eye injury, if CHG gets in your eyes and is not rinsed out.  The CHG product catching fire.  Make sure that you avoid smoking and flames after applying CHG to your skin.  Do not use CHG:  If you have a chlorhexidine allergy or have previously reacted to chlorhexidine.  On babies younger than 2 months of age.  How to use CHG solution  Use CHG only as told by your health care provider, and follow the instructions on the label.  Use the full amount of CHG as directed. Usually, this is one bottle.  During a shower    Follow these steps when using CHG solution during a shower (unless your health care provider gives you different  instructions):  Start the shower.  Use your normal soap and shampoo to wash your face and hair.  Turn off the shower or move out of the shower stream.  Pour the CHG onto a clean washcloth. Do not use any type of brush or rough-edged sponge.  Starting at your neck, lather your body down to your toes. Make sure you follow these instructions:  If you will be having surgery, pay special attention to the part of your body where you will be having surgery. Scrub this area for at least 1 minute.  Do not use CHG on your head or face. If the solution gets into your ears or eyes, rinse them well with water.  Avoid your genital area.  Avoid any areas of skin that have broken skin, cuts, or scrapes.  Scrub your back and under your arms. Make sure to wash skin folds.  Let the lather sit on your skin for 1-2 minutes or as long as told by your health care provider.  Thoroughly rinse your entire body in the shower. Make sure that all body creases and crevices are rinsed well.  Dry off with a clean towel. Do not put any substances on your body afterward--such as powder, lotion, or perfume--unless you are told to do so by your health care provider. Only use lotions that are recommended by the .  Put on clean clothes or pajamas.  If it is the night before your surgery, sleep in clean sheets.     During a sponge bath  Follow these steps when using CHG solution during a sponge bath (unless your health care provider gives you different instructions):  Use your normal soap and shampoo to wash your face and hair.  Pour the CHG onto a clean washcloth.  Starting at your neck, lather your body down to your toes. Make sure you follow these instructions:  If you will be having surgery, pay special attention to the part of your body where you will be having surgery. Scrub this area for at least 1 minute.  Do not use CHG on your head or face. If the solution gets into your ears or eyes, rinse them well with water.  Avoid your genital  area.  Avoid any areas of skin that have broken skin, cuts, or scrapes.  Scrub your back and under your arms. Make sure to wash skin folds.  Let the lather sit on your skin for 1-2 minutes or as long as told by your health care provider.  Using a different clean, wet washcloth, thoroughly rinse your entire body. Make sure that all body creases and crevices are rinsed well.  Dry off with a clean towel. Do not put any substances on your body afterward--such as powder, lotion, or perfume--unless you are told to do so by your health care provider. Only use lotions that are recommended by the .  Put on clean clothes or pajamas.  If it is the night before your surgery, sleep in clean sheets.  How to use CHG prepackaged cloths  Only use CHG cloths as told by your health care provider, and follow the instructions on the label.  Use the CHG cloth on clean, dry skin.  Do not use the CHG cloth on your head or face unless your health care provider tells you to.  When washing with the CHG cloth:  Avoid your genital area.  Avoid any areas of skin that have broken skin, cuts, or scrapes.  Before surgery    Follow these steps when using a CHG cloth to clean before surgery (unless your health care provider gives you different instructions):  Using the CHG cloth, vigorously scrub the part of your body where you will be having surgery. Scrub using a back-and-forth motion for 3 minutes. The area on your body should be completely wet with CHG when you are done scrubbing.  Do not rinse. Discard the cloth and let the area air-dry. Do not put any substances on the area afterward, such as powder, lotion, or perfume.  Put on clean clothes or pajamas.  If it is the night before your surgery, sleep in clean sheets.     For general bathing  Follow these steps when using CHG cloths for general bathing (unless your health care provider gives you different instructions).  Use a separate CHG cloth for each area of your body. Make sure  you wash between any folds of skin and between your fingers and toes. Wash your body in the following order, switching to a new cloth after each step:  The front of your neck, shoulders, and chest.  Both of your arms, under your arms, and your hands.  Your stomach and groin area, avoiding the genitals.  Your right leg and foot.  Your left leg and foot.  The back of your neck, your back, and your buttocks.  Do not rinse. Discard the cloth and let the area air-dry. Do not put any substances on your body afterward--such as powder, lotion, or perfume--unless you are told to do so by your health care provider. Only use lotions that are recommended by the .  Put on clean clothes or pajamas.  Contact a health care provider if:  Your skin gets irritated after scrubbing.  You have questions about using your solution or cloth.  You swallow any chlorhexidine. Call your local poison control center (1-322.456.1517 in the [place]  Get help right away if:  Your eyes itch badly, or they become very red or swollen.  Your skin itches badly and is red or swollen.  Your hearing changes.  You have trouble seeing.  You have swelling or tingling in your mouth or throat.  You have trouble breathing.  These symptoms may represent a serious problem that is an emergency. Do not wait to see if the symptoms will go away. Get medical help right away. Call your local emergency services (370 in the [place] Do not drive yourself to the hospital.  Summary  Chlorhexidine gluconate (CHG) is a germ-killing (antiseptic) solution that is used to clean the skin. Cleaning your skin with CHG may help to lower your risk for infection.  You may be given CHG to use for bathing. It may be in a bottle or in a prepackaged cloth to use on your skin. Carefully follow your health care provider's instructions and the instructions on the product label.  Do not use CHG if you have a chlorhexidine allergy.  Contact your health care provider if your skin gets  irritated after scrubbing.  This information is not intended to replace advice given to you by your health care provider. Make sure you discuss any questions you have with your health care provider.  Document Revised: 04/17/2023 Document Reviewed: 02/28/2022  [employer]vier Patient Education © 2023 [employer]

## 2025-01-04 LAB — MRSA SPEC QL CULT: NORMAL

## 2025-01-06 ENCOUNTER — NURSE TRIAGE (OUTPATIENT)
Dept: CALL CENTER | Facility: HOSPITAL | Age: 65
End: 2025-01-06
Payer: MEDICARE

## 2025-01-06 NOTE — TELEPHONE ENCOUNTER
"Calling Rosita Fontaine office to check on paperwork, told him office is closed today can check tomorrow. He verbalized understanding   Reason for Disposition   [1] Caller requesting NON-URGENT health information AND [2] PCP's office is the best resource    Additional Information   Negative: [1] Caller is not with the adult (patient) AND [2] reporting urgent symptoms   Negative: Lab result questions   Negative: Medication questions   Negative: Caller can't be reached by phone   Negative: Caller has already spoken to PCP or another triager   Negative: RN needs further essential information from caller in order to complete triage   Negative: Requesting regular office appointment   Negative: General information question, no triage required and triager able to answer question   Negative: Question about upcoming scheduled test, no triage required and triager able to answer question   Negative: [1] Caller is not with the adult (patient) AND [2] probable NON-URGENT symptoms   Negative: [1] Follow-up call to recent contact AND [2] information only call, no triage required   Negative: Health Information question, no triage required and triager able to answer question    Answer Assessment - Initial Assessment Questions  1. REASON FOR CALL or QUESTION: \"What is your reason for calling today?\" or \"How can I best help you?\" or \"What question do you have that I can help answer?\"      Chekcking on paperwork    Protocols used: Information Only Call - No Triage-ADULT-    "

## 2025-01-06 NOTE — TELEPHONE ENCOUNTER
RN calls with BP of 98/66 P 96, was running in the 80's/50's earlier today.  His metoprolol was changed from 25 mg bid to 50 mg bid.  Feels weak.  Dr. Fontaine contacted and orders that metoprolol be decreased back down to 25 mg bid, read back.  Caller updated to above.  Reason for Disposition  • [1] Caller has URGENT medicine question about med that PCP or specialist prescribed AND [2] triager unable to answer question    Additional Information  • Negative: [1] Intentional drug overdose AND [2] suicidal thoughts or ideas  • Negative: Drug overdose and triager unable to answer question  • Negative: Caller requesting a renewal or refill of a medicine patient is currently taking  • Negative: Caller requesting information unrelated to medicine  • Negative: Caller requesting information about COVID-19 Vaccine  • Negative: Caller requesting information about Emergency Contraception  • Negative: Caller requesting information about Combined Birth Control Pills  • Negative: Caller requesting information about Progestin Birth Control Pills  • Negative: Caller requesting information about Post-Op pain or medicines  • Negative: Caller requesting a prescription antibiotic (such as Penicillin) for Strep throat and has a positive culture result  • Negative: Caller requesting a prescription anti-viral med (such as Tamiflu) and has influenza (flu) symptoms  • Negative: Immunization reaction suspected  • Negative: Rash while taking a medicine or within 3 days of stopping it  • Negative: [1] Asthma and [2] having symptoms of asthma (cough, wheezing, etc.)  • Negative: [1] Symptom of illness (e.g., headache, abdominal pain, earache, vomiting) AND [2] more than mild  • Negative: Breastfeeding questions about mother's medicines and diet  • Negative: MORE THAN A DOUBLE DOSE of a prescription or over-the-counter (OTC) drug  • Negative: [1] DOUBLE DOSE (an extra dose or lesser amount) of prescription drug AND [2] any symptoms (e.g.,  "dizziness, nausea, pain, sleepiness)  • Negative: [1] DOUBLE DOSE (an extra dose or lesser amount) of over-the-counter (OTC) drug AND [2] any symptoms (e.g., dizziness, nausea, pain, sleepiness)  • Negative: Took another person's prescription drug  • Negative: [1] DOUBLE DOSE (an extra dose or lesser amount) of prescription drug AND [2] NO symptoms  (Exception: A double dose of antibiotics.)  • Negative: Diabetes drug error or overdose (e.g., took wrong type of insulin or took extra dose)  • Negative: [1] Prescription not at pharmacy AND [2] was prescribed by PCP recently (Exception: Triager has access to EMR and prescription is recorded there. Go to Home Care and confirm for pharmacy.)  • Negative: [1] Pharmacy calling with prescription question AND [2] triager unable to answer question    Answer Assessment - Initial Assessment Questions  1. NAME of MEDICINE: \"What medicine(s) are you calling about?\"      metoprolol  2. QUESTION: \"What is your question?\" (e.g., double dose of medicine, side effect)      Does Dr. Fontaine want to changed it back to 25 mg po bid due to hypotension?  3. PRESCRIBER: \"Who prescribed the medicine?\" Reason: if prescribed by specialist, call should be referred to that group.      Dr. Fontaine  4. SYMPTOMS: \"Do you have any symptoms?\" If Yes, ask: \"What symptoms are you having?\"  \"How bad are the symptoms (e.g., mild, moderate, severe)      yes  5. PREGNANCY:  \"Is there any chance that you are pregnant?\" \"When was your last menstrual period?\"      na    Protocols used: Medication Question Call-ADULT-AH    "

## 2025-01-09 ENCOUNTER — TELEPHONE (OUTPATIENT)
Dept: SURGERY | Facility: CLINIC | Age: 65
End: 2025-01-09
Payer: MEDICARE

## 2025-01-09 NOTE — TELEPHONE ENCOUNTER
Spoke with the patient to confirm the arrival time of 7am for surgery tomorrow. Patient voiced understanding. Also reminded the patient nothing to eat or drink after midnight. Patient confirmed surgery.    CBC  01/09

## 2025-01-10 ENCOUNTER — ANESTHESIA (OUTPATIENT)
Dept: PERIOP | Facility: HOSPITAL | Age: 65
End: 2025-01-10
Payer: MEDICARE

## 2025-01-10 ENCOUNTER — HOSPITAL ENCOUNTER (OUTPATIENT)
Facility: HOSPITAL | Age: 65
Setting detail: HOSPITAL OUTPATIENT SURGERY
Discharge: HOME | End: 2025-01-10
Attending: STUDENT IN AN ORGANIZED HEALTH CARE EDUCATION/TRAINING PROGRAM | Admitting: STUDENT IN AN ORGANIZED HEALTH CARE EDUCATION/TRAINING PROGRAM
Payer: MEDICARE

## 2025-01-10 ENCOUNTER — ANESTHESIA EVENT (OUTPATIENT)
Dept: PERIOP | Facility: HOSPITAL | Age: 65
End: 2025-01-10
Payer: MEDICARE

## 2025-01-10 VITALS
OXYGEN SATURATION: 92 % | DIASTOLIC BLOOD PRESSURE: 75 MMHG | HEART RATE: 100 BPM | RESPIRATION RATE: 16 BRPM | SYSTOLIC BLOOD PRESSURE: 104 MMHG | TEMPERATURE: 98.1 F

## 2025-01-10 DIAGNOSIS — K40.20 NON-RECURRENT BILATERAL INGUINAL HERNIA WITHOUT OBSTRUCTION OR GANGRENE: ICD-10-CM

## 2025-01-10 PROCEDURE — 25010000002 FENTANYL CITRATE (PF) 100 MCG/2ML SOLUTION: Performed by: NURSE ANESTHETIST, CERTIFIED REGISTERED

## 2025-01-10 PROCEDURE — 25010000002 PROPOFOL 10 MG/ML EMULSION: Performed by: NURSE ANESTHETIST, CERTIFIED REGISTERED

## 2025-01-10 PROCEDURE — 25010000002 DEXAMETHASONE PER 1 MG: Performed by: STUDENT IN AN ORGANIZED HEALTH CARE EDUCATION/TRAINING PROGRAM

## 2025-01-10 PROCEDURE — 49650 LAP ING HERNIA REPAIR INIT: CPT | Performed by: STUDENT IN AN ORGANIZED HEALTH CARE EDUCATION/TRAINING PROGRAM

## 2025-01-10 PROCEDURE — 25010000002 BUPIVACAINE (PF) 0.25 % SOLUTION 30 ML VIAL: Performed by: STUDENT IN AN ORGANIZED HEALTH CARE EDUCATION/TRAINING PROGRAM

## 2025-01-10 PROCEDURE — 25010000002 HEPARIN (PORCINE) PER 1000 UNITS: Performed by: STUDENT IN AN ORGANIZED HEALTH CARE EDUCATION/TRAINING PROGRAM

## 2025-01-10 PROCEDURE — S2900 ROBOTIC SURGICAL SYSTEM: HCPCS | Performed by: STUDENT IN AN ORGANIZED HEALTH CARE EDUCATION/TRAINING PROGRAM

## 2025-01-10 PROCEDURE — 25810000003 LACTATED RINGERS PER 1000 ML: Performed by: STUDENT IN AN ORGANIZED HEALTH CARE EDUCATION/TRAINING PROGRAM

## 2025-01-10 PROCEDURE — 25010000002 LIDOCAINE PF 2% 2 % SOLUTION: Performed by: NURSE ANESTHETIST, CERTIFIED REGISTERED

## 2025-01-10 PROCEDURE — C1781 MESH (IMPLANTABLE): HCPCS | Performed by: STUDENT IN AN ORGANIZED HEALTH CARE EDUCATION/TRAINING PROGRAM

## 2025-01-10 PROCEDURE — 25010000002 CEFAZOLIN PER 500 MG: Performed by: STUDENT IN AN ORGANIZED HEALTH CARE EDUCATION/TRAINING PROGRAM

## 2025-01-10 PROCEDURE — 25010000002 BUPIVACAINE 0.5 % SOLUTION 50 ML VIAL: Performed by: STUDENT IN AN ORGANIZED HEALTH CARE EDUCATION/TRAINING PROGRAM

## 2025-01-10 PROCEDURE — 25010000002 LIDOCAINE 1% - EPINEPHRINE 1:100000 1 %-1:100000 SOLUTION 20 ML VIAL: Performed by: STUDENT IN AN ORGANIZED HEALTH CARE EDUCATION/TRAINING PROGRAM

## 2025-01-10 PROCEDURE — 25010000002 LIDOCAINE 1 % SOLUTION 20 ML VIAL: Performed by: STUDENT IN AN ORGANIZED HEALTH CARE EDUCATION/TRAINING PROGRAM

## 2025-01-10 PROCEDURE — 25010000002 MORPHINE SULFATE (PF) 2 MG/ML SOLUTION 1 ML CARTRIDGE: Performed by: STUDENT IN AN ORGANIZED HEALTH CARE EDUCATION/TRAINING PROGRAM

## 2025-01-10 PROCEDURE — 25010000002 SUGAMMADEX 200 MG/2ML SOLUTION: Performed by: NURSE ANESTHETIST, CERTIFIED REGISTERED

## 2025-01-10 PROCEDURE — 25010000002 ONDANSETRON PER 1 MG: Performed by: NURSE ANESTHETIST, CERTIFIED REGISTERED

## 2025-01-10 DEVICE — IMPLANTABLE DEVICE: Type: IMPLANTABLE DEVICE | Site: INGUINAL | Status: FUNCTIONAL

## 2025-01-10 DEVICE — DEV CONTRL TISS STRATAFIX SPIRAL MNCRYL PLS SH 3/0 9IN UD: Type: IMPLANTABLE DEVICE | Site: ABDOMEN | Status: FUNCTIONAL

## 2025-01-10 DEVICE — 3DMAX MID ANATOMICAL MESH, 10 CM X 16 CM (4" X 6"), LARGE, RIGHT
Type: IMPLANTABLE DEVICE | Site: INGUINAL | Status: FUNCTIONAL
Brand: 3DMAX

## 2025-01-10 RX ORDER — NALOXONE HCL 0.4 MG/ML
0.4 VIAL (ML) INJECTION AS NEEDED
Status: DISCONTINUED | OUTPATIENT
Start: 2025-01-10 | End: 2025-01-10 | Stop reason: HOSPADM

## 2025-01-10 RX ORDER — MAGNESIUM HYDROXIDE 1200 MG/15ML
LIQUID ORAL AS NEEDED
Status: DISCONTINUED | OUTPATIENT
Start: 2025-01-10 | End: 2025-01-10 | Stop reason: HOSPADM

## 2025-01-10 RX ORDER — SODIUM CHLORIDE 0.9 % (FLUSH) 0.9 %
3 SYRINGE (ML) INJECTION AS NEEDED
Status: DISCONTINUED | OUTPATIENT
Start: 2025-01-10 | End: 2025-01-10 | Stop reason: HOSPADM

## 2025-01-10 RX ORDER — HYDROCODONE BITARTRATE AND ACETAMINOPHEN 10; 325 MG/1; MG/1
1 TABLET ORAL EVERY 4 HOURS PRN
Status: DISCONTINUED | OUTPATIENT
Start: 2025-01-10 | End: 2025-01-10 | Stop reason: HOSPADM

## 2025-01-10 RX ORDER — ROCURONIUM BROMIDE 10 MG/ML
INJECTION, SOLUTION INTRAVENOUS AS NEEDED
Status: DISCONTINUED | OUTPATIENT
Start: 2025-01-10 | End: 2025-01-10 | Stop reason: SURG

## 2025-01-10 RX ORDER — HEPARIN SODIUM 5000 [USP'U]/ML
5000 INJECTION, SOLUTION INTRAVENOUS; SUBCUTANEOUS EVERY 8 HOURS SCHEDULED
Status: DISCONTINUED | OUTPATIENT
Start: 2025-01-10 | End: 2025-01-10 | Stop reason: HOSPADM

## 2025-01-10 RX ORDER — ACETAMINOPHEN 325 MG/1
975 TABLET ORAL EVERY 8 HOURS
Start: 2025-01-10 | End: 2026-01-10

## 2025-01-10 RX ORDER — SODIUM CHLORIDE 0.9 % (FLUSH) 0.9 %
3 SYRINGE (ML) INJECTION EVERY 12 HOURS SCHEDULED
Status: DISCONTINUED | OUTPATIENT
Start: 2025-01-10 | End: 2025-01-10 | Stop reason: HOSPADM

## 2025-01-10 RX ORDER — ONDANSETRON 2 MG/ML
4 INJECTION INTRAMUSCULAR; INTRAVENOUS ONCE AS NEEDED
Status: DISCONTINUED | OUTPATIENT
Start: 2025-01-10 | End: 2025-01-10 | Stop reason: HOSPADM

## 2025-01-10 RX ORDER — PHENYLEPHRINE HCL IN 0.9% NACL 1 MG/10 ML
SYRINGE (ML) INTRAVENOUS AS NEEDED
Status: DISCONTINUED | OUTPATIENT
Start: 2025-01-10 | End: 2025-01-10 | Stop reason: SURG

## 2025-01-10 RX ORDER — PROPOFOL 10 MG/ML
VIAL (ML) INTRAVENOUS AS NEEDED
Status: DISCONTINUED | OUTPATIENT
Start: 2025-01-10 | End: 2025-01-10 | Stop reason: SURG

## 2025-01-10 RX ORDER — SODIUM CHLORIDE, SODIUM LACTATE, POTASSIUM CHLORIDE, CALCIUM CHLORIDE 600; 310; 30; 20 MG/100ML; MG/100ML; MG/100ML; MG/100ML
100 INJECTION, SOLUTION INTRAVENOUS CONTINUOUS
Status: DISCONTINUED | OUTPATIENT
Start: 2025-01-10 | End: 2025-01-10 | Stop reason: HOSPADM

## 2025-01-10 RX ORDER — HYDROCODONE BITARTRATE AND ACETAMINOPHEN 5; 325 MG/1; MG/1
1 TABLET ORAL EVERY 4 HOURS PRN
Status: DISCONTINUED | OUTPATIENT
Start: 2025-01-10 | End: 2025-01-10 | Stop reason: HOSPADM

## 2025-01-10 RX ORDER — SODIUM CHLORIDE 9 MG/ML
40 INJECTION, SOLUTION INTRAVENOUS AS NEEDED
Status: DISCONTINUED | OUTPATIENT
Start: 2025-01-10 | End: 2025-01-10 | Stop reason: HOSPADM

## 2025-01-10 RX ORDER — FLUMAZENIL 0.1 MG/ML
0.2 INJECTION INTRAVENOUS AS NEEDED
Status: DISCONTINUED | OUTPATIENT
Start: 2025-01-10 | End: 2025-01-10 | Stop reason: HOSPADM

## 2025-01-10 RX ORDER — LABETALOL HYDROCHLORIDE 5 MG/ML
5 INJECTION, SOLUTION INTRAVENOUS
Status: DISCONTINUED | OUTPATIENT
Start: 2025-01-10 | End: 2025-01-10 | Stop reason: HOSPADM

## 2025-01-10 RX ORDER — OXYCODONE HYDROCHLORIDE 5 MG/1
5 TABLET ORAL EVERY 8 HOURS PRN
Qty: 10 TABLET | Refills: 0 | Status: SHIPPED | OUTPATIENT
Start: 2025-01-10 | End: 2026-01-10

## 2025-01-10 RX ORDER — SODIUM CHLORIDE 0.9 % (FLUSH) 0.9 %
10 SYRINGE (ML) INJECTION AS NEEDED
Status: DISCONTINUED | OUTPATIENT
Start: 2025-01-10 | End: 2025-01-10 | Stop reason: HOSPADM

## 2025-01-10 RX ORDER — FENTANYL CITRATE 50 UG/ML
50 INJECTION, SOLUTION INTRAMUSCULAR; INTRAVENOUS
Status: DISCONTINUED | OUTPATIENT
Start: 2025-01-10 | End: 2025-01-10 | Stop reason: HOSPADM

## 2025-01-10 RX ORDER — LIDOCAINE HYDROCHLORIDE 10 MG/ML
0.5 INJECTION, SOLUTION EPIDURAL; INFILTRATION; INTRACAUDAL; PERINEURAL ONCE AS NEEDED
Status: DISCONTINUED | OUTPATIENT
Start: 2025-01-10 | End: 2025-01-10 | Stop reason: HOSPADM

## 2025-01-10 RX ORDER — SODIUM CHLORIDE 0.9 % (FLUSH) 0.9 %
3-10 SYRINGE (ML) INJECTION AS NEEDED
Status: DISCONTINUED | OUTPATIENT
Start: 2025-01-10 | End: 2025-01-10 | Stop reason: HOSPADM

## 2025-01-10 RX ORDER — ONDANSETRON 4 MG/1
4 TABLET, FILM COATED ORAL EVERY 8 HOURS PRN
Qty: 15 TABLET | Refills: 0 | Status: SHIPPED | OUTPATIENT
Start: 2025-01-10 | End: 2026-01-10

## 2025-01-10 RX ORDER — FENTANYL CITRATE 50 UG/ML
INJECTION, SOLUTION INTRAMUSCULAR; INTRAVENOUS AS NEEDED
Status: DISCONTINUED | OUTPATIENT
Start: 2025-01-10 | End: 2025-01-10 | Stop reason: SURG

## 2025-01-10 RX ORDER — SODIUM CHLORIDE, SODIUM LACTATE, POTASSIUM CHLORIDE, CALCIUM CHLORIDE 600; 310; 30; 20 MG/100ML; MG/100ML; MG/100ML; MG/100ML
1000 INJECTION, SOLUTION INTRAVENOUS CONTINUOUS
Status: DISCONTINUED | OUTPATIENT
Start: 2025-01-10 | End: 2025-01-10 | Stop reason: HOSPADM

## 2025-01-10 RX ORDER — LIDOCAINE HYDROCHLORIDE 20 MG/ML
INJECTION, SOLUTION EPIDURAL; INFILTRATION; INTRACAUDAL; PERINEURAL AS NEEDED
Status: DISCONTINUED | OUTPATIENT
Start: 2025-01-10 | End: 2025-01-10 | Stop reason: SURG

## 2025-01-10 RX ORDER — ONDANSETRON 2 MG/ML
INJECTION INTRAMUSCULAR; INTRAVENOUS AS NEEDED
Status: DISCONTINUED | OUTPATIENT
Start: 2025-01-10 | End: 2025-01-10 | Stop reason: SURG

## 2025-01-10 RX ADMIN — SODIUM CHLORIDE, POTASSIUM CHLORIDE, SODIUM LACTATE AND CALCIUM CHLORIDE 1000 ML: 600; 310; 30; 20 INJECTION, SOLUTION INTRAVENOUS at 08:35

## 2025-01-10 RX ADMIN — PROPOFOL 150 MG: 10 INJECTION, EMULSION INTRAVENOUS at 09:20

## 2025-01-10 RX ADMIN — FENTANYL CITRATE 100 MCG: 50 INJECTION, SOLUTION INTRAMUSCULAR; INTRAVENOUS at 09:17

## 2025-01-10 RX ADMIN — Medication 200 MCG: at 09:36

## 2025-01-10 RX ADMIN — SUGAMMADEX 200 MG: 100 INJECTION, SOLUTION INTRAVENOUS at 10:21

## 2025-01-10 RX ADMIN — ONDANSETRON 4 MG: 2 INJECTION INTRAMUSCULAR; INTRAVENOUS at 10:21

## 2025-01-10 RX ADMIN — Medication 100 MCG: at 09:30

## 2025-01-10 RX ADMIN — ROCURONIUM BROMIDE 40 MG: 10 INJECTION, SOLUTION INTRAVENOUS at 09:20

## 2025-01-10 RX ADMIN — CEFAZOLIN 2000 MG: 2 INJECTION, POWDER, FOR SOLUTION INTRAMUSCULAR; INTRAVENOUS at 09:23

## 2025-01-10 RX ADMIN — HEPARIN SODIUM 5000 UNITS: 5000 INJECTION, SOLUTION INTRAVENOUS; SUBCUTANEOUS at 09:02

## 2025-01-10 RX ADMIN — LIDOCAINE HYDROCHLORIDE 100 MG: 20 INJECTION, SOLUTION EPIDURAL; INFILTRATION; INTRACAUDAL; PERINEURAL at 09:20

## 2025-01-10 NOTE — ANESTHESIA PREPROCEDURE EVALUATION
Anesthesia Evaluation     Patient summary reviewed   no history of anesthetic complications:   NPO Solid Status: > 8 hours  NPO Liquid Status: > 8 hours           Airway   Mallampati: II  Dental    (+) edentulous    Pulmonary    (+) a smoker Former, COPD, asthma,home oxygen  (-) sleep apnea    ROS comment: Lung nodule  Cardiovascular   Exercise tolerance: good (4-7 METS)    (+) dysrhythmias Atrial Fib  (-) hyperlipidemia    ROS comment: Pulmonary HTN    Neuro/Psych  (+) tremors  (-) seizures, TIA, CVA  GI/Hepatic/Renal/Endo    (-) liver disease, no renal disease, diabetes    Musculoskeletal     Abdominal    Substance History      OB/GYN          Other                      Anesthesia Plan    ASA 3     general     (Have requested RN with patient to secure his home 02 tank as patient forgot it. Room air 02 88-91%, wears 02 prn)  intravenous induction     Anesthetic plan, risks, benefits, and alternatives have been provided, discussed and informed consent has been obtained with: patient.    CODE STATUS:

## 2025-01-10 NOTE — ANESTHESIA PROCEDURE NOTES
Airway  Urgency: elective    Date/Time: 1/10/2025 9:22 AM  Airway not difficult    General Information and Staff    Patient location during procedure: OR  CRNA/CAA: Paola Ahumada CRNA    Indications and Patient Condition  Indications for airway management: airway protection    Preoxygenated: yes  Mask difficulty assessment: 1 - vent by mask    Final Airway Details  Final airway type: endotracheal airway      Successful airway: ETT  Cuffed: yes   Successful intubation technique: direct laryngoscopy  Facilitating devices/methods: intubating stylet  Endotracheal tube insertion site: oral  Blade: Tomas  Blade size: 3.5.  ETT size (mm): 7.5  Cormack-Lehane Classification: grade I - full view of glottis  Placement verified by: chest auscultation and capnometry   Cuff volume (mL): 10  Measured from: lips  ETT/EBT  to lips (cm): 22  Number of attempts at approach: 1  Assessment: lips, teeth, and gum same as pre-op and atraumatic intubation

## 2025-01-10 NOTE — ANESTHESIA POSTPROCEDURE EVALUATION
Patient: Bin Oh    Procedure Summary       Date: 01/10/25 Room / Location: [facility] OR  /  PAD OR    Anesthesia Start: 0917 Anesthesia Stop: 1038    Procedure: INGUINAL HERNIA BILATERAL REPAIR LAPAROSCOPIC WITH DAVINCI ROBOT WITH MESH (Bilateral: Abdomen) Diagnosis:       Non-recurrent bilateral inguinal hernia without obstruction or gangrene      (Non-recurrent bilateral inguinal hernia without obstruction or gangrene [K40.20])    Surgeons: Annemarie Whitfield MD Provider: Paola Ahumada CRNA    Anesthesia Type: general ASA Status: 3            Anesthesia Type: general    Vitals  Vitals Value Taken Time   BP 99/56 01/10/25 1122   Temp 98.1 °F (36.7 °C) 01/10/25 1038   Pulse 82 01/10/25 1122   Resp 16 01/10/25 1122   SpO2 94 % 01/10/25 1122           Post Anesthesia Care and Evaluation    Patient location during evaluation: PACU  Patient participation: complete - patient participated  Level of consciousness: awake  Pain management: adequate    Airway patency: patent  Anesthetic complications: No anesthetic complications  PONV Status: none  Cardiovascular status: acceptable  Respiratory status: acceptable  Hydration status: acceptable    Comments: /76   Pulse 98   Temp 98.1 °F (36.7 °C) (Temporal)   Resp 16   SpO2 91%   Patient discharged from PACU based on Josiah score. See RN notes for further details.

## 2025-01-10 NOTE — OP NOTE
Laparoscopic Robotic-Assisted Bilateral Inguinal Hernia Repair with Mesh:     Patient: Bin Oh  MRN: [id number]    YOB: 1960  Age: 64 y.o.  Sex: male  Unit:  [facility] OR Room/Bed: [facility] OR/MAIN OR Location: [facility]    Admitting Physician: JEYSON MARVIN    Primary Care Physician: Harry Fontaine MD             INDICATIONS: This is a 64 y.o. male who presents with bilateral inguinal hernias. These hernias are symptomatic and limiting the patient's daily activities. After a discussion of risks, benefits and alternatives (see H&P), consent was obtained.     DATE OF OPERATION: 1/10/2025     Surgeons and Role:     * Jeyson Marvin MD - Primary  Margarette Richard PA-C - assist     ANESTHESIA: General     PREOPERATIVE DIAGNOSIS: Non-recurrent bilateral inguinal hernia without obstruction or gangrene [K40.20]    POSTOPERATIVE DIAGNOSIS: Same, right indirect inguinal hernia and left direct inguinal hernia     PROCEDURES PERFORMED:    (1) Laparoscopic, robotic-assisted bilateral inguinal hernia repair with mesh     PROCEDURE DETAILS:   After informed consent was obtained, the patient was brought to the operating room. Preoperative antibiotics and subcutaneous heparin were administered. General anesthesia was induced. The abdomen was prepped with ChloraPrep and draped in a sterile fashion. A time-out was performed verifying the correct patient, procedure, and side. An 8 mm incision was made superior to the umbilicus. The Veress was inserted and the abdomen was insufflated to 15 mmHg. An 8 mm trocar was then inserted. A laparoscopic TAP block was performed with my deep local.   Two 8 mm trocars were placed on each side laterally approximately 10 cm lateral to the umbilical port. The patient had bilateral inguinal hernias.  The bed was flexed. We then docked the robot in the usual fashion from the left side. We identified the large bilateral inguinal hernias. The hook was placed in the right  arm, and a ProGrasp was placed in the left arm. The peritoneum was opened high on the abdominal wall.There was a large indirect right inguinal hernia containing small bowel and colon. The peritoneum was taken down and the hernia sac was reduced back into the abdomen.  No cord lipoma. The cord was identified and preserved. The pubic tubercle was dissected clean as was Fei's ligament on the left side. Once the space was completely dissected out, a large right 3D max mesh was placed into the abdomen. Then using a MegaCut needle , we positioned the mesh in the inguinal space, covering the direct, indirect, and femoral spaces down to the level of the medial takeoff of the Vas. It fit very nicely in the space. A 2-0 Vicryl suture was placed in the abdomen and it was used to suture the mesh in place - 2 sutures on Fei's ligament, a suture medial to the inferior epigastric vessels, a suture lateral to the inferior epigastric vessels, and one suture to cover the direct space.   Turning my attention to the left, there was a large direct left inguinal hernia. The peritoneum was taken down and the hernia sac was reduced back into the abdomen.  No cord lipoma. The cord was identified and preserved. The pubic tubercle was dissected clean as was Fei's ligament on the left side. Once the space was completed dissected out, a large left 3D max mesh was placed into the abdomen. Then using a MegaCut needle , we positioned the mesh in the inguinal space, covering the direct, indirect, and femoral spaces down to the level of the medial takeoff of the Vas. It fit very nicely in the space. A 2-0 Vicryl suture was placed in the abdomen and it was used to suture the mesh in place - 2 sutures on Fei's ligament, a suture medial to the inferior epigastric vessels, a suture lateral to the inferior epigastric vessels, and one suture to cover the direct space.   The peritoneum was closed with two 3-0 stratafix absorbable  sutures.  The needles were removed from the abdomen and needle counts were correct. The robot was undocked, the trocars were removed, and the abdomen was desufflated.  The skin of all three incisions was closed with a 4-0 Monocryl suture. Skin glue was placed to all incisions. The patient was transported to PACU in stable condition.     Margarette Richard PA-C was responsible for performing the following activities: Retraction, Suturing, Closing, Placing Dressing, and exchanging robotic instruments  and their skilled assistance was necessary for the success of this case.       Findings: right indirect inguinal hernia containing small bowel and colon, left direct inguinal hernia   Estimated Blood Loss:  20 mL   Complications: None apparent            Specimens: None      Disposition: PACU - hemodynamically stable.           Condition: stable    Annemarie Whitfield MD  12/11/2024

## 2025-01-10 NOTE — DISCHARGE INSTRUCTIONS
Wound:   - you have skin glue on your incisions. Okay to shower tomorrow.   - Leave skin glue in place, it should slowly fall off over 2 weeks   - No swimming/soaking/bathing x 2 weeks to allow incisions to heal.     Activity:   - Activity as tolerated. NO heavy lifting x 4 weeks - no more than 25lb at a time.   - No driving or operating machinery on narcotic pain medication.     Pain medication:   - Take 1000mg of tylenol every 8 hours for 3 days. After three days, take it prn.   - Take 600mg of ibuprofen (motrin) every 8 hours for 3 days. After three days, take it prn.   - You have a prescription for a narcotic. It will be roxicodone 5mg tabs. Take these only as needed after you have taken the tylenol and ibuprofen. If you are taking the roxicodone, make sure to take a stool softener (colace) with it as it can cause constipation.   - The narcotic may make you nauseated, you will have a prescription for zofran in case of nausea.     Follow up:   - make an appointment to see Margarette Richard PA-C  in 2 weeks  - If you have any concerns before then, call me office at [phone]

## 2025-01-13 ENCOUNTER — TELEPHONE (OUTPATIENT)
Dept: SURGERY | Facility: CLINIC | Age: 65
End: 2025-01-13
Payer: MEDICARE

## 2025-01-13 NOTE — TELEPHONE ENCOUNTER
Called and spoke with patient. Patient scheduled with Dr. Rizvi on Friday 1/15/2021, 1100, AGT   Pt called and stated that he was having some pain in his groin area and his leg was giving out on him. I spoke with pt and explained that groin pain was normal and that he needed to alternate Tylenol and Ibuprofen, and he stated that he is on Xarelto could not take Tylenol or Ibuprofen due to it made him have blood in his urine. I told him that he needed to take his pain medication that was prescribed to him and use a heating pad. The leg weakness he needed to call his PCP about that. Pt verbalized understanding. 01/13/2025 LANG

## 2025-01-23 ENCOUNTER — TELEPHONE (OUTPATIENT)
Dept: CARDIOLOGY | Facility: CLINIC | Age: 65
End: 2025-01-23

## 2025-01-23 NOTE — TELEPHONE ENCOUNTER
Caller: Bin Oh    Relationship: Self    Best call back number: [phone] (home)      What is the best time to reach you: ANYTIME     Who are you requesting to speak with (clinical staff, provider,  specific staff member):          What was the call regarding: PATIENT WOULD LIKE TO KNOW IF THE OFFICE HAS GOT HIS APPROVAL FROM THE [employer] OFFICE FOR HIS ECHO TO BE COVERED.

## 2025-01-28 ENCOUNTER — HOSPITAL ENCOUNTER (OUTPATIENT)
Dept: CARDIOLOGY | Facility: HOSPITAL | Age: 65
Discharge: HOME | End: 2025-01-28
Admitting: INTERNAL MEDICINE
Payer: OTHER MISCELLANEOUS

## 2025-01-28 ENCOUNTER — TELEPHONE (OUTPATIENT)
Dept: CARDIOLOGY | Facility: CLINIC | Age: 65
End: 2025-01-28
Payer: MEDICARE

## 2025-01-28 VITALS
DIASTOLIC BLOOD PRESSURE: 78 MMHG | HEIGHT: 70 IN | SYSTOLIC BLOOD PRESSURE: 104 MMHG | WEIGHT: 178 LBS | BODY MASS INDEX: 25.48 KG/M2

## 2025-01-28 LAB
AORTIC ARCH: 1.7 CM
ASCENDING AORTA: 3.4 CM
AV MEAN PRESS GRAD SYS DOP V1V2: 1.56 MMHG
AV VMAX SYS DOP: 84.8 CM/SEC
BH CV ECHO MEAS - AO MAX PG: 2.9 MMHG
BH CV ECHO MEAS - AO ROOT DIAM: 3.3 CM
BH CV ECHO MEAS - AO V2 VTI: 16.2 CM
BH CV ECHO MEAS - AVA(I,D): 3.7 CM2
BH CV ECHO MEAS - EDV(CUBED): 122.8 ML
BH CV ECHO MEAS - EDV(MOD-SP2): 58.3 ML
BH CV ECHO MEAS - EDV(MOD-SP4): 46.9 ML
BH CV ECHO MEAS - EF(MOD-SP2): 63.5 %
BH CV ECHO MEAS - EF(MOD-SP4): 56.8 %
BH CV ECHO MEAS - ESV(CUBED): 34 ML
BH CV ECHO MEAS - ESV(MOD-SP2): 21.3 ML
BH CV ECHO MEAS - ESV(MOD-SP4): 20.3 ML
BH CV ECHO MEAS - FS: 34.8 %
BH CV ECHO MEAS - IVS/LVPW: 0.88 CM
BH CV ECHO MEAS - IVSD: 1.13 CM
BH CV ECHO MEAS - LA DIMENSION: 4.3 CM
BH CV ECHO MEAS - LAT PEAK E' VEL: 12.6 CM/SEC
BH CV ECHO MEAS - LV DIASTOLIC VOL/BSA (35-75): 23.6 CM2
BH CV ECHO MEAS - LV MASS(C)D: 233.6 GRAMS
BH CV ECHO MEAS - LV MAX PG: 2.16 MMHG
BH CV ECHO MEAS - LV MEAN PG: 1.05 MMHG
BH CV ECHO MEAS - LV SYSTOLIC VOL/BSA (12-30): 10.2 CM2
BH CV ECHO MEAS - LV V1 MAX: 73.2 CM/SEC
BH CV ECHO MEAS - LV V1 VTI: 14.4 CM
BH CV ECHO MEAS - LVIDD: 5 CM
BH CV ECHO MEAS - LVIDS: 3.2 CM
BH CV ECHO MEAS - LVOT AREA: 4.1 CM2
BH CV ECHO MEAS - LVOT DIAM: 2.3 CM
BH CV ECHO MEAS - LVPWD: 1.28 CM
BH CV ECHO MEAS - MED PEAK E' VEL: 10 CM/SEC
BH CV ECHO MEAS - MV DEC TIME: 0.16 SEC
BH CV ECHO MEAS - MV E MAX VEL: 103.4 CM/SEC
BH CV ECHO MEAS - MV MAX PG: 2.9 MMHG
BH CV ECHO MEAS - MV MEAN PG: 1.07 MMHG
BH CV ECHO MEAS - MV V2 VTI: 17.1 CM
BH CV ECHO MEAS - MVA(VTI): 3.5 CM2
BH CV ECHO MEAS - PA V2 MAX: 79.3 CM/SEC
BH CV ECHO MEAS - PI END-D VEL: 164.7 CM/SEC
BH CV ECHO MEAS - RAP SYSTOLE: 8 MMHG
BH CV ECHO MEAS - RV MAX PG: 1.85 MMHG
BH CV ECHO MEAS - RV V1 MAX: 68.1 CM/SEC
BH CV ECHO MEAS - RV V1 VTI: 13.1 CM
BH CV ECHO MEAS - RVDD: 2.5 CM
BH CV ECHO MEAS - RVSP: 28.8 MMHG
BH CV ECHO MEAS - SV(LVOT): 59.5 ML
BH CV ECHO MEAS - SV(MOD-SP2): 37 ML
BH CV ECHO MEAS - SV(MOD-SP4): 26.6 ML
BH CV ECHO MEAS - SVI(LVOT): 29.9 ML/M2
BH CV ECHO MEAS - SVI(MOD-SP2): 18.6 ML/M2
BH CV ECHO MEAS - SVI(MOD-SP4): 13.4 ML/M2
BH CV ECHO MEAS - TAPSE (>1.6): 2.3 CM
BH CV ECHO MEAS - TR MAX PG: 20.8 MMHG
BH CV ECHO MEAS - TR MAX VEL: 228 CM/SEC
BH CV ECHO MEASUREMENTS AVERAGE E/E' RATIO: 9.15
BH CV XLRA - RV BASE: 3.5 CM
BH CV XLRA - RV LENGTH: 5.3 CM
BH CV XLRA - RV MID: 2.16 CM
BH CV XLRA - TDI S': 12 CM/SEC
IVRT: 61 MS
LEFT ATRIUM VOLUME INDEX: 42 ML/M2
LEFT ATRIUM VOLUME: 77 ML
LV EF BIPLANE MOD: 59 %

## 2025-01-28 PROCEDURE — 93306 TTE W/DOPPLER COMPLETE: CPT | Performed by: INTERNAL MEDICINE

## 2025-01-28 PROCEDURE — 93306 TTE W/DOPPLER COMPLETE: CPT

## 2025-01-28 NOTE — TELEPHONE ENCOUNTER
----- Message from Lenny Simmons sent at 1/28/2025  3:20 PM CST -----  Please let him know that the echo shows normal systolic function of his right and left ventricle (pump function of his heart).  There is some dilation of both his left and right atria which is likely due to a combination of factors.  There is no evidence of congestive heart failure.

## 2025-01-30 ENCOUNTER — OFFICE VISIT (OUTPATIENT)
Dept: SURGERY | Facility: CLINIC | Age: 65
End: 2025-01-30
Payer: MEDICARE

## 2025-01-30 VITALS
BODY MASS INDEX: 25.48 KG/M2 | SYSTOLIC BLOOD PRESSURE: 98 MMHG | HEIGHT: 70 IN | DIASTOLIC BLOOD PRESSURE: 58 MMHG | WEIGHT: 178 LBS

## 2025-01-30 DIAGNOSIS — Z09 S/P BILATERAL INGUINAL HERNIA REPAIR, FOLLOW-UP EXAM: Primary | ICD-10-CM

## 2025-01-30 DIAGNOSIS — K40.20 NON-RECURRENT BILATERAL INGUINAL HERNIA WITHOUT OBSTRUCTION OR GANGRENE: ICD-10-CM

## 2025-01-30 DIAGNOSIS — Z87.19 S/P BILATERAL INGUINAL HERNIA REPAIR, FOLLOW-UP EXAM: Primary | ICD-10-CM

## 2025-01-30 PROCEDURE — 1160F RVW MEDS BY RX/DR IN RCRD: CPT

## 2025-01-30 PROCEDURE — 99024 POSTOP FOLLOW-UP VISIT: CPT

## 2025-01-30 PROCEDURE — 1159F MED LIST DOCD IN RCRD: CPT

## 2025-01-30 NOTE — PROGRESS NOTES
"Patient: Bin Oh    YOB: 1960    Date: 01/30/2025    Primary Care Provider: Harry Fontaine MD    Vital Signs:   Vitals:    01/30/25 0929   BP: 98/58   Weight: 80.7 kg (178 lb)   Height: 177.8 cm (70\")       Overall doing well s/p bilateral inguinal hernia repair on 1/10/25 by Dr. Whitfield. No fevers. Tolerating diet. Energy improving. Pain improving. No troubles with bowel or bladder function. Wounds healing well. Repair intact.    Results Review:   Pathology and operative findings reviewed with patient.        Assessment / Plan:  It has been reiterated that the patient should limit strenuous activity during the post op period and limit lifting to <35 pounds for the first four weeks post op then slowly return to normal.    Diagnoses and all orders for this visit:    1. S/P bilateral inguinal hernia repair, follow-up exam (Primary)    2. Non-recurrent bilateral inguinal hernia without obstruction or gangrene        Bin Oh is 3 weeks s/p bilateral inguinal hernia repair. He is overall doing well. At this time, he is able to return to normal activity at 4 weeks post op. This is around 2/7/25. He voiced understanding of this. He will call for an appointment if he has any new problems or concerns. He is agreeable to the plan.     Follow up:     Return if symptoms worsen or fail to improve.      Electronically signed by Margarette Richard PA-C  01/30/25  09:54 CST                  "

## 2025-01-31 NOTE — TELEPHONE ENCOUNTER
Pharmacy sent a request for refills on Albuterol nebulizer solution. Refill request routed to [facility].  Rx Refill Note  Requested Prescriptions     Pending Prescriptions Disp Refills    albuterol (PROVENTIL) (2.5 MG/3ML) 0.083% nebulizer solution [Pharmacy Med Name: ALBUTEROL SULFATE 0.083% NEBULIZED SOLN] 360 mL 11     Sig: TAKE 2.5 MG BY NEBULIZATION EVERY 4 (FOUR) HOURS AS NEEDED FOR WHEEZING.      Last office visit with prescribing clinician: 11/4/2024   Last telemedicine visit with prescribing clinician: Visit date not found   Next office visit with prescribing clinician: 5/5/2025                         Would you like a call back once the refill request has been completed: [] Yes [] No    If the office needs to give you a call back, can they leave a voicemail: [] Yes [] No    Vignesh Pacheco CMA  11/21/24, 09:49 CST   Him[employer]He

## 2025-02-21 ENCOUNTER — OFFICE VISIT (OUTPATIENT)
Dept: CARDIOLOGY | Facility: CLINIC | Age: 65
End: 2025-02-21

## 2025-02-21 VITALS
DIASTOLIC BLOOD PRESSURE: 73 MMHG | BODY MASS INDEX: 22.9 KG/M2 | SYSTOLIC BLOOD PRESSURE: 118 MMHG | OXYGEN SATURATION: 98 % | WEIGHT: 160 LBS | HEART RATE: 83 BPM | HEIGHT: 70 IN

## 2025-02-21 DIAGNOSIS — Z79.01 CHRONIC ANTICOAGULATION: ICD-10-CM

## 2025-02-21 DIAGNOSIS — I48.11 LONGSTANDING PERSISTENT ATRIAL FIBRILLATION: Primary | ICD-10-CM

## 2025-02-21 DIAGNOSIS — R60.0 BILATERAL LOWER EXTREMITY EDEMA: ICD-10-CM

## 2025-02-21 NOTE — PROGRESS NOTES
Reason for Visit: cardiovascular follow up.    HPI:  Bin Oh is a 64 y.o. male is here today for follow-up.  He was seen in cardiology consultation on 12/10/2024 due to tachycardia.  He underwent inguinal hernia repair on 1/10/2025.  The swelling in his legs is now improved.  He feels much better and the pain has improved.  It improved after surgery about 6 weeks ago.  He was seen by EP at [facility] and ablation and Watchman device was recommended.  He is trying to decide if he wants to do it or not.      Previous Cardiac Testing and Procedures:  -Stress echo (4/6/2015) low risk for ischemia  -Holter monitor (7/3/2018) atrial fibrillation with an average heart rate of 63 bpm, no significant pauses, rare PVCs  -Echo (5/22/2023) EF 65%, mild LVH, diastolic dysfunction, mildly reduced RV systolic function, mild to moderate RV dilation, mild LA dilation  -EKG (11/26/2024) atrial fibrillation, heart rate 93 bpm, LAFB  -Echo (1/28/2025) EF 56-60%, normal RV size and function, mild biatrial enlargement, no significant valve dysfunction, normal RVSP     Lab data:  -BMP (11/26/2024) creatinine 0.2, potassium 4.4, sodium 136  -proBNP (11/26/2024) 551  -proBNP (1/3/2025) creatinine 0.79, potassium 4.1, sodium 140    Patient Active Problem List   Diagnosis    Shortness of breath    Nocturnal hypoxemia    COPD, severe    Scarring of lung    Cigarette nicotine dependence without complication    Lung nodules    Hypoxemia requiring supplemental oxygen    Longstanding persistent atrial fibrillation    Bronchitis    Centrilobular emphysema    Cutaneous abscess of buttock    Perianal abscess    Acute non-recurrent sinusitis    Chronic anticoagulation    Non-recurrent bilateral inguinal hernia without obstruction or gangrene    Bilateral lower extremity edema       Social History     Tobacco Use    Smoking status: Former     Current packs/day: 0.00     Average packs/day: 0.3 packs/day for 51.7 years (15.0 ttl pk-yrs)     Types:  Cigarettes     Start date: 1976     Quit date: 2024     Years since quittin.2     Passive exposure: Current    Smokeless tobacco: Never    Tobacco comments:     No smoking for over 50 days, taking chantix   Vaping Use    Vaping status: Never Used   Substance Use Topics    Alcohol use: Never    Drug use: Never       Family History   Problem Relation Age of Onset    Heart disease Mother     Arrhythmia Mother     Heart disease Father     Heart disease Sister     No Known Problems Brother     No Known Problems Maternal Aunt     No Known Problems Maternal Uncle     No Known Problems Paternal Aunt     No Known Problems Paternal Uncle     No Known Problems Maternal Grandmother     No Known Problems Maternal Grandfather     No Known Problems Paternal Grandmother     No Known Problems Paternal Grandfather     Asthma Neg Hx     Cancer Neg Hx     Diabetes Neg Hx     Emphysema Neg Hx     Heart failure Neg Hx     Hypertension Neg Hx        The following portions of the patient's history were reviewed and updated as appropriate: allergies, current medications, past family history, past medical history, past social history, past surgical history, and problem list.      Current Outpatient Medications:     acetaminophen (Tylenol) 325 MG tablet, Take 3 tablets by mouth Every 8 (Eight) Hours. Take every 8 hours for 3 days then take prn as needed., Disp: , Rfl:     albuterol (PROVENTIL) (2.5 MG/3ML) 0.083% nebulizer solution, TAKE 2.5 MG BY NEBULIZATION EVERY 4 (FOUR) HOURS AS NEEDED FOR WHEEZING., Disp: 360 mL, Rfl: 0    albuterol sulfate  (90 Base) MCG/ACT inhaler, Inhale 2 puffs Every 4 (Four) Hours. Indications: ACUTE EXACERBATION OF COPD (INACTIVE), Disp: 54 g, Rfl: 3    benzonatate (TESSALON) 200 MG capsule, Take 1 capsule by mouth 2 (Two) Times a Day As Needed for Cough. Indications: Cough, Disp: , Rfl:     budesonide-formoterol (SYMBICORT) 160-4.5 MCG/ACT inhaler, Inhale 2 puffs 2 (Two) Times a Day. Rinse  and spit after using., Disp: 2 inhaler, Rfl: 0    bumetanide (BUMEX) 0.5 MG tablet, Take 1 tablet by mouth Daily., Disp: , Rfl:     Cyanocobalamin (Vitamin B-12) 5000 MCG sublingual tablet, Take 1 tablet by mouth Daily. Indications: Inadequate Vitamin B12, Disp: , Rfl:     diazePAM (VALIUM) 10 MG tablet, Take 1 tablet by mouth Every 6 (Six) Hours As Needed for Anxiety. Indications: Feeling Anxious, Disp: , Rfl:     dilTIAZem HCl Coated Beads (CARDIZEM CD PO), Take 360 mg by mouth Daily. Indications: Decreased Blood Pressure, Disp: , Rfl:     metoprolol tartrate (LOPRESSOR) 50 MG tablet, Take 1 tablet by mouth 2 (Two) Times a Day., Disp: 60 tablet, Rfl: 11    naloxone (NARCAN) 4 MG/0.1ML nasal spray, Call 911. Don't prime. [place] in 1 nostril for overdose. Repeat in 2-3 minutes in other nostril if no or minimal breathing/responsiveness., Disp: 2 each, Rfl: 0    O2 (OXYGEN), Inhale 3 L/min At Night As Needed (shortness of breath). Indications: Respiratory Failure, Disp: , Rfl:     ondansetron (Zofran) 4 MG tablet, Take 1 tablet by mouth Every 8 (Eight) Hours As Needed for Nausea or Vomiting., Disp: 15 tablet, Rfl: 0    oxyCODONE (Roxicodone) 5 MG immediate release tablet, Take 1 tablet by mouth Every 8 (Eight) Hours As Needed for Severe Pain., Disp: 10 tablet, Rfl: 0    POTASSIUM GLUCONATE PO, Take 1 tablet/day by mouth Daily. Indications: Cardiac Failure, Disp: , Rfl:     rivaroxaban (XARELTO) 20 MG tablet, Take 1 tablet by mouth Daily. Indications: Atrial Fibrillation, Disp: , Rfl:     sildenafil (VIAGRA) 100 MG tablet, Take 1 tablet by mouth Daily As Needed for Erectile Dysfunction., Disp: , Rfl:     tamsulosin (Flomax) 0.4 MG capsule 24 hr capsule, Take 1 capsule by mouth Daily. Indications: Chronic Prostate Gland Inflammation, Disp: , Rfl:     tiotropium bromide monohydrate (SPIRIVA RESPIMAT) 2.5 MCG/ACT aerosol solution inhaler, Inhale 2 puffs Daily., Disp: , Rfl:     traZODone (DESYREL) 50 MG tablet, Take 2  "tablets by mouth Every Night. prn  Indications: Trouble Sleeping, Disp: , Rfl:     varenicline (CHANTIX) 0.5 MG tablet, Take 2 tablets by mouth Daily. Indications: Treatment to Stop Smoking, Disp: 180 tablet, Rfl: 3    Review of Systems   Constitutional: Negative for chills and fever.   Cardiovascular:  Negative for chest pain and paroxysmal nocturnal dyspnea.   Respiratory:  Negative for cough and shortness of breath.    Hematologic/Lymphatic: Bruises/bleeds easily.   Skin:  Negative for rash.   Gastrointestinal:  Negative for abdominal pain and heartburn.   Neurological:  Negative for dizziness and numbness.       Objective   /73 (BP Location: Left arm, Patient Position: Sitting, Cuff Size: Adult)   Pulse 83   Ht 177.8 cm (70\")   Wt 72.6 kg (160 lb)   SpO2 98%   BMI 22.96 kg/m²   Constitutional:       Appearance: Well-developed.   HENT:      Head: Normocephalic and atraumatic.   Pulmonary:      Effort: Pulmonary effort is normal.      Breath sounds: Wheezing present.   Cardiovascular:      Normal rate. Irregularly irregular rhythm.   Edema:     Peripheral edema present.     Pretibial: bilateral 2+ edema of the pretibial area.     Ankle: bilateral 2+ edema of the ankle.  Skin:     General: Skin is warm and dry.      Findings: Bruising present.   Neurological:      Mental Status: Alert and oriented to person, place, and time.       Procedures      ICD-10-CM ICD-9-CM   1. Longstanding persistent atrial fibrillation  I48.11 427.31   2. Chronic anticoagulation  Z79.01 V58.61   3. Bilateral lower extremity edema  R60.0 782.3         Assessment/Plan:  1.  Persistent atrial fibrillation: Heart rate is normal today.  Seen by EP at [facility] and combined ablation and Watchman procedure was recommended.  This is very reasonable.  Continue metoprolol, diltiazem, and Xarelto.       2.  Chronic anticoagulation: With Xarelto.     3.  Lower extremity edema: Patient reports this has improved following hernia surgery " and is now mild to moderate and he notes resolves completely when he elevates his legs.  Continue compression stockings, leg elevation, and Bumex.

## 2025-02-21 NOTE — LETTER
February 21, 2025     Harry Fontaine MD  [address]  [address]  [place] [address]    Patient: Bin Oh   YOB: 1960   Date of Visit: 2/21/2025       Dear Harry Fontaine MD    Bin Oh was in my office today. Below is a copy of my note.    If you have questions, please do not hesitate to call me. I look forward to following Bin along with you.         Sincerely,        Lenny Simmons MD        CC: No Recipients      Reason for Visit: cardiovascular follow up.    HPI:  Bin Oh is a 64 y.o. male is here today for follow-up.  He was seen in cardiology consultation on 12/10/2024 due to tachycardia.  He underwent inguinal hernia repair on 1/10/2025.  The swelling in his legs is now improved.  He feels much better and the pain has improved.  It improved after surgery about 6 weeks ago.  He was seen by EP at [facility] and ablation and Watchman device was recommended.  He is trying to decide if he wants to do it or not.      Previous Cardiac Testing and Procedures:  -Stress echo (4/6/2015) low risk for ischemia  -Holter monitor (7/3/2018) atrial fibrillation with an average heart rate of 63 bpm, no significant pauses, rare PVCs  -Echo (5/22/2023) EF 65%, mild LVH, diastolic dysfunction, mildly reduced RV systolic function, mild to moderate RV dilation, mild LA dilation  -EKG (11/26/2024) atrial fibrillation, heart rate 93 bpm, LAFB  -Echo (1/28/2025) EF 56-60%, normal RV size and function, mild biatrial enlargement, no significant valve dysfunction, normal RVSP     Lab data:  -BMP (11/26/2024) creatinine 0.2, potassium 4.4, sodium 136  -proBNP (11/26/2024) 551  -proBNP (1/3/2025) creatinine 0.79, potassium 4.1, sodium 140    Patient Active Problem List   Diagnosis   • Shortness of breath   • Nocturnal hypoxemia   • COPD, severe   • Scarring of lung   • Cigarette nicotine dependence without complication   • Lung nodules   • Hypoxemia requiring supplemental oxygen   • Longstanding  yes persistent atrial fibrillation   • Bronchitis   • Centrilobular emphysema   • Cutaneous abscess of buttock   • Perianal abscess   • Acute non-recurrent sinusitis   • Chronic anticoagulation   • Non-recurrent bilateral inguinal hernia without obstruction or gangrene   • Bilateral lower extremity edema       Social History     Tobacco Use   • Smoking status: Former     Current packs/day: 0.00     Average packs/day: 0.3 packs/day for 51.7 years (15.0 ttl pk-yrs)     Types: Cigarettes     Start date: 1976     Quit date: 2024     Years since quittin.2     Passive exposure: Current   • Smokeless tobacco: Never   • Tobacco comments:     No smoking for over 50 days, taking chantix   Vaping Use   • Vaping status: Never Used   Substance Use Topics   • Alcohol use: Never   • Drug use: Never       Family History   Problem Relation Age of Onset   • Heart disease Mother    • Arrhythmia Mother    • Heart disease Father    • Heart disease Sister    • No Known Problems Brother    • No Known Problems Maternal Aunt    • No Known Problems Maternal Uncle    • No Known Problems Paternal Aunt    • No Known Problems Paternal Uncle    • No Known Problems Maternal Grandmother    • No Known Problems Maternal Grandfather    • No Known Problems Paternal Grandmother    • No Known Problems Paternal Grandfather    • Asthma Neg Hx    • Cancer Neg Hx    • Diabetes Neg Hx    • Emphysema Neg Hx    • Heart failure Neg Hx    • Hypertension Neg Hx        The following portions of the patient's history were reviewed and updated as appropriate: allergies, current medications, past family history, past medical history, past social history, past surgical history, and problem list.      Current Outpatient Medications:   •  acetaminophen (Tylenol) 325 MG tablet, Take 3 tablets by mouth Every 8 (Eight) Hours. Take every 8 hours for 3 days then take prn as needed., Disp: , Rfl:   •  albuterol (PROVENTIL) (2.5 MG/3ML) 0.083% nebulizer solution, TAKE  2.5 MG BY NEBULIZATION EVERY 4 (FOUR) HOURS AS NEEDED FOR WHEEZING., Disp: 360 mL, Rfl: 0  •  albuterol sulfate  (90 Base) MCG/ACT inhaler, Inhale 2 puffs Every 4 (Four) Hours. Indications: ACUTE EXACERBATION OF COPD (INACTIVE), Disp: 54 g, Rfl: 3  •  benzonatate (TESSALON) 200 MG capsule, Take 1 capsule by mouth 2 (Two) Times a Day As Needed for Cough. Indications: Cough, Disp: , Rfl:   •  budesonide-formoterol (SYMBICORT) 160-4.5 MCG/ACT inhaler, Inhale 2 puffs 2 (Two) Times a Day. Rinse and spit after using., Disp: 2 inhaler, Rfl: 0  •  bumetanide (BUMEX) 0.5 MG tablet, Take 1 tablet by mouth Daily., Disp: , Rfl:   •  Cyanocobalamin (Vitamin B-12) 5000 MCG sublingual tablet, Take 1 tablet by mouth Daily. Indications: Inadequate Vitamin B12, Disp: , Rfl:   •  diazePAM (VALIUM) 10 MG tablet, Take 1 tablet by mouth Every 6 (Six) Hours As Needed for Anxiety. Indications: Feeling Anxious, Disp: , Rfl:   •  dilTIAZem HCl Coated Beads (CARDIZEM CD PO), Take 360 mg by mouth Daily. Indications: Decreased Blood Pressure, Disp: , Rfl:   •  metoprolol tartrate (LOPRESSOR) 50 MG tablet, Take 1 tablet by mouth 2 (Two) Times a Day., Disp: 60 tablet, Rfl: 11  •  naloxone (NARCAN) 4 MG/0.1ML nasal spray, Call 911. Don't prime. [place] in 1 nostril for overdose. Repeat in 2-3 minutes in other nostril if no or minimal breathing/responsiveness., Disp: 2 each, Rfl: 0  •  O2 (OXYGEN), Inhale 3 L/min At Night As Needed (shortness of breath). Indications: Respiratory Failure, Disp: , Rfl:   •  ondansetron (Zofran) 4 MG tablet, Take 1 tablet by mouth Every 8 (Eight) Hours As Needed for Nausea or Vomiting., Disp: 15 tablet, Rfl: 0  •  oxyCODONE (Roxicodone) 5 MG immediate release tablet, Take 1 tablet by mouth Every 8 (Eight) Hours As Needed for Severe Pain., Disp: 10 tablet, Rfl: 0  •  POTASSIUM GLUCONATE PO, Take 1 tablet/day by mouth Daily. Indications: Cardiac Failure, Disp: , Rfl:   •  rivaroxaban (XARELTO) 20 MG tablet, Take 1  "tablet by mouth Daily. Indications: Atrial Fibrillation, Disp: , Rfl:   •  sildenafil (VIAGRA) 100 MG tablet, Take 1 tablet by mouth Daily As Needed for Erectile Dysfunction., Disp: , Rfl:   •  tamsulosin (Flomax) 0.4 MG capsule 24 hr capsule, Take 1 capsule by mouth Daily. Indications: Chronic Prostate Gland Inflammation, Disp: , Rfl:   •  tiotropium bromide monohydrate (SPIRIVA RESPIMAT) 2.5 MCG/ACT aerosol solution inhaler, Inhale 2 puffs Daily., Disp: , Rfl:   •  traZODone (DESYREL) 50 MG tablet, Take 2 tablets by mouth Every Night. prn  Indications: Trouble Sleeping, Disp: , Rfl:   •  varenicline (CHANTIX) 0.5 MG tablet, Take 2 tablets by mouth Daily. Indications: Treatment to Stop Smoking, Disp: 180 tablet, Rfl: 3    Review of Systems   Constitutional: Negative for chills and fever.   Cardiovascular:  Negative for chest pain and paroxysmal nocturnal dyspnea.   Respiratory:  Negative for cough and shortness of breath.    Hematologic/Lymphatic: Bruises/bleeds easily.   Skin:  Negative for rash.   Gastrointestinal:  Negative for abdominal pain and heartburn.   Neurological:  Negative for dizziness and numbness.       Objective  /73 (BP Location: Left arm, Patient Position: Sitting, Cuff Size: Adult)   Pulse 83   Ht 177.8 cm (70\")   Wt 72.6 kg (160 lb)   SpO2 98%   BMI 22.96 kg/m²   Constitutional:       Appearance: Well-developed.   HENT:      Head: Normocephalic and atraumatic.   Pulmonary:      Effort: Pulmonary effort is normal.      Breath sounds: Wheezing present.   Cardiovascular:      Normal rate. Irregularly irregular rhythm.   Edema:     Peripheral edema present.     Pretibial: bilateral 2+ edema of the pretibial area.     Ankle: bilateral 2+ edema of the ankle.  Skin:     General: Skin is warm and dry.      Findings: Bruising present.   Neurological:      Mental Status: Alert and oriented to person, place, and time.       Procedures      ICD-10-CM ICD-9-CM   1. Longstanding persistent atrial " fibrillation  I48.11 427.31   2. Chronic anticoagulation  Z79.01 V58.61   3. Bilateral lower extremity edema  R60.0 782.3         Assessment/Plan:  1.  Persistent atrial fibrillation: Heart rate is normal today.  Seen by EP at [facility] and combined ablation and Watchman procedure was recommended.  This is very reasonable.  Continue metoprolol, diltiazem, and Xarelto.       2.  Chronic anticoagulation: With Xarelto.     3.  Lower extremity edema: Patient reports this has improved following hernia surgery and is now mild to moderate and he notes resolves completely when he elevates his legs.  Continue compression stockings, leg elevation, and Bumex.

## 2025-02-27 ENCOUNTER — TRANSCRIBE ORDERS (OUTPATIENT)
Dept: PHYSICAL THERAPY | Facility: HOSPITAL | Age: 65
End: 2025-02-27
Payer: MEDICARE

## 2025-02-27 DIAGNOSIS — M15.0 PRIMARY GENERALIZED (OSTEO)ARTHRITIS: Primary | ICD-10-CM

## (undated) DEVICE — ADHS SKIN PREMIERPRO EXOFIN TOPICAL HI/VISC .5ML

## (undated) DEVICE — CATH IV JELCO 18GA 10 1 3/4 IN

## (undated) DEVICE — SUT MNCRYL 4/0 PS2 27IN UD MCP426H

## (undated) DEVICE — SYR LL TP 10ML STRL

## (undated) DEVICE — 4-PORT MANIFOLD: Brand: NEPTUNE 2

## (undated) DEVICE — KT CLN CLEANOR SCPE

## (undated) DEVICE — PATIENT RETURN ELECTRODE, SINGLE-USE, CONTACT QUALITY MONITORING, ADULT, WITH 9FT CORD, FOR PATIENTS WEIGING OVER 33LBS. (15KG): Brand: MEGADYNE

## (undated) DEVICE — TBG INSUFFLATION LUER LOCK: Brand: MEDLINE INDUSTRIES, INC.

## (undated) DEVICE — DAVINCI: Brand: MEDLINE INDUSTRIES, INC.

## (undated) DEVICE — ANTIBACTERIAL UNDYED BRAIDED (POLYGLACTIN 910), SYNTHETIC ABSORBABLE SUTURE: Brand: COATED VICRYL

## (undated) DEVICE — GLV SURG SENSICARE W/ALOE PF LF 6.5 STRL

## (undated) DEVICE — ELECTRD BLD EZ CLN MOD XLNG 2.75IN

## (undated) DEVICE — SEAL

## (undated) DEVICE — PK POSTN TRENGUARD450 PROC

## (undated) DEVICE — GLV SURG DERMASSURE GRN LF PF 7.0

## (undated) DEVICE — NEEDLE,22GX1.5",REG,BEVEL: Brand: MEDLINE

## (undated) DEVICE — ARM DRAPE

## (undated) DEVICE — GLOVE,SURG,SENSICARE,ALOE,LF,PF,6: Brand: MEDLINE

## (undated) DEVICE — SYR LUERLOK 30CC

## (undated) DEVICE — BLADELESS OBTURATOR: Brand: WECK VISTA